# Patient Record
Sex: FEMALE | Race: WHITE | Employment: FULL TIME
[De-identification: names, ages, dates, MRNs, and addresses within clinical notes are randomized per-mention and may not be internally consistent; named-entity substitution may affect disease eponyms.]

---

## 2017-02-01 ENCOUNTER — CHARTING TRANS (OUTPATIENT)
Dept: OTHER | Age: 57
End: 2017-02-01

## 2017-02-01 ENCOUNTER — LAB SERVICES (OUTPATIENT)
Dept: OTHER | Age: 57
End: 2017-02-01

## 2017-02-01 LAB — RAPID STREP GROUP A: POSITIVE

## 2017-02-01 ASSESSMENT — PAIN SCALES - GENERAL: PAINLEVEL_OUTOF10: 5

## 2017-03-20 ENCOUNTER — TELEPHONE (OUTPATIENT)
Dept: FAMILY MEDICINE CLINIC | Facility: CLINIC | Age: 57
End: 2017-03-20

## 2017-03-20 DIAGNOSIS — Z12.31 ENCOUNTER FOR SCREENING MAMMOGRAM FOR BREAST CANCER: Primary | ICD-10-CM

## 2017-03-20 NOTE — TELEPHONE ENCOUNTER
Kunal Marc, please see order for mammogram. Please authorize if correct. Please review note below from [facility]. Thank you Celestina.

## 2017-03-20 NOTE — TELEPHONE ENCOUNTER
Pt would like order for a mammogram. Please advise. Thank you. Pt also wanted Juan Joséyn Postal to know she is seeing Dr. Alexandra Moser (cardiology) tomorrow 3/21/17.

## 2017-03-21 ENCOUNTER — PRIOR ORIGINAL RECORDS (OUTPATIENT)
Dept: OTHER | Age: 57
End: 2017-03-21

## 2017-03-21 ENCOUNTER — HOSPITAL ENCOUNTER (OUTPATIENT)
Dept: MAMMOGRAPHY | Age: 57
Discharge: HOME | End: 2017-03-21
Attending: FAMILY MEDICINE
Payer: COMMERCIAL

## 2017-03-21 DIAGNOSIS — Z12.31 ENCOUNTER FOR SCREENING MAMMOGRAM FOR BREAST CANCER: ICD-10-CM

## 2017-03-21 PROCEDURE — 77067 SCR MAMMO BI INCL CAD: CPT

## 2017-03-22 ENCOUNTER — HOSPITAL ENCOUNTER (OUTPATIENT)
Dept: CV DIAGNOSTICS | Facility: HOSPITAL | Age: 57
Discharge: HOME | End: 2017-03-22
Attending: INTERNAL MEDICINE

## 2017-03-22 ENCOUNTER — MYAURORA ACCOUNT LINK (OUTPATIENT)
Dept: OTHER | Age: 57
End: 2017-03-22

## 2017-03-22 DIAGNOSIS — I10 ESSENTIAL HYPERTENSION, BENIGN: ICD-10-CM

## 2017-03-22 DIAGNOSIS — R06.00 DYSPNEA, UNSPECIFIED TYPE: ICD-10-CM

## 2017-03-24 ENCOUNTER — PRIOR ORIGINAL RECORDS (OUTPATIENT)
Dept: OTHER | Age: 57
End: 2017-03-24

## 2017-03-27 PROBLEM — Z86.010 HISTORY OF ADENOMATOUS POLYP OF COLON: Status: ACTIVE | Noted: 2017-03-27

## 2017-03-27 PROBLEM — Z86.0101 HISTORY OF ADENOMATOUS POLYP OF COLON: Status: ACTIVE | Noted: 2017-03-27

## 2017-03-30 ENCOUNTER — ANESTHESIA EVENT (OUTPATIENT)
Dept: ENDOSCOPY | Facility: HOSPITAL | Age: 57
End: 2017-03-30

## 2017-03-30 ENCOUNTER — SURGERY (OUTPATIENT)
Age: 57
End: 2017-03-30

## 2017-03-30 ENCOUNTER — HOSPITAL ENCOUNTER (OUTPATIENT)
Facility: HOSPITAL | Age: 57
Setting detail: HOSPITAL OUTPATIENT SURGERY
Discharge: HOME | End: 2017-03-30
Attending: INTERNAL MEDICINE | Admitting: INTERNAL MEDICINE
Payer: COMMERCIAL

## 2017-03-30 ENCOUNTER — ANESTHESIA (OUTPATIENT)
Dept: ENDOSCOPY | Facility: HOSPITAL | Age: 57
End: 2017-03-30

## 2017-03-30 VITALS
WEIGHT: 164 LBS | SYSTOLIC BLOOD PRESSURE: 106 MMHG | DIASTOLIC BLOOD PRESSURE: 57 MMHG | BODY MASS INDEX: 30.18 KG/M2 | HEIGHT: 62 IN | HEART RATE: 65 BPM | OXYGEN SATURATION: 97 % | RESPIRATION RATE: 21 BRPM

## 2017-03-30 DIAGNOSIS — K57.90 DIVERTICULOSIS: Primary | ICD-10-CM

## 2017-03-30 PROCEDURE — 36415 COLL VENOUS BLD VENIPUNCTURE: CPT | Performed by: INTERNAL MEDICINE

## 2017-03-30 PROCEDURE — 0DJD8ZZ INSPECTION OF LOWER INTESTINAL TRACT, VIA NATURAL OR ARTIFICIAL OPENING ENDOSCOPIC: ICD-10-PCS | Performed by: INTERNAL MEDICINE

## 2017-03-30 RX ORDER — MIDAZOLAM HYDROCHLORIDE 1 MG/ML
INJECTION INTRAMUSCULAR; INTRAVENOUS AS NEEDED
Status: DISCONTINUED | OUTPATIENT
Start: 2017-03-30 | End: 2017-03-30 | Stop reason: SURG

## 2017-03-30 RX ORDER — SODIUM CHLORIDE, SODIUM LACTATE, POTASSIUM CHLORIDE, CALCIUM CHLORIDE 600; 310; 30; 20 MG/100ML; MG/100ML; MG/100ML; MG/100ML
INJECTION, SOLUTION INTRAVENOUS CONTINUOUS
Status: DISCONTINUED | OUTPATIENT
Start: 2017-03-30 | End: 2017-03-30

## 2017-03-30 RX ORDER — SODIUM CHLORIDE, SODIUM LACTATE, POTASSIUM CHLORIDE, CALCIUM CHLORIDE 600; 310; 30; 20 MG/100ML; MG/100ML; MG/100ML; MG/100ML
INJECTION, SOLUTION INTRAVENOUS CONTINUOUS PRN
Status: DISCONTINUED | OUTPATIENT
Start: 2017-03-30 | End: 2017-03-30 | Stop reason: SURG

## 2017-03-30 RX ORDER — NALOXONE HYDROCHLORIDE 0.4 MG/ML
80 INJECTION, SOLUTION INTRAMUSCULAR; INTRAVENOUS; SUBCUTANEOUS AS NEEDED
Status: DISCONTINUED | OUTPATIENT
Start: 2017-03-30 | End: 2017-03-30

## 2017-03-30 RX ORDER — LIDOCAINE HYDROCHLORIDE 10 MG/ML
INJECTION, SOLUTION EPIDURAL; INFILTRATION; INTRACAUDAL; PERINEURAL AS NEEDED
Status: DISCONTINUED | OUTPATIENT
Start: 2017-03-30 | End: 2017-03-30 | Stop reason: SURG

## 2017-03-30 RX ADMIN — SODIUM CHLORIDE, SODIUM LACTATE, POTASSIUM CHLORIDE, CALCIUM CHLORIDE: 600; 310; 30; 20 INJECTION, SOLUTION INTRAVENOUS at 14:50:00

## 2017-03-30 RX ADMIN — MIDAZOLAM HYDROCHLORIDE 2 MG: 1 INJECTION INTRAMUSCULAR; INTRAVENOUS at 14:38:00

## 2017-03-30 RX ADMIN — SODIUM CHLORIDE, SODIUM LACTATE, POTASSIUM CHLORIDE, CALCIUM CHLORIDE: 600; 310; 30; 20 INJECTION, SOLUTION INTRAVENOUS at 14:34:00

## 2017-03-30 RX ADMIN — LIDOCAINE HYDROCHLORIDE 50 MG: 10 INJECTION, SOLUTION EPIDURAL; INFILTRATION; INTRACAUDAL; PERINEURAL at 14:37:00

## 2017-03-30 NOTE — ANESTHESIA POSTPROCEDURE EVALUATION
Patient: Tiffany Zimmerman    Procedure Summary     Date Anesthesia Start Anesthesia Stop Room / Location    03/30/17 1434 1456 [address] [facility] ENDOSCOPY 05 / [address] [facility] ENDOSCOPY       Procedure Diagnosis Surgeon Responsible Provider    COLONOSCOPY (N/A ) Hx of colonic polyps

## 2017-03-30 NOTE — ANESTHESIA PREPROCEDURE EVALUATION
Anesthesia PreOp Note    HPI:     Mendoza Guardado is a 61year old female who presents for preoperative consultation requested by: Divine Crowell MD    Date of Surgery: 3/30/2017    Procedure(s):  COLONOSCOPY  Indication: Hx of colonic polyps    Pre-Op 3350-KCl-NaBcb-NaCl-NaSulf 236 g Oral Recon Soln Take as directed Disp: 4000 mL Rfl: 0    FLUoxetine HCl (PROZAC) 40 MG Oral Cap Take 1 capsule (40 mg total) by mouth daily.  Disp: 30 capsule Rfl: 0 3/30/2017 at 0800   ezetimibe (ZETIA) 10 MG Oral Tab Take 29.99 kg/(m^2). height is 1.575 m (5' 2\") and weight is 74.39 kg (164 lb). Her blood pressure is 117/56 and her pulse is 78.  Her respiration is 35 and oxygen saturation is 96%.    03/28/17  1311 03/30/17  1316   BP:  117/56   Pulse:  78   Resp:  35   He

## 2017-03-30 NOTE — OPERATIVE REPORT
COLONOSCOPY REPORT    Patient Name:  Tyrone Roa Record #: [id number]  YOB: 1960  Date of Procedure: 3/30/2017    Referring physician: Mikie Burnette, DO     Colonoscopy to cecum    Surgeon:  Varghese Ornelas MD    Pre-op diagno

## 2017-03-30 NOTE — H&P
RE-PROCEDURE UPDATE    HPI: Carol Alatorre is a 61year old female. 1/2/1960. Patient presents for a colonoscopy. ALLERGIES:   Clindamycin             Diarrhea    Comment:Rectal bleeding      No current outpatient prescriptions on file.   Past Medical

## 2017-03-31 ENCOUNTER — OCC HEALTH (OUTPATIENT)
Dept: OCCUPATIONAL MEDICINE | Age: 57
End: 2017-03-31
Attending: PHYSICIAN ASSISTANT

## 2017-03-31 ENCOUNTER — PRIOR ORIGINAL RECORDS (OUTPATIENT)
Dept: OTHER | Age: 57
End: 2017-03-31

## 2017-06-13 ENCOUNTER — PRIOR ORIGINAL RECORDS (OUTPATIENT)
Dept: OTHER | Age: 57
End: 2017-06-13

## 2017-06-27 DIAGNOSIS — E78.00 HYPERCHOLESTEREMIA: ICD-10-CM

## 2017-06-30 RX ORDER — EZETIMIBE 10 MG/1
10 TABLET ORAL
Qty: 90 TABLET | Refills: 0 | Status: SHIPPED
Start: 2017-06-30 | End: 2018-02-13 | Stop reason: ALTCHOICE

## 2017-06-30 RX ORDER — HYDROCHLOROTHIAZIDE 12.5 MG/1
12.5 CAPSULE, GELATIN COATED ORAL DAILY
Qty: 90 CAPSULE | Refills: 0 | Status: SHIPPED
Start: 2017-06-30 | End: 2017-09-27

## 2017-06-30 RX ORDER — ROSUVASTATIN CALCIUM 40 MG/1
40 TABLET, COATED ORAL
Qty: 90 TABLET | Refills: 0 | Status: SHIPPED
Start: 2017-06-30 | End: 2017-09-27

## 2017-06-30 RX ORDER — PROPRANOLOL HYDROCHLORIDE 10 MG/1
TABLET ORAL
Qty: 90 TABLET | Refills: 0 | Status: SHIPPED
Start: 2017-06-30 | End: 2017-09-27

## 2017-06-30 RX ORDER — RAMIPRIL 10 MG/1
10 CAPSULE ORAL DAILY
Qty: 90 CAPSULE | Refills: 0 | Status: SHIPPED
Start: 2017-06-30 | End: 2017-09-27

## 2017-06-30 RX ORDER — FLUOXETINE HYDROCHLORIDE 40 MG/1
40 CAPSULE ORAL DAILY
Qty: 90 CAPSULE | Refills: 0 | Status: SHIPPED
Start: 2017-06-30 | End: 2017-07-13

## 2017-06-30 NOTE — TELEPHONE ENCOUNTER
Pt calling requesting update on refill request. She has been out of meds for two days now. Please advise. Thank you.

## 2017-07-05 ENCOUNTER — TELEPHONE (OUTPATIENT)
Dept: FAMILY MEDICINE CLINIC | Facility: CLINIC | Age: 57
End: 2017-07-05

## 2017-07-13 ENCOUNTER — OFFICE VISIT (OUTPATIENT)
Dept: FAMILY MEDICINE CLINIC | Facility: CLINIC | Age: 57
End: 2017-07-13

## 2017-07-13 VITALS
DIASTOLIC BLOOD PRESSURE: 88 MMHG | BODY MASS INDEX: 32.94 KG/M2 | TEMPERATURE: 97 F | HEART RATE: 80 BPM | SYSTOLIC BLOOD PRESSURE: 138 MMHG | RESPIRATION RATE: 12 BRPM | WEIGHT: 179 LBS | HEIGHT: 62 IN

## 2017-07-13 DIAGNOSIS — F41.9 ANXIETY: ICD-10-CM

## 2017-07-13 DIAGNOSIS — I10 ESSENTIAL HYPERTENSION: Primary | ICD-10-CM

## 2017-07-13 DIAGNOSIS — E78.00 HYPERCHOLESTEREMIA: ICD-10-CM

## 2017-07-13 PROCEDURE — 99213 OFFICE O/P EST LOW 20 MIN: CPT | Performed by: FAMILY MEDICINE

## 2017-07-13 RX ORDER — FLUOXETINE HYDROCHLORIDE 20 MG/1
20 CAPSULE ORAL DAILY
Qty: 30 CAPSULE | Refills: 0 | COMMUNITY
Start: 2017-07-13 | End: 2018-02-12

## 2017-07-13 NOTE — PROGRESS NOTES
CHIEF COMPLAINT:   Lacy Sessions a 61year old female is here for followup on HTN and anxiety  HPI:   Lacy Sessions has been doing well since the last visit here. Lacy Sessions  is compliant with hypertensive medication. No chest pain. No dyspnea.  No update. If doing well on lower dose, will decrease further to 10mg daily x 1month - if continues to do well then would stop and give alprazolam or similar for just rare prn anxiety.

## 2017-08-21 ENCOUNTER — TELEPHONE (OUTPATIENT)
Dept: SURGERY | Facility: CLINIC | Age: 57
End: 2017-08-21

## 2017-09-23 ENCOUNTER — PRIOR ORIGINAL RECORDS (OUTPATIENT)
Dept: OTHER | Age: 57
End: 2017-09-23

## 2017-09-24 LAB
ALBUMIN/GLOBULIN RATIO: 1.3 (CALC) (ref 1–2.5)
ALBUMIN: 4.3 G/DL (ref 3.6–5.1)
ALKALINE PHOSPHATASE: 73 U/L (ref 33–130)
ALT: 15 U/L (ref 6–29)
AST: 21 U/L (ref 10–35)
BILIRUBIN, TOTAL: 0.3 MG/DL (ref 0.2–1.2)
BUN: 18 MG/DL (ref 7–25)
CALCIUM: 9.3 MG/DL (ref 8.6–10.4)
CARBON DIOXIDE: 24 MMOL/L (ref 20–31)
CHLORIDE: 105 MMOL/L (ref 98–110)
CHOL/HDLC RATIO: 9.1 (CALC)
CHOLESTEROL, TOTAL: 338 MG/DL
CREATININE: 0.76 MG/DL (ref 0.5–1.05)
EGFR IF AFRICN AM: 100 ML/MIN/1.73M2
EGFR IF NONAFRICN AM: 86 ML/MIN/1.73M2
GLOBULIN: 3.2 G/DL (CALC) (ref 1.9–3.7)
GLUCOSE: 97 MG/DL (ref 65–99)
HDL CHOLESTEROL: 37 MG/DL
LDL-CHOLESTEROL: 257 MG/DL (CALC)
NON-HDL CHOLESTEROL: 301 MG/DL (CALC)
POTASSIUM: 4.1 MMOL/L (ref 3.5–5.3)
PROTEIN, TOTAL: 7.5 G/DL (ref 6.1–8.1)
SODIUM: 140 MMOL/L (ref 135–146)
TRIGLYCERIDES: 236 MG/DL

## 2017-09-27 DIAGNOSIS — E78.00 HYPERCHOLESTEREMIA: ICD-10-CM

## 2017-09-28 RX ORDER — RAMIPRIL 10 MG/1
CAPSULE ORAL
Qty: 90 CAPSULE | Refills: 0 | Status: SHIPPED | OUTPATIENT
Start: 2017-09-28 | End: 2018-01-08

## 2017-09-28 RX ORDER — ROSUVASTATIN CALCIUM 40 MG/1
TABLET, COATED ORAL
Qty: 90 TABLET | Refills: 0 | Status: SHIPPED | OUTPATIENT
Start: 2017-09-28 | End: 2018-01-08

## 2017-09-28 RX ORDER — HYDROCHLOROTHIAZIDE 12.5 MG/1
CAPSULE, GELATIN COATED ORAL
Qty: 90 CAPSULE | Refills: 0 | Status: SHIPPED | OUTPATIENT
Start: 2017-09-28 | End: 2018-01-08

## 2017-09-28 RX ORDER — FLUOXETINE 10 MG/1
CAPSULE ORAL
Qty: 15 CAPSULE | Refills: 0 | Status: SHIPPED | OUTPATIENT
Start: 2017-09-28 | End: 2018-02-12

## 2017-09-28 RX ORDER — PROPRANOLOL HYDROCHLORIDE 10 MG/1
TABLET ORAL
Qty: 90 TABLET | Refills: 0 | Status: ON HOLD | OUTPATIENT
Start: 2017-09-28 | End: 2018-03-26

## 2017-09-28 NOTE — TELEPHONE ENCOUNTER
Spoke with pt and she stated that she just called her pharmacy to refill all her meds and forgot that this is changed. Per pt, she has been taking 20 mg every other day and doing well.       Spoke with Mariaelena Baker and she advised to start 10 mg by m

## 2017-09-29 LAB
ALBUMIN: 4.3 G/DL
ALKALINE PHOSPHATATE(ALK PHOS): 73 IU/L
BILIRUBIN TOTAL: 0.3 MG/DL
BUN: 18 MG/DL
CALCIUM: 9.3 MG/DL
CHLORIDE: 105 MEQ/L
CHOLESTEROL, TOTAL: 338 MG/DL
CREATININE, SERUM: 0.76 MG/DL
GLOBULIN: 3.2 G/DL
GLUCOSE: 97 MG/DL
HDL CHOLESTEROL: 37 MG/DL
LDL CHOLESTEROL: 257 MG/DL
POTASSIUM, SERUM: 4.1 MEQ/L
PROTEIN, TOTAL: 7.5 G/DL
SGOT (AST): 21 IU/L
SGPT (ALT): 15 IU/L
SODIUM: 140 MEQ/L
TRIGLYCERIDES: 236 MG/DL

## 2017-10-17 ENCOUNTER — PRIOR ORIGINAL RECORDS (OUTPATIENT)
Dept: OTHER | Age: 57
End: 2017-10-17

## 2017-11-02 ENCOUNTER — MED REC SCAN ONLY (OUTPATIENT)
Dept: FAMILY MEDICINE CLINIC | Facility: CLINIC | Age: 57
End: 2017-11-02

## 2017-11-13 ENCOUNTER — TELEPHONE (OUTPATIENT)
Dept: FAMILY MEDICINE CLINIC | Facility: CLINIC | Age: 57
End: 2017-11-13

## 2017-11-13 NOTE — TELEPHONE ENCOUNTER
Pt is out of FLUoxetine HCl 10 MG Oral Cap and would like to know if Airam Mota advises stopping the medication. Pt has been decreasing her dose of this medication.

## 2017-11-14 NOTE — TELEPHONE ENCOUNTER
Call to pt-confirms she has not had any symptoms return while weaning off fluoxetine. Advised of blanca MUKHERJEE comments/recommendeations. Advised to continue to monitor and if any symptoms return, notify blanca.   Patient voices understanding/agrees with

## 2017-11-14 NOTE — TELEPHONE ENCOUNTER
If she has had no return of symptoms as she has decreased the dose of the medication, she could now discontinue.

## 2017-11-22 ENCOUNTER — PRIOR ORIGINAL RECORDS (OUTPATIENT)
Dept: OTHER | Age: 57
End: 2017-11-22

## 2017-12-12 ENCOUNTER — PRIOR ORIGINAL RECORDS (OUTPATIENT)
Dept: OTHER | Age: 57
End: 2017-12-12

## 2018-01-02 ENCOUNTER — PRIOR ORIGINAL RECORDS (OUTPATIENT)
Dept: OTHER | Age: 58
End: 2018-01-02

## 2018-01-05 ENCOUNTER — HOSPITAL ENCOUNTER (OUTPATIENT)
Dept: CV DIAGNOSTICS | Facility: HOSPITAL | Age: 58
Discharge: HOME | End: 2018-01-05
Attending: INTERNAL MEDICINE
Payer: COMMERCIAL

## 2018-01-05 DIAGNOSIS — R06.00 DYSPNEA, UNSPECIFIED TYPE: ICD-10-CM

## 2018-01-05 PROCEDURE — 93350 STRESS TTE ONLY: CPT | Performed by: INTERNAL MEDICINE

## 2018-01-05 PROCEDURE — 93018 CV STRESS TEST I&R ONLY: CPT | Performed by: INTERNAL MEDICINE

## 2018-01-05 PROCEDURE — 93017 CV STRESS TEST TRACING ONLY: CPT | Performed by: INTERNAL MEDICINE

## 2018-01-08 ENCOUNTER — TELEPHONE (OUTPATIENT)
Dept: FAMILY MEDICINE CLINIC | Facility: CLINIC | Age: 58
End: 2018-01-08

## 2018-01-08 ENCOUNTER — PRIOR ORIGINAL RECORDS (OUTPATIENT)
Dept: OTHER | Age: 58
End: 2018-01-08

## 2018-01-08 DIAGNOSIS — Z12.39 SCREENING FOR BREAST CANCER: ICD-10-CM

## 2018-01-08 DIAGNOSIS — N60.19 FIBROCYSTIC BREAST DISEASE (FCBD), UNSPECIFIED LATERALITY: Primary | ICD-10-CM

## 2018-01-08 DIAGNOSIS — E78.00 HYPERCHOLESTEREMIA: ICD-10-CM

## 2018-01-08 NOTE — TELEPHONE ENCOUNTER
Pt scheduled a physical w/Alana 2/2/18. Pt requesting a mammogram order be placed through [address], as pt has a hx of calcifications and surgery has been recommended in the past as well as keeping an eye on these per pt.     Please contact pt when mammogr

## 2018-01-08 NOTE — TELEPHONE ENCOUNTER
Please call pt for follow up HTN with Zoraida Pena for next month. Then back to Nurses for refills. Thanks.

## 2018-01-09 ENCOUNTER — TELEPHONE (OUTPATIENT)
Dept: FAMILY MEDICINE CLINIC | Facility: CLINIC | Age: 58
End: 2018-01-09

## 2018-01-09 DIAGNOSIS — R93.89 ABNORMAL X-RAY OF NECK: Primary | ICD-10-CM

## 2018-01-09 NOTE — TELEPHONE ENCOUNTER
Call to pt-advised of blanca MUKHERJEE comments/recommendations noted below. Pt sts was advised to have follow up mamm every 6 months \"due to hx of breast calcifications. \" reviewed 3/21/17 mammogram findings/recommendations with pt.    Discussed recommendat

## 2018-01-09 NOTE — TELEPHONE ENCOUNTER
It appears that she has been doing a mammogram every year - I don't see any indication of recommendation for mammogram every 6 months and it appears that she does once yearly - at least since we have ordered (unless additional views were recommended by rad

## 2018-01-09 NOTE — TELEPHONE ENCOUNTER
information and recommendations given to patient, understanding verbalized.   States she was aware of the information, states she will have the dentist fax the information to our office, also unable to make scheduled appointment due to other engagements yogesh

## 2018-01-10 RX ORDER — HYDROCHLOROTHIAZIDE 12.5 MG/1
CAPSULE, GELATIN COATED ORAL
Qty: 90 CAPSULE | Refills: 0 | Status: ON HOLD | OUTPATIENT
Start: 2018-01-10 | End: 2018-03-26

## 2018-01-10 RX ORDER — ROSUVASTATIN CALCIUM 40 MG/1
TABLET, COATED ORAL
Qty: 90 TABLET | Refills: 0 | Status: ON HOLD | OUTPATIENT
Start: 2018-01-10 | End: 2018-03-26

## 2018-01-10 RX ORDER — RAMIPRIL 10 MG/1
CAPSULE ORAL
Qty: 90 CAPSULE | Refills: 0 | Status: ON HOLD | OUTPATIENT
Start: 2018-01-10 | End: 2018-03-26

## 2018-01-11 NOTE — TELEPHONE ENCOUNTER
Difficult to determine for certain with the orientation of the xray but there appears to be a calcification in one of the arteries in the right side of the neck. Check B/L carotid doppler.

## 2018-01-11 NOTE — TELEPHONE ENCOUNTER
June, please advise dx on this, I am not sure which artery you are referring to from the xray as I am not sure what this was xray of that pt brought you. Then I can pend u/s and call pt. Thanks.

## 2018-01-12 NOTE — TELEPHONE ENCOUNTER
I couldn't tell which artery since it's a plain xray from her dentist.  I just used abnormal xray as dx.   I have ordered test.

## 2018-01-12 NOTE — TELEPHONE ENCOUNTER
Call to pt-advised of info noted below from blanca MUKHERJEE. Reviewed order info, dx and advised can schedule thru [facility] central scheduling. Patient voices understanding/agrees with plan/no further questions.  sts has central sched #, also has an order from

## 2018-01-27 ENCOUNTER — HOSPITAL ENCOUNTER (OUTPATIENT)
Dept: ULTRASOUND IMAGING | Facility: HOSPITAL | Age: 58
Discharge: HOME | End: 2018-01-27
Attending: FAMILY MEDICINE
Payer: COMMERCIAL

## 2018-01-27 ENCOUNTER — PRIOR ORIGINAL RECORDS (OUTPATIENT)
Dept: OTHER | Age: 58
End: 2018-01-27

## 2018-01-27 ENCOUNTER — HOSPITAL ENCOUNTER (OUTPATIENT)
Dept: CT IMAGING | Facility: HOSPITAL | Age: 58
Discharge: HOME | End: 2018-01-27
Attending: INTERNAL MEDICINE

## 2018-01-27 DIAGNOSIS — Z13.6 SCREENING FOR HEART DISEASE: ICD-10-CM

## 2018-01-27 DIAGNOSIS — R93.89 ABNORMAL X-RAY OF NECK: ICD-10-CM

## 2018-01-27 PROCEDURE — 93880 EXTRACRANIAL BILAT STUDY: CPT | Performed by: FAMILY MEDICINE

## 2018-02-01 ENCOUNTER — TELEPHONE (OUTPATIENT)
Dept: FAMILY MEDICINE CLINIC | Facility: CLINIC | Age: 58
End: 2018-02-01

## 2018-02-01 DIAGNOSIS — R93.1 ABNORMAL HEART SCORE CT: Primary | ICD-10-CM

## 2018-02-05 ENCOUNTER — PRIOR ORIGINAL RECORDS (OUTPATIENT)
Dept: OTHER | Age: 58
End: 2018-02-05

## 2018-02-05 LAB — UFCT: 1044.75 CA SCORE

## 2018-02-06 ENCOUNTER — PRIOR ORIGINAL RECORDS (OUTPATIENT)
Dept: OTHER | Age: 58
End: 2018-02-06

## 2018-02-07 ENCOUNTER — PRIOR ORIGINAL RECORDS (OUTPATIENT)
Dept: OTHER | Age: 58
End: 2018-02-07

## 2018-02-12 RX ORDER — BIOTIN 5 MG
1 TABLET ORAL DAILY
COMMUNITY
End: 2018-06-26

## 2018-02-12 RX ORDER — ASPIRIN 81 MG/1
81 TABLET, CHEWABLE ORAL DAILY
COMMUNITY
End: 2018-03-12

## 2018-02-12 NOTE — HISTORICAL OFFICE NOTE
Cecytameka Lowery  : 1960 12:27:32  ACCOUNT:  [id number]  HOME PHONE:  [phone]  WORK PHONE:       Call placed to patient. Informed that Dr. Wallace Call reviewed carotid u/s and CT with calcium score.    Regarding carotid u/s: L 40% stenosis, R 25

## 2018-02-12 NOTE — HISTORICAL OFFICE NOTE
Charly Beckypaige  : 1960  ACCOUNT:  [id number]  [phone]  PCP: Dr. Carina Benson     TODAY'S DATE: 10/17/2017  DICTATED BY:  Rama Rodriguez MD]    CHIEF COMPLAINT: [Followup of Hypercholesterolemia, familial.]    HPI: [On 10/17/2017, Tabitha Srivastava and HR at [address]: No Known Allergies    MEDICATIONS: Selected prescriptions see below    VITAL SIGNS: [B/P - 156/90, Pulse - 66, Weight - 178, Height - 62, BMI - 32.6 ]    CONS: well developed, well nourished and overweight.  WEIGHT: [facility]

## 2018-02-13 ENCOUNTER — PRIOR ORIGINAL RECORDS (OUTPATIENT)
Dept: OTHER | Age: 58
End: 2018-02-13

## 2018-02-13 ENCOUNTER — LABORATORY ENCOUNTER (OUTPATIENT)
Dept: LAB | Age: 58
End: 2018-02-13
Attending: INTERNAL MEDICINE
Payer: COMMERCIAL

## 2018-02-13 ENCOUNTER — OFFICE VISIT (OUTPATIENT)
Dept: FAMILY MEDICINE CLINIC | Facility: CLINIC | Age: 58
End: 2018-02-13

## 2018-02-13 VITALS
HEART RATE: 72 BPM | BODY MASS INDEX: 32.02 KG/M2 | WEIGHT: 174 LBS | SYSTOLIC BLOOD PRESSURE: 132 MMHG | DIASTOLIC BLOOD PRESSURE: 76 MMHG | RESPIRATION RATE: 12 BRPM | HEIGHT: 62 IN | TEMPERATURE: 98 F

## 2018-02-13 DIAGNOSIS — E78.00 HYPERCHOLESTEREMIA: ICD-10-CM

## 2018-02-13 DIAGNOSIS — R93.89 ABNORMAL X-RAY OF NECK: ICD-10-CM

## 2018-02-13 DIAGNOSIS — Z00.00 ROUTINE GENERAL MEDICAL EXAMINATION AT A HEALTH CARE FACILITY: Primary | ICD-10-CM

## 2018-02-13 DIAGNOSIS — Z00.00 BLOOD TESTS FOR ROUTINE GENERAL PHYSICAL EXAMINATION: ICD-10-CM

## 2018-02-13 DIAGNOSIS — I25.10 CAD (CORONARY ARTERY DISEASE): ICD-10-CM

## 2018-02-13 LAB
BASOPHILS # BLD AUTO: 0.11 X10(3) UL (ref 0–0.1)
BASOPHILS NFR BLD AUTO: 1.7 %
BUN BLD-MCNC: 18 MG/DL (ref 8–20)
CALCIUM BLD-MCNC: 9.5 MG/DL (ref 8.3–10.3)
CHLORIDE: 101 MMOL/L (ref 101–111)
CO2: 28 MMOL/L (ref 22–32)
CREAT BLD-MCNC: 0.9 MG/DL (ref 0.55–1.02)
EOSINOPHIL # BLD AUTO: 0.24 X10(3) UL (ref 0–0.3)
EOSINOPHIL NFR BLD AUTO: 3.8 %
ERYTHROCYTE [DISTWIDTH] IN BLOOD BY AUTOMATED COUNT: 13.4 % (ref 11.5–16)
FREE T4: 0.9 NG/DL (ref 0.9–1.8)
GLUCOSE BLD-MCNC: 101 MG/DL (ref 70–99)
HCT VFR BLD AUTO: 41.9 % (ref 34–50)
HGB BLD-MCNC: 13.6 G/DL (ref 12–16)
IMMATURE GRANULOCYTE COUNT: 0.01 X10(3) UL (ref 0–1)
IMMATURE GRANULOCYTE RATIO %: 0.2 %
LYMPHOCYTES # BLD AUTO: 2.06 X10(3) UL (ref 0.9–4)
LYMPHOCYTES NFR BLD AUTO: 32.4 %
MCH RBC QN AUTO: 30.8 PG (ref 27–33.2)
MCHC RBC AUTO-ENTMCNC: 32.5 G/DL (ref 31–37)
MCV RBC AUTO: 95 FL (ref 81–100)
MONOCYTES # BLD AUTO: 0.59 X10(3) UL (ref 0.1–1)
MONOCYTES NFR BLD AUTO: 9.3 %
NEUTROPHIL ABS PRELIM: 3.34 X10 (3) UL (ref 1.3–6.7)
NEUTROPHILS # BLD AUTO: 3.34 X10(3) UL (ref 1.3–6.7)
NEUTROPHILS NFR BLD AUTO: 52.6 %
PLATELET # BLD AUTO: 329 10(3)UL (ref 150–450)
POTASSIUM SERPL-SCNC: 3.9 MMOL/L (ref 3.6–5.1)
RBC # BLD AUTO: 4.41 X10(6)UL (ref 3.8–5.1)
RED CELL DISTRIBUTION WIDTH-SD: 47.1 FL (ref 35.1–46.3)
SODIUM SERPL-SCNC: 138 MMOL/L (ref 136–144)
TSI SER-ACNC: 7.05 MIU/ML (ref 0.35–5.5)
WBC # BLD AUTO: 6.4 X10(3) UL (ref 4–13)

## 2018-02-13 PROCEDURE — 36415 COLL VENOUS BLD VENIPUNCTURE: CPT

## 2018-02-13 PROCEDURE — 84443 ASSAY THYROID STIM HORMONE: CPT | Performed by: FAMILY MEDICINE

## 2018-02-13 PROCEDURE — 84439 ASSAY OF FREE THYROXINE: CPT | Performed by: FAMILY MEDICINE

## 2018-02-13 PROCEDURE — 85025 COMPLETE CBC W/AUTO DIFF WBC: CPT

## 2018-02-13 PROCEDURE — 80048 BASIC METABOLIC PNL TOTAL CA: CPT

## 2018-02-13 PROCEDURE — 99396 PREV VISIT EST AGE 40-64: CPT | Performed by: FAMILY MEDICINE

## 2018-02-13 NOTE — PROGRESS NOTES
CHIEF COMPLAINT   Complete physical  HPI:   Corby Ortiz is a 60year old female who presents for a complete physical exam.   Dr. Nicolas Rawls for pap/breast/pelvic. Angiogram pending next week.   Working with Dr. Jaya Oswald to try to get zetia or Arti Irwin MOUTH DAILY Disp: 90 capsule Rfl: 0   RAMIPRIL 10 MG Oral Cap TAKE 1 CAPSULE(10 MG) BY MOUTH DAILY Disp: 90 capsule Rfl: 0   PROPRANOLOL HCL 10 MG Oral Tab TAKE 1 TABLET BY MOUTH DAILY AS NEEDED Disp: 90 tablet Rfl: 0        Clindamycin             Diarrhe Used                      Alcohol use:  Yes              Comment: 3 drinks a week        REVIEW OF SYSTEMS:   GENERAL: feels well otherwise  SKIN: denies any unusual skin lesions  EYES:denies blurred vision or double vision  HEENT: denies nasal congestion, xray calcifications noted in neck to determine if further testing needed. Follow up with cardiology for angiogram as planned.

## 2018-02-14 ENCOUNTER — APPOINTMENT (OUTPATIENT)
Dept: LAB | Age: 58
End: 2018-02-14
Attending: INTERNAL MEDICINE
Payer: COMMERCIAL

## 2018-02-14 DIAGNOSIS — I25.10 CAD (CORONARY ARTERY DISEASE): ICD-10-CM

## 2018-02-14 LAB
ATRIAL RATE: 81 BPM
BUN: 18 MG/DL
CALCIUM: 9.5 MG/DL
CHLORIDE: 101 MEQ/L
CREATININE, SERUM: 0.9 MG/DL
GLUCOSE: 101 MG/DL
HEMATOCRIT: 41.9 %
HEMOGLOBIN: 13.6 G/DL
P AXIS: -18 DEGREES
P-R INTERVAL: 138 MS
PLATELETS: 329 K/UL
POTASSIUM, SERUM: 3.9 MEQ/L
Q-T INTERVAL: 402 MS
QRS DURATION: 74 MS
QTC CALCULATION (BEZET): 466 MS
R AXIS: -4 DEGREES
RED BLOOD COUNT: 4.41 X 10-6/U
SODIUM: 138 MEQ/L
T AXIS: 10 DEGREES
VENTRICULAR RATE: 81 BPM
WHITE BLOOD COUNT: 6.4 X 10-3/U

## 2018-02-14 PROCEDURE — 93010 ELECTROCARDIOGRAM REPORT: CPT | Performed by: INTERNAL MEDICINE

## 2018-02-14 PROCEDURE — 93005 ELECTROCARDIOGRAM TRACING: CPT

## 2018-02-15 ENCOUNTER — HOSPITAL ENCOUNTER (OUTPATIENT)
Dept: INTERVENTIONAL RADIOLOGY/VASCULAR | Facility: HOSPITAL | Age: 58
Discharge: HOME | End: 2018-02-15
Attending: INTERNAL MEDICINE | Admitting: INTERNAL MEDICINE
Payer: COMMERCIAL

## 2018-02-15 VITALS
OXYGEN SATURATION: 100 % | HEIGHT: 62 IN | RESPIRATION RATE: 21 BRPM | TEMPERATURE: 98 F | DIASTOLIC BLOOD PRESSURE: 74 MMHG | HEART RATE: 98 BPM | WEIGHT: 173 LBS | BODY MASS INDEX: 31.83 KG/M2 | SYSTOLIC BLOOD PRESSURE: 145 MMHG

## 2018-02-15 DIAGNOSIS — R94.31 ABNORMAL EKG: ICD-10-CM

## 2018-02-15 DIAGNOSIS — I25.10 CAD (CORONARY ARTERY DISEASE): ICD-10-CM

## 2018-02-15 DIAGNOSIS — R07.9 CHEST PAIN: ICD-10-CM

## 2018-02-15 PROCEDURE — 99153 MOD SED SAME PHYS/QHP EA: CPT

## 2018-02-15 PROCEDURE — 93458 L HRT ARTERY/VENTRICLE ANGIO: CPT

## 2018-02-15 PROCEDURE — 99152 MOD SED SAME PHYS/QHP 5/>YRS: CPT

## 2018-02-15 PROCEDURE — B2151ZZ FLUOROSCOPY OF LEFT HEART USING LOW OSMOLAR CONTRAST: ICD-10-PCS | Performed by: INTERNAL MEDICINE

## 2018-02-15 PROCEDURE — 4A023N7 MEASUREMENT OF CARDIAC SAMPLING AND PRESSURE, LEFT HEART, PERCUTANEOUS APPROACH: ICD-10-PCS | Performed by: INTERNAL MEDICINE

## 2018-02-15 PROCEDURE — B2111ZZ FLUOROSCOPY OF MULTIPLE CORONARY ARTERIES USING LOW OSMOLAR CONTRAST: ICD-10-PCS | Performed by: INTERNAL MEDICINE

## 2018-02-15 RX ORDER — LIDOCAINE HYDROCHLORIDE 10 MG/ML
INJECTION, SOLUTION INFILTRATION; PERINEURAL
Status: COMPLETED
Start: 2018-02-15 | End: 2018-02-15

## 2018-02-15 RX ORDER — ASPIRIN 81 MG/1
324 TABLET, CHEWABLE ORAL ONCE
Status: DISCONTINUED | OUTPATIENT
Start: 2018-02-15 | End: 2018-02-15

## 2018-02-15 RX ORDER — MIDAZOLAM HYDROCHLORIDE 1 MG/ML
INJECTION INTRAMUSCULAR; INTRAVENOUS
Status: COMPLETED
Start: 2018-02-15 | End: 2018-02-15

## 2018-02-15 RX ORDER — HEPARIN SODIUM 5000 [USP'U]/ML
INJECTION, SOLUTION INTRAVENOUS; SUBCUTANEOUS
Status: COMPLETED
Start: 2018-02-15 | End: 2018-02-15

## 2018-02-15 RX ORDER — SODIUM CHLORIDE 9 MG/ML
INJECTION, SOLUTION INTRAVENOUS CONTINUOUS
Status: DISCONTINUED | OUTPATIENT
Start: 2018-02-15 | End: 2018-02-15

## 2018-02-15 RX ADMIN — SODIUM CHLORIDE: 9 INJECTION, SOLUTION INTRAVENOUS at 09:15:00

## 2018-02-15 NOTE — H&P
History & Physical Examination    Patient Name: Triny Lopez  MRN: [id number]  CSN: [id number]  YOB: 1960    Diagnosis: abnl UFCT    Present Illness: Pt with no complaints of CP/SOB.   She has familial hypercholesterolemia and a markedly posit polyps. Tics.  Repeat in 3                years  3/30/2017: COLONOSCOPY N/A      Comment: Procedure: COLONOSCOPY;  Surgeon: Shazia Wall MD;  Location: [address] [place] [address] ENDOSCOPY  11/19/2013: COLONOSCOPY WITH BIOPSY  03/2016: SHAMIKA BIOPSY STEREOTACTIC

## 2018-02-15 NOTE — PROGRESS NOTES
Rc'd pt from cath lab in stable condition. VSS. Manual dressing to right groin is soft, clean and dry. No bleeding or hematoma. Pt denies c/o pain or discomfort. Pt to remain on bedrest for 4hrs. 14:00: Dr Julius Lugo at bedside. No other changes.      1

## 2018-02-16 ENCOUNTER — PRIOR ORIGINAL RECORDS (OUTPATIENT)
Dept: OTHER | Age: 58
End: 2018-02-16

## 2018-02-16 ENCOUNTER — MYAURORA ACCOUNT LINK (OUTPATIENT)
Dept: OTHER | Age: 58
End: 2018-02-16

## 2018-02-16 NOTE — PROCEDURES
[facility]    PATIENT'S NAME: Ethan Cm   ATTENDING PHYSICIAN: Lore Foote. Erik Heck M.D. OPERATING PHYSICIAN: Judson Greco M.D.    PATIENT ACCOUNT#:   [de-identified]    LOCATION:  [facility] 11 EDW 10  MEDICAL RECORD #:   [id number]       DATE OF ALYSSA Left main bifurcates into the left anterior descending and circumflex coronary arteries. The left anterior descending artery is heavily calcified proximally. There is narrowing in the 20% to 30% range proximally. There is a diagonal takeoff noted.   This aortic valve. SUMMARY:    1. Diffuse calcified coronary artery tree as noted above. 2.   Diffuse coronary disease with 2 areas of high-grade stenosis. 3.   Intact left ventricular systolic function.       RECOMMENDATIONS:  Patient will need coronary

## 2018-02-20 ENCOUNTER — PRIOR ORIGINAL RECORDS (OUTPATIENT)
Dept: OTHER | Age: 58
End: 2018-02-20

## 2018-02-22 ENCOUNTER — PRIOR ORIGINAL RECORDS (OUTPATIENT)
Dept: OTHER | Age: 58
End: 2018-02-22

## 2018-02-23 ENCOUNTER — PRIOR ORIGINAL RECORDS (OUTPATIENT)
Dept: OTHER | Age: 58
End: 2018-02-23

## 2018-02-27 ENCOUNTER — PRIOR ORIGINAL RECORDS (OUTPATIENT)
Dept: OTHER | Age: 58
End: 2018-02-27

## 2018-03-06 ENCOUNTER — PRIOR ORIGINAL RECORDS (OUTPATIENT)
Dept: OTHER | Age: 58
End: 2018-03-06

## 2018-03-12 ENCOUNTER — HOSPITAL ENCOUNTER (OUTPATIENT)
Dept: GENERAL RADIOLOGY | Facility: HOSPITAL | Age: 58
Discharge: HOME | End: 2018-03-12
Attending: THORACIC SURGERY (CARDIOTHORACIC VASCULAR SURGERY)
Payer: COMMERCIAL

## 2018-03-12 ENCOUNTER — HOSPITAL ENCOUNTER (OUTPATIENT)
Dept: INTERVENTIONAL RADIOLOGY/VASCULAR | Facility: HOSPITAL | Age: 58
Discharge: HOME | End: 2018-03-12
Attending: THORACIC SURGERY (CARDIOTHORACIC VASCULAR SURGERY)
Payer: COMMERCIAL

## 2018-03-12 ENCOUNTER — HOSPITAL ENCOUNTER (OUTPATIENT)
Dept: LAB | Facility: HOSPITAL | Age: 58
Discharge: HOME | End: 2018-03-12
Attending: THORACIC SURGERY (CARDIOTHORACIC VASCULAR SURGERY)
Payer: COMMERCIAL

## 2018-03-12 DIAGNOSIS — Z01.818 PREOP TESTING: ICD-10-CM

## 2018-03-12 DIAGNOSIS — E78.49 FAMILIAL HYPERLIPIDEMIA: ICD-10-CM

## 2018-03-12 DIAGNOSIS — I10 HTN (HYPERTENSION): ICD-10-CM

## 2018-03-12 LAB
ALBUMIN SERPL-MCNC: 4 G/DL (ref 3.5–4.8)
ALP LIVER SERPL-CCNC: 81 U/L (ref 46–118)
ALT SERPL-CCNC: 32 U/L (ref 14–54)
ANTIBODY SCREEN: NEGATIVE
APTT PPP: 42.8 SECONDS (ref 25–34)
AST SERPL-CCNC: 29 U/L (ref 15–41)
BASOPHILS # BLD AUTO: 0.09 X10(3) UL (ref 0–0.1)
BASOPHILS NFR BLD AUTO: 1.3 %
BILIRUB SERPL-MCNC: 0.5 MG/DL (ref 0.1–2)
BILIRUB UR QL STRIP.AUTO: NEGATIVE
BUN BLD-MCNC: 12 MG/DL (ref 8–20)
CALCIUM BLD-MCNC: 9.2 MG/DL (ref 8.3–10.3)
CHLORIDE: 104 MMOL/L (ref 101–111)
CO2: 27 MMOL/L (ref 22–32)
COLOR UR AUTO: YELLOW
CREAT BLD-MCNC: 0.79 MG/DL (ref 0.55–1.02)
EOSINOPHIL # BLD AUTO: 0.24 X10(3) UL (ref 0–0.3)
EOSINOPHIL NFR BLD AUTO: 3.5 %
ERYTHROCYTE [DISTWIDTH] IN BLOOD BY AUTOMATED COUNT: 13.2 % (ref 11.5–16)
EST. AVERAGE GLUCOSE BLD GHB EST-MCNC: 126 MG/DL (ref 68–126)
GLUCOSE BLD-MCNC: 99 MG/DL (ref 70–99)
GLUCOSE UR STRIP.AUTO-MCNC: NEGATIVE MG/DL
HBA1C MFR BLD HPLC: 6 % (ref ?–5.7)
HCT VFR BLD AUTO: 41.4 % (ref 34–50)
HGB BLD-MCNC: 13.5 G/DL (ref 12–16)
IMMATURE GRANULOCYTE COUNT: 0.02 X10(3) UL (ref 0–1)
IMMATURE GRANULOCYTE RATIO %: 0.3 %
INR BLD: 0.99 (ref 0.89–1.11)
KETONES UR STRIP.AUTO-MCNC: NEGATIVE MG/DL
LEUKOCYTE ESTERASE UR QL STRIP.AUTO: NEGATIVE
LYMPHOCYTES # BLD AUTO: 1.79 X10(3) UL (ref 0.9–4)
LYMPHOCYTES NFR BLD AUTO: 26.1 %
M PROTEIN MFR SERPL ELPH: 8.1 G/DL (ref 6.1–8.3)
MCH RBC QN AUTO: 31.1 PG (ref 27–33.2)
MCHC RBC AUTO-ENTMCNC: 32.6 G/DL (ref 31–37)
MCV RBC AUTO: 95.4 FL (ref 81–100)
MONOCYTES # BLD AUTO: 0.65 X10(3) UL (ref 0.1–1)
MONOCYTES NFR BLD AUTO: 9.5 %
NEUTROPHIL ABS PRELIM: 4.08 X10 (3) UL (ref 1.3–6.7)
NEUTROPHILS # BLD AUTO: 4.08 X10(3) UL (ref 1.3–6.7)
NEUTROPHILS NFR BLD AUTO: 59.3 %
NITRITE UR QL STRIP.AUTO: NEGATIVE
PH UR STRIP.AUTO: 6 [PH] (ref 4.5–8)
PLATELET # BLD AUTO: 358 10(3)UL (ref 150–450)
POTASSIUM SERPL-SCNC: 3.7 MMOL/L (ref 3.6–5.1)
PROT UR STRIP.AUTO-MCNC: NEGATIVE MG/DL
PSA SERPL DL<=0.01 NG/ML-MCNC: 13.1 SECONDS (ref 12–14.3)
RBC # BLD AUTO: 4.34 X10(6)UL (ref 3.8–5.1)
RBC UR QL AUTO: NEGATIVE
RED CELL DISTRIBUTION WIDTH-SD: 46.6 FL (ref 35.1–46.3)
RH BLOOD TYPE: POSITIVE
SODIUM SERPL-SCNC: 138 MMOL/L (ref 136–144)
SP GR UR STRIP.AUTO: 1.02 (ref 1–1.03)
UROBILINOGEN UR STRIP.AUTO-MCNC: <2 MG/DL
WBC # BLD AUTO: 6.9 X10(3) UL (ref 4–13)

## 2018-03-12 PROCEDURE — 85730 THROMBOPLASTIN TIME PARTIAL: CPT | Performed by: THORACIC SURGERY (CARDIOTHORACIC VASCULAR SURGERY)

## 2018-03-12 PROCEDURE — 85025 COMPLETE CBC W/AUTO DIFF WBC: CPT | Performed by: THORACIC SURGERY (CARDIOTHORACIC VASCULAR SURGERY)

## 2018-03-12 PROCEDURE — 86920 COMPATIBILITY TEST SPIN: CPT

## 2018-03-12 PROCEDURE — 36415 COLL VENOUS BLD VENIPUNCTURE: CPT | Performed by: THORACIC SURGERY (CARDIOTHORACIC VASCULAR SURGERY)

## 2018-03-12 PROCEDURE — 83036 HEMOGLOBIN GLYCOSYLATED A1C: CPT | Performed by: THORACIC SURGERY (CARDIOTHORACIC VASCULAR SURGERY)

## 2018-03-12 PROCEDURE — 81003 URINALYSIS AUTO W/O SCOPE: CPT | Performed by: THORACIC SURGERY (CARDIOTHORACIC VASCULAR SURGERY)

## 2018-03-12 PROCEDURE — 85610 PROTHROMBIN TIME: CPT | Performed by: THORACIC SURGERY (CARDIOTHORACIC VASCULAR SURGERY)

## 2018-03-12 PROCEDURE — 86850 RBC ANTIBODY SCREEN: CPT | Performed by: THORACIC SURGERY (CARDIOTHORACIC VASCULAR SURGERY)

## 2018-03-12 PROCEDURE — 86901 BLOOD TYPING SEROLOGIC RH(D): CPT | Performed by: THORACIC SURGERY (CARDIOTHORACIC VASCULAR SURGERY)

## 2018-03-12 PROCEDURE — 86900 BLOOD TYPING SEROLOGIC ABO: CPT | Performed by: THORACIC SURGERY (CARDIOTHORACIC VASCULAR SURGERY)

## 2018-03-12 PROCEDURE — 80053 COMPREHEN METABOLIC PANEL: CPT | Performed by: THORACIC SURGERY (CARDIOTHORACIC VASCULAR SURGERY)

## 2018-03-12 PROCEDURE — 71045 X-RAY EXAM CHEST 1 VIEW: CPT | Performed by: THORACIC SURGERY (CARDIOTHORACIC VASCULAR SURGERY)

## 2018-03-12 NOTE — PROGRESS NOTES
Here today for PAT CVOR visit. All instructions given, verbalized understanding, arrive at 0600, NPO after midnight, take only propanolol am of with a sip of water, shower with hibicleanse soap evening before and am of surgery.   All PATs complete, needs a

## 2018-03-19 ENCOUNTER — HOSPITAL ENCOUNTER (OUTPATIENT)
Dept: MAMMOGRAPHY | Age: 58
Discharge: HOME | End: 2018-03-19
Attending: FAMILY MEDICINE
Payer: COMMERCIAL

## 2018-03-19 DIAGNOSIS — N60.19 FIBROCYSTIC BREAST DISEASE (FCBD), UNSPECIFIED LATERALITY: ICD-10-CM

## 2018-03-19 DIAGNOSIS — Z12.39 SCREENING FOR BREAST CANCER: ICD-10-CM

## 2018-03-19 PROCEDURE — 77067 SCR MAMMO BI INCL CAD: CPT | Performed by: FAMILY MEDICINE

## 2018-03-22 ENCOUNTER — SURGERY (OUTPATIENT)
Age: 58
End: 2018-03-22

## 2018-03-22 ENCOUNTER — HOSPITAL ENCOUNTER (INPATIENT)
Facility: HOSPITAL | Age: 58
LOS: 4 days | Discharge: HOME HEALTH | DRG: 236 | End: 2018-03-26
Attending: THORACIC SURGERY (CARDIOTHORACIC VASCULAR SURGERY) | Admitting: THORACIC SURGERY (CARDIOTHORACIC VASCULAR SURGERY)
Payer: COMMERCIAL

## 2018-03-22 ENCOUNTER — APPOINTMENT (OUTPATIENT)
Dept: GENERAL RADIOLOGY | Facility: HOSPITAL | Age: 58
DRG: 236 | End: 2018-03-22
Attending: THORACIC SURGERY (CARDIOTHORACIC VASCULAR SURGERY)
Payer: COMMERCIAL

## 2018-03-22 PROBLEM — Z95.1 S/P CABG (CORONARY ARTERY BYPASS GRAFT): Status: ACTIVE | Noted: 2018-03-22

## 2018-03-22 LAB
APTT PPP: 45 SECONDS (ref 25–34)
BUN BLD-MCNC: 10 MG/DL (ref 8–20)
CALCIUM BLD-MCNC: 8.6 MG/DL (ref 8.3–10.3)
CHLORIDE: 114 MMOL/L (ref 101–111)
CHOLEST SMN-MCNC: 251 MG/DL (ref ?–200)
CO2: 21 MMOL/L (ref 22–32)
CREAT BLD-MCNC: 0.91 MG/DL (ref 0.55–1.02)
ERYTHROCYTE [DISTWIDTH] IN BLOOD BY AUTOMATED COUNT: 13.2 % (ref 11.5–16)
GLUCOSE BLD-MCNC: 106 MG/DL (ref 70–99)
GLUCOSE BLD-MCNC: 117 MG/DL (ref 65–99)
GLUCOSE BLD-MCNC: 121 MG/DL (ref 70–99)
GLUCOSE BLD-MCNC: 136 MG/DL (ref 65–99)
GLUCOSE BLD-MCNC: 136 MG/DL (ref 65–99)
GLUCOSE BLD-MCNC: 141 MG/DL (ref 65–99)
GLUCOSE BLD-MCNC: 144 MG/DL (ref 65–99)
GLUCOSE BLD-MCNC: 145 MG/DL (ref 65–99)
GLUCOSE BLD-MCNC: 147 MG/DL (ref 65–99)
GLUCOSE BLD-MCNC: 149 MG/DL (ref 65–99)
GLUCOSE BLD-MCNC: 166 MG/DL (ref 70–99)
GLUCOSE BLD-MCNC: 169 MG/DL (ref 70–99)
GLUCOSE BLD-MCNC: 203 MG/DL (ref 70–99)
GLUCOSE BLD-MCNC: 216 MG/DL (ref 70–99)
HAV IGM SER QL: 2.1 MG/DL (ref 1.7–3)
HCT VFR BLD AUTO: 38.6 % (ref 34–50)
HDLC SERPL-MCNC: 31 MG/DL (ref 45–?)
HDLC SERPL: 8.1 {RATIO} (ref ?–4.44)
HGB BLD-MCNC: 12.9 G/DL (ref 12–16)
ISTAT ACTIVATED CLOTTING TIME: 109 SECONDS (ref 74–137)
ISTAT ACTIVATED CLOTTING TIME: 109 SECONDS (ref 74–137)
ISTAT ACTIVATED CLOTTING TIME: 136 SECONDS (ref 74–137)
ISTAT ACTIVATED CLOTTING TIME: 356 SECONDS (ref 74–137)
ISTAT ACTIVATED CLOTTING TIME: 472 SECONDS (ref 74–137)
ISTAT ACTIVATED CLOTTING TIME: 505 SECONDS (ref 74–137)
ISTAT BLOOD GAS BASE DEFICIT: -2 MMOL/L
ISTAT BLOOD GAS BASE EXCESS: 0 MMOL/L
ISTAT BLOOD GAS BASE EXCESS: 1 MMOL/L
ISTAT BLOOD GAS BASE EXCESS: 1 MMOL/L
ISTAT BLOOD GAS BASE EXCESS: 3 MMOL/L
ISTAT BLOOD GAS BASE EXCESS: 6 MMOL/L
ISTAT BLOOD GAS HCO3: 21.5 MEQ/L (ref 22–26)
ISTAT BLOOD GAS HCO3: 24.1 MEQ/L (ref 22–26)
ISTAT BLOOD GAS HCO3: 25.6 MEQ/L (ref 22–26)
ISTAT BLOOD GAS HCO3: 26.3 MEQ/L (ref 22–26)
ISTAT BLOOD GAS HCO3: 26.6 MEQ/L (ref 22–26)
ISTAT BLOOD GAS HCO3: 29.5 MEQ/L (ref 22–26)
ISTAT BLOOD GAS O2 SATURATION: 100 % (ref 92–100)
ISTAT BLOOD GAS O2 SATURATION: 99 % (ref 92–100)
ISTAT BLOOD GAS O2 SATURATION: 99 % (ref 92–100)
ISTAT BLOOD GAS PCO2: 27 MMHG (ref 35–45)
ISTAT BLOOD GAS PCO2: 32 MMHG (ref 35–45)
ISTAT BLOOD GAS PCO2: 33 MMHG (ref 35–45)
ISTAT BLOOD GAS PCO2: 37 MMHG (ref 35–45)
ISTAT BLOOD GAS PCO2: 38 MMHG (ref 35–45)
ISTAT BLOOD GAS PCO2: 38 MMHG (ref 35–45)
ISTAT BLOOD GAS PH: 7.44 (ref 7.35–7.45)
ISTAT BLOOD GAS PH: 7.44 (ref 7.35–7.45)
ISTAT BLOOD GAS PH: 7.48 (ref 7.35–7.45)
ISTAT BLOOD GAS PH: 7.5 (ref 7.35–7.45)
ISTAT BLOOD GAS PH: 7.51 (ref 7.35–7.45)
ISTAT BLOOD GAS PH: 7.51 (ref 7.35–7.45)
ISTAT BLOOD GAS PO2: 110 MMHG (ref 80–105)
ISTAT BLOOD GAS PO2: 120 MMHG (ref 80–105)
ISTAT BLOOD GAS PO2: 221 MMHG (ref 80–105)
ISTAT BLOOD GAS PO2: 371 MMHG (ref 80–105)
ISTAT BLOOD GAS PO2: 381 MMHG (ref 80–105)
ISTAT BLOOD GAS PO2: 387 MMHG (ref 80–105)
ISTAT BLOOD GAS TCO2: 22 MMOL/L (ref 22–32)
ISTAT BLOOD GAS TCO2: 25 MMOL/L (ref 22–32)
ISTAT BLOOD GAS TCO2: 27 MMOL/L (ref 22–32)
ISTAT BLOOD GAS TCO2: 27 MMOL/L (ref 22–32)
ISTAT BLOOD GAS TCO2: 28 MMOL/L (ref 22–32)
ISTAT BLOOD GAS TCO2: 31 MMOL/L (ref 22–32)
ISTAT HEMATOCRIT: 19 % (ref 34–50)
ISTAT HEMATOCRIT: 19 % (ref 34–50)
ISTAT HEMATOCRIT: 20 % (ref 34–50)
ISTAT HEMATOCRIT: 23 % (ref 34–50)
ISTAT HEMATOCRIT: 33 % (ref 34–50)
ISTAT HEMATOCRIT: 37 % (ref 34–50)
ISTAT IONIZED CALCIUM: 1.04 MMOL/L (ref 1.12–1.32)
ISTAT IONIZED CALCIUM: 1.06 MMOL/L (ref 1.12–1.32)
ISTAT IONIZED CALCIUM: 1.18 MMOL/L (ref 1.12–1.32)
ISTAT IONIZED CALCIUM: 1.27 MMOL/L (ref 1.12–1.32)
ISTAT IONIZED CALCIUM: 1.38 MMOL/L (ref 1.12–1.32)
ISTAT IONIZED CALCIUM: 2 MMOL/L (ref 1.12–1.32)
ISTAT PATIENT TEMPERATURE: 32 DEGREE
ISTAT PATIENT TEMPERATURE: 34 DEGREE
ISTAT PATIENT TEMPERATURE: 37 DEGREE
ISTAT POTASSIUM: 3.3 MMOL/L (ref 3.6–5.1)
ISTAT POTASSIUM: 3.4 MMOL/L (ref 3.6–5.1)
ISTAT POTASSIUM: 3.8 MMOL/L (ref 3.6–5.1)
ISTAT POTASSIUM: 4.8 MMOL/L (ref 3.6–5.1)
ISTAT POTASSIUM: 5.7 MMOL/L (ref 3.6–5.1)
ISTAT POTASSIUM: 5.7 MMOL/L (ref 3.6–5.1)
ISTAT SODIUM: 132 MMOL/L (ref 136–144)
ISTAT SODIUM: 133 MMOL/L (ref 136–144)
ISTAT SODIUM: 137 MMOL/L (ref 136–144)
ISTAT SODIUM: 141 MMOL/L (ref 136–144)
ISTAT SODIUM: 142 MMOL/L (ref 136–144)
ISTAT SODIUM: 145 MMOL/L (ref 136–144)
LDLC SERPL CALC-MCNC: 178 MG/DL (ref ?–130)
MCH RBC QN AUTO: 30.9 PG (ref 27–33.2)
MCHC RBC AUTO-ENTMCNC: 33.4 G/DL (ref 31–37)
MCV RBC AUTO: 92.6 FL (ref 81–100)
NONHDLC SERPL-MCNC: 220 MG/DL (ref ?–130)
PLATELET # BLD AUTO: 183 10(3)UL (ref 150–450)
POTASSIUM SERPL-SCNC: 3.4 MMOL/L (ref 3.6–5.1)
RBC # BLD AUTO: 4.17 X10(6)UL (ref 3.8–5.1)
RED CELL DISTRIBUTION WIDTH-SD: 44.4 FL (ref 35.1–46.3)
SODIUM SERPL-SCNC: 145 MMOL/L (ref 136–144)
TRIGL SERPL-MCNC: 210 MG/DL (ref ?–150)
VLDLC SERPL CALC-MCNC: 42 MG/DL (ref 5–40)
WBC # BLD AUTO: 14.9 X10(3) UL (ref 4–13)

## 2018-03-22 PROCEDURE — S0028 INJECTION, FAMOTIDINE, 20 MG: HCPCS

## 2018-03-22 PROCEDURE — 82330 ASSAY OF CALCIUM: CPT

## 2018-03-22 PROCEDURE — P9045 ALBUMIN (HUMAN), 5%, 250 ML: HCPCS | Performed by: THORACIC SURGERY (CARDIOTHORACIC VASCULAR SURGERY)

## 2018-03-22 PROCEDURE — 94002 VENT MGMT INPAT INIT DAY: CPT

## 2018-03-22 PROCEDURE — 82803 BLOOD GASES ANY COMBINATION: CPT

## 2018-03-22 PROCEDURE — 93010 ELECTROCARDIOGRAM REPORT: CPT | Performed by: INTERNAL MEDICINE

## 2018-03-22 PROCEDURE — 5A1221Z PERFORMANCE OF CARDIAC OUTPUT, CONTINUOUS: ICD-10-PCS | Performed by: THORACIC SURGERY (CARDIOTHORACIC VASCULAR SURGERY)

## 2018-03-22 PROCEDURE — 71045 X-RAY EXAM CHEST 1 VIEW: CPT | Performed by: THORACIC SURGERY (CARDIOTHORACIC VASCULAR SURGERY)

## 2018-03-22 PROCEDURE — 85347 COAGULATION TIME ACTIVATED: CPT

## 2018-03-22 PROCEDURE — 83735 ASSAY OF MAGNESIUM: CPT | Performed by: THORACIC SURGERY (CARDIOTHORACIC VASCULAR SURGERY)

## 2018-03-22 PROCEDURE — 80061 LIPID PANEL: CPT | Performed by: THORACIC SURGERY (CARDIOTHORACIC VASCULAR SURGERY)

## 2018-03-22 PROCEDURE — 83050 HGB METHEMOGLOBIN QUAN: CPT | Performed by: ANESTHESIOLOGY

## 2018-03-22 PROCEDURE — B24BZZ4 ULTRASONOGRAPHY OF HEART WITH AORTA, TRANSESOPHAGEAL: ICD-10-PCS | Performed by: THORACIC SURGERY (CARDIOTHORACIC VASCULAR SURGERY)

## 2018-03-22 PROCEDURE — 85027 COMPLETE CBC AUTOMATED: CPT | Performed by: THORACIC SURGERY (CARDIOTHORACIC VASCULAR SURGERY)

## 2018-03-22 PROCEDURE — 85018 HEMOGLOBIN: CPT | Performed by: ANESTHESIOLOGY

## 2018-03-22 PROCEDURE — 84295 ASSAY OF SERUM SODIUM: CPT

## 2018-03-22 PROCEDURE — 84132 ASSAY OF SERUM POTASSIUM: CPT

## 2018-03-22 PROCEDURE — 94150 VITAL CAPACITY TEST: CPT

## 2018-03-22 PROCEDURE — 82803 BLOOD GASES ANY COMBINATION: CPT | Performed by: ANESTHESIOLOGY

## 2018-03-22 PROCEDURE — 80048 BASIC METABOLIC PNL TOTAL CA: CPT | Performed by: THORACIC SURGERY (CARDIOTHORACIC VASCULAR SURGERY)

## 2018-03-22 PROCEDURE — 021009W BYPASS CORONARY ARTERY, ONE ARTERY FROM AORTA WITH AUTOLOGOUS VENOUS TISSUE, OPEN APPROACH: ICD-10-PCS | Performed by: THORACIC SURGERY (CARDIOTHORACIC VASCULAR SURGERY)

## 2018-03-22 PROCEDURE — 85014 HEMATOCRIT: CPT

## 2018-03-22 PROCEDURE — 93005 ELECTROCARDIOGRAM TRACING: CPT

## 2018-03-22 PROCEDURE — 30233N0 TRANSFUSION OF AUTOLOGOUS RED BLOOD CELLS INTO PERIPHERAL VEIN, PERCUTANEOUS APPROACH: ICD-10-PCS | Performed by: THORACIC SURGERY (CARDIOTHORACIC VASCULAR SURGERY)

## 2018-03-22 PROCEDURE — 82375 ASSAY CARBOXYHB QUANT: CPT | Performed by: ANESTHESIOLOGY

## 2018-03-22 PROCEDURE — 82962 GLUCOSE BLOOD TEST: CPT

## 2018-03-22 PROCEDURE — 06BQ4ZZ EXCISION OF LEFT SAPHENOUS VEIN, PERCUTANEOUS ENDOSCOPIC APPROACH: ICD-10-PCS | Performed by: THORACIC SURGERY (CARDIOTHORACIC VASCULAR SURGERY)

## 2018-03-22 PROCEDURE — 85730 THROMBOPLASTIN TIME PARTIAL: CPT | Performed by: THORACIC SURGERY (CARDIOTHORACIC VASCULAR SURGERY)

## 2018-03-22 PROCEDURE — 02100Z9 BYPASS CORONARY ARTERY, ONE ARTERY FROM LEFT INTERNAL MAMMARY, OPEN APPROACH: ICD-10-PCS | Performed by: THORACIC SURGERY (CARDIOTHORACIC VASCULAR SURGERY)

## 2018-03-22 RX ORDER — ASPIRIN 300 MG
300 SUPPOSITORY, RECTAL RECTAL DAILY
Status: DISCONTINUED | OUTPATIENT
Start: 2018-03-23 | End: 2018-03-24

## 2018-03-22 RX ORDER — IPRATROPIUM BROMIDE AND ALBUTEROL SULFATE 2.5; .5 MG/3ML; MG/3ML
3 SOLUTION RESPIRATORY (INHALATION) EVERY 4 HOURS PRN
Status: DISCONTINUED | OUTPATIENT
Start: 2018-03-22 | End: 2018-03-26

## 2018-03-22 RX ORDER — HYDROCODONE BITARTRATE AND ACETAMINOPHEN 10; 325 MG/1; MG/1
1 TABLET ORAL EVERY 4 HOURS PRN
Status: DISCONTINUED | OUTPATIENT
Start: 2018-03-22 | End: 2018-03-26

## 2018-03-22 RX ORDER — FAMOTIDINE 10 MG/ML
20 INJECTION, SOLUTION INTRAVENOUS 2 TIMES DAILY
Status: DISCONTINUED | OUTPATIENT
Start: 2018-03-22 | End: 2018-03-24

## 2018-03-22 RX ORDER — ASPIRIN 300 MG
300 SUPPOSITORY, RECTAL RECTAL ONCE
Status: COMPLETED | OUTPATIENT
Start: 2018-03-22 | End: 2018-03-22

## 2018-03-22 RX ORDER — POTASSIUM CHLORIDE 29.8 MG/ML
40 INJECTION INTRAVENOUS AS NEEDED
Status: DISCONTINUED | OUTPATIENT
Start: 2018-03-22 | End: 2018-03-26

## 2018-03-22 RX ORDER — ONDANSETRON 2 MG/ML
4 INJECTION INTRAMUSCULAR; INTRAVENOUS EVERY 6 HOURS PRN
Status: DISCONTINUED | OUTPATIENT
Start: 2018-03-22 | End: 2018-03-24

## 2018-03-22 RX ORDER — NALOXONE HYDROCHLORIDE 0.4 MG/ML
0.08 INJECTION, SOLUTION INTRAMUSCULAR; INTRAVENOUS; SUBCUTANEOUS
Status: DISCONTINUED | OUTPATIENT
Start: 2018-03-22 | End: 2018-03-26

## 2018-03-22 RX ORDER — ONDANSETRON 2 MG/ML
4 INJECTION INTRAMUSCULAR; INTRAVENOUS EVERY 6 HOURS PRN
Status: DISCONTINUED | OUTPATIENT
Start: 2018-03-22 | End: 2018-03-26

## 2018-03-22 RX ORDER — DOCUSATE SODIUM 100 MG/1
100 CAPSULE, LIQUID FILLED ORAL 2 TIMES DAILY
Status: DISCONTINUED | OUTPATIENT
Start: 2018-03-22 | End: 2018-03-26

## 2018-03-22 RX ORDER — DEXTROSE AND SODIUM CHLORIDE 5; .45 G/100ML; G/100ML
INJECTION, SOLUTION INTRAVENOUS CONTINUOUS
Status: ACTIVE | OUTPATIENT
Start: 2018-03-22 | End: 2018-03-23

## 2018-03-22 RX ORDER — MAGNESIUM SULFATE 1 G/100ML
1 INJECTION INTRAVENOUS AS NEEDED
Status: DISCONTINUED | OUTPATIENT
Start: 2018-03-22 | End: 2018-03-26

## 2018-03-22 RX ORDER — DIPHENHYDRAMINE HYDROCHLORIDE 50 MG/ML
12.5 INJECTION INTRAMUSCULAR; INTRAVENOUS EVERY 4 HOURS PRN
Status: DISCONTINUED | OUTPATIENT
Start: 2018-03-22 | End: 2018-03-26

## 2018-03-22 RX ORDER — CEFAZOLIN SODIUM 1 G/3ML
INJECTION, POWDER, FOR SOLUTION INTRAMUSCULAR; INTRAVENOUS
Status: DISCONTINUED | OUTPATIENT
Start: 2018-03-22 | End: 2018-03-26

## 2018-03-22 RX ORDER — ROSUVASTATIN CALCIUM 20 MG/1
40 TABLET, COATED ORAL DAILY
Status: DISCONTINUED | OUTPATIENT
Start: 2018-03-22 | End: 2018-03-26

## 2018-03-22 RX ORDER — CEFAZOLIN SODIUM/WATER 2 G/20 ML
2 SYRINGE (ML) INTRAVENOUS EVERY 8 HOURS
Status: COMPLETED | OUTPATIENT
Start: 2018-03-22 | End: 2018-03-24

## 2018-03-22 RX ORDER — POTASSIUM CHLORIDE 14.9 MG/ML
20 INJECTION INTRAVENOUS AS NEEDED
Status: DISCONTINUED | OUTPATIENT
Start: 2018-03-22 | End: 2018-03-26

## 2018-03-22 RX ORDER — DEXMEDETOMIDINE HYDROCHLORIDE 4 UG/ML
INJECTION, SOLUTION INTRAVENOUS CONTINUOUS
Status: DISCONTINUED | OUTPATIENT
Start: 2018-03-22 | End: 2018-03-23

## 2018-03-22 RX ORDER — ASPIRIN 325 MG
325 TABLET ORAL ONCE
Status: COMPLETED | OUTPATIENT
Start: 2018-03-22 | End: 2018-03-22

## 2018-03-22 RX ORDER — FAMOTIDINE 20 MG/1
20 TABLET ORAL 2 TIMES DAILY
Status: DISCONTINUED | OUTPATIENT
Start: 2018-03-22 | End: 2018-03-26

## 2018-03-22 RX ORDER — MAGNESIUM SULFATE HEPTAHYDRATE 40 MG/ML
2 INJECTION, SOLUTION INTRAVENOUS AS NEEDED
Status: DISCONTINUED | OUTPATIENT
Start: 2018-03-22 | End: 2018-03-26

## 2018-03-22 RX ORDER — POLYETHYLENE GLYCOL 3350 17 G/17G
1 POWDER, FOR SOLUTION ORAL DAILY PRN
Status: DISCONTINUED | OUTPATIENT
Start: 2018-03-22 | End: 2018-03-26

## 2018-03-22 RX ORDER — ALBUMIN, HUMAN INJ 5% 5 %
250 SOLUTION INTRAVENOUS ONCE AS NEEDED
Status: COMPLETED | OUTPATIENT
Start: 2018-03-22 | End: 2018-03-22

## 2018-03-22 RX ORDER — BISACODYL 10 MG
10 SUPPOSITORY, RECTAL RECTAL
Status: DISCONTINUED | OUTPATIENT
Start: 2018-03-22 | End: 2018-03-26

## 2018-03-22 RX ORDER — TEMAZEPAM 15 MG/1
15 CAPSULE ORAL NIGHTLY PRN
Status: DISCONTINUED | OUTPATIENT
Start: 2018-03-22 | End: 2018-03-26

## 2018-03-22 RX ORDER — SODIUM CHLORIDE 9 MG/ML
INJECTION, SOLUTION INTRAVENOUS CONTINUOUS
Status: DISCONTINUED | OUTPATIENT
Start: 2018-03-22 | End: 2018-03-24

## 2018-03-22 RX ORDER — MORPHINE SULFATE 4 MG/ML
8 INJECTION, SOLUTION INTRAMUSCULAR; INTRAVENOUS
Status: DISCONTINUED | OUTPATIENT
Start: 2018-03-22 | End: 2018-03-26

## 2018-03-22 RX ORDER — NALBUPHINE HCL 10 MG/ML
2.5 AMPUL (ML) INJECTION EVERY 4 HOURS PRN
Status: DISCONTINUED | OUTPATIENT
Start: 2018-03-22 | End: 2018-03-26

## 2018-03-22 RX ORDER — DEXTROSE MONOHYDRATE 25 G/50ML
50 INJECTION, SOLUTION INTRAVENOUS
Status: DISCONTINUED | OUTPATIENT
Start: 2018-03-22 | End: 2018-03-23 | Stop reason: SDUPTHER

## 2018-03-22 RX ORDER — ASPIRIN 325 MG
325 TABLET, DELAYED RELEASE (ENTERIC COATED) ORAL DAILY
Status: DISCONTINUED | OUTPATIENT
Start: 2018-03-23 | End: 2018-03-26

## 2018-03-22 RX ORDER — HYDROCODONE BITARTRATE AND ACETAMINOPHEN 10; 325 MG/1; MG/1
2 TABLET ORAL EVERY 4 HOURS PRN
Status: DISCONTINUED | OUTPATIENT
Start: 2018-03-22 | End: 2018-03-26

## 2018-03-22 RX ORDER — DEXTROSE AND SODIUM CHLORIDE 5; .45 G/100ML; G/100ML
INJECTION, SOLUTION INTRAVENOUS CONTINUOUS
Status: ACTIVE | OUTPATIENT
Start: 2018-03-23 | End: 2018-03-23

## 2018-03-22 RX ORDER — NITROGLYCERIN 20 MG/100ML
INJECTION INTRAVENOUS CONTINUOUS PRN
Status: DISCONTINUED | OUTPATIENT
Start: 2018-03-22 | End: 2018-03-26

## 2018-03-22 RX ORDER — CHLORHEXIDINE GLUCONATE 0.12 MG/ML
15 RINSE ORAL
Status: DISCONTINUED | OUTPATIENT
Start: 2018-03-22 | End: 2018-03-23

## 2018-03-22 RX ORDER — MIDAZOLAM HYDROCHLORIDE 1 MG/ML
1 INJECTION INTRAMUSCULAR; INTRAVENOUS EVERY 30 MIN PRN
Status: DISCONTINUED | OUTPATIENT
Start: 2018-03-22 | End: 2018-03-26

## 2018-03-22 RX ORDER — MORPHINE SULFATE 4 MG/ML
2 INJECTION, SOLUTION INTRAMUSCULAR; INTRAVENOUS
Status: DISCONTINUED | OUTPATIENT
Start: 2018-03-22 | End: 2018-03-26

## 2018-03-22 RX ORDER — MORPHINE SULFATE 4 MG/ML
4 INJECTION, SOLUTION INTRAMUSCULAR; INTRAVENOUS
Status: DISCONTINUED | OUTPATIENT
Start: 2018-03-22 | End: 2018-03-26

## 2018-03-22 NOTE — CONSULTS
[facility] Pharmacy Note:  Pain Consult    Omar Perdomo is a 60year old female started on Dilaudid PCA by Dr. Cynthia Dugan. Pharmacy was consulted to review medication profile and to discontinue previously ordered narcotics and sedatives.     Medication profile was r

## 2018-03-22 NOTE — PROGRESS NOTES
Anesthesia Ventilator Management    Patient is s/p CABG x 2  POD#0. Patient is currently sedated and intubated. Vent settings: PRVC 450/12/5/50%  ABG, CXR pending    Will wean FiO2 as tolerated.   Plan for extubation after CPAP trial.      Jeanne Morrissey  Pager

## 2018-03-22 NOTE — HISTORICAL OFFICE NOTE
Abby Linsday  : 1960  ACCOUNT:  [id number]  [phone]  PCP: Dr. Paco Ocampo     TODAY'S DATE: 2018  DICTATED BY:  [Dr. Arlene Jo: [hospital discharge.]    HPI:    [On 2018, Tavo Presley, a 60year old f EXERCISE: no regular exercise. DIET: no special diet. MARITAL STATUS: . LIFESTYLE: active lifestyle. EDUCATION: post graduate degree.  OCCUPATION:  and [place] at [address]: No Known Allergies    MEDICATIONS: Selec Hypercholesterolemia, familial  2. Hypertension, benign  3. Metabolic syndrome  4. Obesity      PLAN:  [Continue Crestor 40 mg daily. We will add Repatha 140 mg subcu every 2 weeks and get follow-up lipids in 1 month.   The patient has had denial of covera

## 2018-03-23 ENCOUNTER — APPOINTMENT (OUTPATIENT)
Dept: GENERAL RADIOLOGY | Facility: HOSPITAL | Age: 58
DRG: 236 | End: 2018-03-23
Attending: THORACIC SURGERY (CARDIOTHORACIC VASCULAR SURGERY)
Payer: COMMERCIAL

## 2018-03-23 LAB
ARTERIAL BLD GAS O2 SATURATION: 97 % (ref 92–100)
ARTERIAL BLOOD GAS BASE EXCESS: -1.9
ARTERIAL BLOOD GAS HCO3: 22.7 MEQ/L (ref 22–26)
ARTERIAL BLOOD GAS PCO2: 38 MM HG (ref 35–45)
ARTERIAL BLOOD GAS PH: 7.39 (ref 7.35–7.45)
ARTERIAL BLOOD GAS PO2: 131 MM HG (ref 80–105)
ATRIAL RATE: 72 BPM
ATRIAL RATE: 75 BPM
ATRIAL RATE: 98 BPM
BASOPHILS # BLD AUTO: 0.01 X10(3) UL (ref 0–0.1)
BASOPHILS NFR BLD AUTO: 0.1 %
BUN BLD-MCNC: 12 MG/DL (ref 8–20)
CALCIUM BLD-MCNC: 7.7 MG/DL (ref 8.3–10.3)
CALCULATED O2 SATURATION: 99 % (ref 92–100)
CARBOXYHEMOGLOBIN: 1.1 % SAT (ref 0–3)
CHLORIDE: 116 MMOL/L (ref 101–111)
CO2: 24 MMOL/L (ref 22–32)
CREAT BLD-MCNC: 0.83 MG/DL (ref 0.55–1.02)
EOSINOPHIL # BLD AUTO: 0 X10(3) UL (ref 0–0.3)
EOSINOPHIL NFR BLD AUTO: 0 %
ERYTHROCYTE [DISTWIDTH] IN BLOOD BY AUTOMATED COUNT: 13.5 % (ref 11.5–16)
ERYTHROCYTE [DISTWIDTH] IN BLOOD BY AUTOMATED COUNT: 13.7 % (ref 11.5–16)
FIO2: 40 %
GLUCOSE BLD-MCNC: 127 MG/DL (ref 65–99)
GLUCOSE BLD-MCNC: 130 MG/DL (ref 65–99)
GLUCOSE BLD-MCNC: 130 MG/DL (ref 65–99)
GLUCOSE BLD-MCNC: 133 MG/DL (ref 65–99)
GLUCOSE BLD-MCNC: 133 MG/DL (ref 65–99)
GLUCOSE BLD-MCNC: 137 MG/DL (ref 70–99)
GLUCOSE BLD-MCNC: 138 MG/DL (ref 65–99)
GLUCOSE BLD-MCNC: 140 MG/DL (ref 65–99)
GLUCOSE BLD-MCNC: 140 MG/DL (ref 65–99)
GLUCOSE BLD-MCNC: 141 MG/DL (ref 65–99)
GLUCOSE BLD-MCNC: 143 MG/DL (ref 65–99)
GLUCOSE BLD-MCNC: 145 MG/DL (ref 65–99)
GLUCOSE BLD-MCNC: 149 MG/DL (ref 65–99)
GLUCOSE BLD-MCNC: 173 MG/DL (ref 65–99)
HAV IGM SER QL: 1.9 MG/DL (ref 1.7–3)
HCT VFR BLD AUTO: 27.7 % (ref 34–50)
HCT VFR BLD AUTO: 33.8 % (ref 34–50)
HGB BLD-MCNC: 11 G/DL (ref 12–16)
HGB BLD-MCNC: 8.8 G/DL (ref 12–16)
IMMATURE GRANULOCYTE COUNT: 0.05 X10(3) UL (ref 0–1)
IMMATURE GRANULOCYTE RATIO %: 0.4 %
LYMPHOCYTES # BLD AUTO: 0.74 X10(3) UL (ref 0.9–4)
LYMPHOCYTES NFR BLD AUTO: 5.4 %
MCH RBC QN AUTO: 31 PG (ref 27–33.2)
MCH RBC QN AUTO: 31.2 PG (ref 27–33.2)
MCHC RBC AUTO-ENTMCNC: 31.8 G/DL (ref 31–37)
MCHC RBC AUTO-ENTMCNC: 32.5 G/DL (ref 31–37)
MCV RBC AUTO: 95.8 FL (ref 81–100)
MCV RBC AUTO: 97.5 FL (ref 81–100)
METHEMOGLOBIN: 0.6 % SAT (ref 0.4–1.5)
MONOCYTES # BLD AUTO: 0.67 X10(3) UL (ref 0.1–1)
MONOCYTES NFR BLD AUTO: 4.9 %
NEUTROPHIL ABS PRELIM: 12.24 X10 (3) UL (ref 1.3–6.7)
NEUTROPHILS # BLD AUTO: 12.24 X10(3) UL (ref 1.3–6.7)
NEUTROPHILS NFR BLD AUTO: 89.2 %
P AXIS: -13 DEGREES
P AXIS: -18 DEGREES
P AXIS: 22 DEGREES
P-R INTERVAL: 150 MS
P-R INTERVAL: 154 MS
P-R INTERVAL: 154 MS
P/F RATIO: 331.7 MMHG
PATIENT TEMPERATURE: 98 F
PEEP: 5 CM H2O
PLATELET # BLD AUTO: 137 10(3)UL (ref 150–450)
PLATELET # BLD AUTO: 168 10(3)UL (ref 150–450)
POTASSIUM SERPL-SCNC: 4.1 MMOL/L (ref 3.6–5.1)
PRESSURE SUPPORT: 5 CM H2O
Q-T INTERVAL: 378 MS
Q-T INTERVAL: 428 MS
Q-T INTERVAL: 432 MS
QRS DURATION: 72 MS
QRS DURATION: 80 MS
QRS DURATION: 82 MS
QTC CALCULATION (BEZET): 468 MS
QTC CALCULATION (BEZET): 482 MS
QTC CALCULATION (BEZET): 482 MS
R AXIS: 11 DEGREES
R AXIS: 16 DEGREES
R AXIS: 24 DEGREES
RBC # BLD AUTO: 2.84 X10(6)UL (ref 3.8–5.1)
RBC # BLD AUTO: 3.53 X10(6)UL (ref 3.8–5.1)
RED CELL DISTRIBUTION WIDTH-SD: 47.1 FL (ref 35.1–46.3)
RED CELL DISTRIBUTION WIDTH-SD: 49.3 FL (ref 35.1–46.3)
SODIUM SERPL-SCNC: 148 MMOL/L (ref 136–144)
T AXIS: 16 DEGREES
T AXIS: 17 DEGREES
T AXIS: 53 DEGREES
TOTAL HEMOGLOBIN: 13.6 G/DL (ref 11.7–16)
VENTRICULAR RATE: 72 BPM
VENTRICULAR RATE: 75 BPM
VENTRICULAR RATE: 98 BPM
WBC # BLD AUTO: 13.7 X10(3) UL (ref 4–13)
WBC # BLD AUTO: 17 X10(3) UL (ref 4–13)

## 2018-03-23 PROCEDURE — 82962 GLUCOSE BLOOD TEST: CPT

## 2018-03-23 PROCEDURE — 93010 ELECTROCARDIOGRAM REPORT: CPT | Performed by: INTERNAL MEDICINE

## 2018-03-23 PROCEDURE — 85025 COMPLETE CBC W/AUTO DIFF WBC: CPT | Performed by: THORACIC SURGERY (CARDIOTHORACIC VASCULAR SURGERY)

## 2018-03-23 PROCEDURE — 93005 ELECTROCARDIOGRAM TRACING: CPT

## 2018-03-23 PROCEDURE — 97530 THERAPEUTIC ACTIVITIES: CPT

## 2018-03-23 PROCEDURE — 71045 X-RAY EXAM CHEST 1 VIEW: CPT | Performed by: THORACIC SURGERY (CARDIOTHORACIC VASCULAR SURGERY)

## 2018-03-23 PROCEDURE — P9045 ALBUMIN (HUMAN), 5%, 250 ML: HCPCS | Performed by: THORACIC SURGERY (CARDIOTHORACIC VASCULAR SURGERY)

## 2018-03-23 PROCEDURE — P9045 ALBUMIN (HUMAN), 5%, 250 ML: HCPCS

## 2018-03-23 PROCEDURE — 85027 COMPLETE CBC AUTOMATED: CPT | Performed by: THORACIC SURGERY (CARDIOTHORACIC VASCULAR SURGERY)

## 2018-03-23 PROCEDURE — 80048 BASIC METABOLIC PNL TOTAL CA: CPT | Performed by: THORACIC SURGERY (CARDIOTHORACIC VASCULAR SURGERY)

## 2018-03-23 PROCEDURE — 83735 ASSAY OF MAGNESIUM: CPT | Performed by: THORACIC SURGERY (CARDIOTHORACIC VASCULAR SURGERY)

## 2018-03-23 PROCEDURE — 97161 PT EVAL LOW COMPLEX 20 MIN: CPT

## 2018-03-23 PROCEDURE — 97166 OT EVAL MOD COMPLEX 45 MIN: CPT

## 2018-03-23 RX ORDER — DEXTROSE MONOHYDRATE 25 G/50ML
50 INJECTION, SOLUTION INTRAVENOUS
Status: DISCONTINUED | OUTPATIENT
Start: 2018-03-23 | End: 2018-03-26

## 2018-03-23 RX ORDER — ALBUMIN, HUMAN INJ 5% 5 %
SOLUTION INTRAVENOUS
Status: COMPLETED
Start: 2018-03-23 | End: 2018-03-23

## 2018-03-23 RX ORDER — ASPIRIN 325 MG
325 TABLET ORAL ONCE
Status: DISCONTINUED | OUTPATIENT
Start: 2018-03-23 | End: 2018-03-23 | Stop reason: CLARIF

## 2018-03-23 RX ORDER — ALBUMIN, HUMAN INJ 5% 5 %
500 SOLUTION INTRAVENOUS ONCE
Status: COMPLETED | OUTPATIENT
Start: 2018-03-23 | End: 2018-03-23

## 2018-03-23 RX ORDER — ALBUMIN, HUMAN INJ 5% 5 %
250 SOLUTION INTRAVENOUS ONCE
Status: COMPLETED | OUTPATIENT
Start: 2018-03-23 | End: 2018-03-23

## 2018-03-23 RX ORDER — ASPIRIN 300 MG
300 SUPPOSITORY, RECTAL RECTAL ONCE
Status: DISCONTINUED | OUTPATIENT
Start: 2018-03-23 | End: 2018-03-23 | Stop reason: CLARIF

## 2018-03-23 NOTE — OCCUPATIONAL THERAPY NOTE
OT order received. Attempted to see pt for eval, but spoke with RN who stated pt would be more appropriate in the afternoon. Will try again later today as appropriate and as time permits.

## 2018-03-23 NOTE — PROGRESS NOTES
Assumed care of patient at 0730. Patient alert and oriented X 4. Levophed and Dobutamine infusing at 2.5 mcg and 3 mcg respectively.   While attempting to wean levophed and dobutamine as ordered, patient's SBP dropped into the 60's and heart rate started

## 2018-03-23 NOTE — PROGRESS NOTES
Received patient from the CVOR sedated and vented. Propofol and Precedex infusing. Dilaudid PCA started as ordered. When sedation paused, patient opened eyes and followed commands. Dobutamine infusing at 3 mcg. Levophed on and off to titrate for SBP.

## 2018-03-23 NOTE — OPERATIVE REPORT
[facility]    PATIENT'S NAME: Enedina Mcgowan   ATTENDING PHYSICIAN: Latoya Stinson MD   OPERATING PHYSICIAN: Latoya Stinson MD   PATIENT ACCOUNT#:   [id number]    LOCATION:  [address] [place] [address]  MEDICAL RECORD #:   [id number]       YOB: 1960  AD the heart. The coronaries were inspected and found to be severely diseased as one might expect with this level of lipid disorder. I initially explored the right posterior descending as the target, but this artery was just too severely calcified.   I was a

## 2018-03-23 NOTE — PROGRESS NOTES
[facility]  CV Surgery Progress Note    Dane Peoples Patient Status:  Inpatient    1960 MRN [id number]   [facility] 6NE-A Attending Lilli Lance MD   Hosp Day # 1 PCP Cecille Hinojosa DO     Subjective:  Pt is doing well overall.  Ila Keys

## 2018-03-23 NOTE — PAYOR COMM NOTE
--------------  ADMISSION REVIEW     Payor: [facility]  Subscriber #:  [id number]  Authorization Number: [id number]    Admit date: 3/22/18  Admit time: [address]       Admitting Physician: Billy Handy MD  Attending Physician:  Billy Handy MD Carotid pulses are equal bilaterally. There are no bruits. The lungs are clear without rales, rhonchi, or wheezing. The spine is straight and in the midline. Cardiac examination shows a regular rhythm. S1 and S2 are normal.  There is no murmur.   There  03/23/2018   CA 7.7 03/23/2018        Assessment/Plan: S/P CABG POD#1  1)HD Stable - wean Dobs/Levo   2)Renal/Neuro Fun intact   3)CT Management - ok to remove   4)Pain Management - Cont PCA x24 hrs, add Norco   5)Encourage IS/Ambulate when able %-0.45 % NaCl infusion     Date Action Dose Route User    3/22/2018 2000 Rate/Dose Verify (none) Intravenous Jaimie Tsai RN    3/22/2018 1629 New Bag (none) Intravenous Bria Evangelista RN      dextrose 5 %-0.45 % NaCl infusion     Date Action Dose Route 3/22/2018 2008 Given 100 mg Oral Wilfrido De León, RN      EPINEPHrine HCl (ADRENALIN) 4 mg in sodium chloride 0.9 % 250 mL infusion     Date Action Dose Route User    3/22/2018 1825 Restarted 2 mcg/min Intravenous Justine Royal RN      famoTIDine (PEPCID) Given 25 mg Oral Luke Farris RN      morphINE sulfate (PF) 4 MG/ML injection 2 mg     Date Action Dose Route User    3/23/2018 0002 Given 2 mg Intravenous Luke Farris RN      mupirocin (BACTROBAN) 2% nasal ointment OINT 1 Application     Date Actio potassium chloride IVPB premix 40 mEq     Date Action Dose Route User    3/22/2018 1825 New Bag 40 mEq Intravenous Judi Rebolledo RN      propofol (DIPRIVAN) infusion     Date Action Dose Route User    3/22/2018 2000 Rate/Dose Verify 3 mcg/kg/min × 76.7

## 2018-03-23 NOTE — DIETARY NOTE
Nutrition Short Note    Dietitian consult received per cardiac rehab/CHF protocol. Pt to be educated by cardiac rehab staff and encouraged to attend outpatient classes taught by RD. RD available PRN.     Janeth Chacko MS, RD, LDN

## 2018-03-23 NOTE — CM/SW NOTE
03/23/18 1100   CM/SW Referral Data   Referral Source Physician   Reason for Referral Discharge planning;Protocol order set   Specify order set CABG   Informant Patient; Children   Patient Info   Patient's Mental Status Alert;Oriented   Patient's Home En

## 2018-03-23 NOTE — HOME CARE LIAISON
[address] [facility],[address] HH.  WILL SEE PTNT WHEN APPROPRIATE    THANKS  Sharlene Kovacs

## 2018-03-23 NOTE — CONSULTS
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 Neck: No JVD, carotids 2+ no bruits. Cardiac: nml  S1-S2 no murmur no rub  Lungs: Diminished bilaterally but clear  Abdomen: Soft, non-tender.    Extremities: Without clubbing, cyanosis or

## 2018-03-23 NOTE — OCCUPATIONAL THERAPY NOTE
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 : A Little  -   Putting on and taking off regular upper body clothing?: A Little  -   Taking care of personal grooming such as brushing teeth?: A Little  -   Eating meals?: None    AM-PA OT to address the above deficits, maximizing patient’s ability to return safely to her prior level of function.     Patient Complexity  Occupational Profile/Medical History MODERATE - Expanded review of history including review of medical or therapy record

## 2018-03-23 NOTE — PLAN OF CARE
Assumed care of this patient at [address] [place] [address]. Precedex and propofol gtt's decreased to assess patients' neurological status: Patient was able to move all extremities, open eyes, and follows commands.  CPAP trial, weaning parameters and ABG collected and reported to

## 2018-03-23 NOTE — PHYSICAL THERAPY NOTE
PHYSICAL THERAPY EVALUATION - INPATIENT     Room Number: 1305/6163-W  Evaluation Date: 3/23/2018  Type of Evaluation: Initial  Physician Order: PT Eval and Treat    Presenting Problem: s/p CABG 3/22/18  Reason for Therapy: Mobility Dysfunction and Nilson Friends 1  Railing: No  Stairs to Bedroom: 0  Railing: No    Lives With: Son  Drives: Yes  Patient Owned Equipment: None  Patient Regularly Uses: Reading glasses    Prior Level of [place]: Pt is previously independent in all functional mobility and ADLs.  Pt d Moving to and from a bed to a chair (including a wheelchair)?: A Little   -   Need to walk in hospital room?: A Little   -   Climbing 3-5 steps with a railing?: A Little       AM-PAC Score:  Raw Score: 19   PT Approx Degree of Impairment Score: 41.77%   St demonstrating a 41.77% degree of impairment in mobility. Research supports that patients with this level of impairment may benefit from home w/home health PT.     Based on this evaluation, patient's clinical presentation is stable and overall the evaluation

## 2018-03-24 LAB
BUN BLD-MCNC: 13 MG/DL (ref 8–20)
CALCIUM BLD-MCNC: 7.8 MG/DL (ref 8.3–10.3)
CHLORIDE: 113 MMOL/L (ref 101–111)
CO2: 23 MMOL/L (ref 22–32)
CREAT BLD-MCNC: 0.61 MG/DL (ref 0.55–1.02)
GLUCOSE BLD-MCNC: 111 MG/DL (ref 65–99)
GLUCOSE BLD-MCNC: 116 MG/DL (ref 65–99)
GLUCOSE BLD-MCNC: 126 MG/DL (ref 65–99)
GLUCOSE BLD-MCNC: 139 MG/DL (ref 70–99)
GLUCOSE BLD-MCNC: 141 MG/DL (ref 65–99)
GLUCOSE BLD-MCNC: 151 MG/DL (ref 65–99)
GLUCOSE BLD-MCNC: 245 MG/DL (ref 65–99)
POTASSIUM SERPL-SCNC: 4.1 MMOL/L (ref 3.6–5.1)
SODIUM SERPL-SCNC: 144 MMOL/L (ref 136–144)

## 2018-03-24 PROCEDURE — S0028 INJECTION, FAMOTIDINE, 20 MG: HCPCS | Performed by: THORACIC SURGERY (CARDIOTHORACIC VASCULAR SURGERY)

## 2018-03-24 PROCEDURE — 97110 THERAPEUTIC EXERCISES: CPT

## 2018-03-24 PROCEDURE — 80048 BASIC METABOLIC PNL TOTAL CA: CPT | Performed by: THORACIC SURGERY (CARDIOTHORACIC VASCULAR SURGERY)

## 2018-03-24 PROCEDURE — 97530 THERAPEUTIC ACTIVITIES: CPT

## 2018-03-24 PROCEDURE — 97535 SELF CARE MNGMENT TRAINING: CPT

## 2018-03-24 PROCEDURE — 82962 GLUCOSE BLOOD TEST: CPT

## 2018-03-24 RX ORDER — METOPROLOL TARTRATE 50 MG/1
50 TABLET, FILM COATED ORAL
Status: DISCONTINUED | OUTPATIENT
Start: 2018-03-24 | End: 2018-03-26

## 2018-03-24 RX ORDER — FUROSEMIDE 20 MG/1
20 TABLET ORAL DAILY
Status: DISCONTINUED | OUTPATIENT
Start: 2018-03-25 | End: 2018-03-26

## 2018-03-24 RX ORDER — FUROSEMIDE 10 MG/ML
40 INJECTION INTRAMUSCULAR; INTRAVENOUS ONCE
Status: COMPLETED | OUTPATIENT
Start: 2018-03-24 | End: 2018-03-24

## 2018-03-24 NOTE — OCCUPATIONAL THERAPY NOTE
OCCUPATIONAL THERAPY TREATMENT NOTE - INPATIENT     Room Number: 0314/1408-L  Session: 1   Number of Visits to Meet Established Goals: 5    Presenting Problem: CABG    History related to current admission: Pt had cardiac cath and is s/p CABG.        Problem Comment: as a child      OBJECTIVE  Precautions: Sternal;Cardiac    WEIGHT BEARING RESTRICTION  Weight Bearing Restriction: None                PAIN ASSESSMENT  Ratin  Location: Sternal  Management Techniques:  Activity promotion     ACTIVITY TOLERANCE CHEST EXERCISES    Trunk Rotation    Elbow Circles    Chest Fly's     Scapular Retraction          Patient End of Session: Up in chair;Needs met;Call light within reach; All patient questions and concerns addressed    ASSESSMENT   Pt demonstrated impr

## 2018-03-24 NOTE — CONSULTS
ENDOCRINOLOGY CONSULTATION    Attending physician:  Gal Tatum MD  Consulting physican:  Rosas Dotson MD    Admission Date:  3/22/2018  Consultation Date:  3/23/2018    Reason for consultation: Mgmt of borderline DM    Chief Complaint:  Admitted 3/22/2018: CABG x 2 (LIMA to LAD and SVG to RCA)   • Seasonal allergies    • Social anxiety disorder 5/3/2013   • Visual impairment     glasses         Past Surgical History:  No date: CABG      Comment: On 3/22/2018: CABG x 2 (LIMA to LAD and SVG to tablet Oral Q15 Min PRN   Or      dextrose 50% injection 50 mL 50 mL Intravenous Q15 Min PRN   Or      glucose (DEX4) oral liquid 30 g 30 g Oral Q15 Min PRN   Or      Glucose-Vitamin C (DEX-4) 4-0.006 g chewable tab 8 tablet 8 tablet Oral Q15 Min PRN   [CO suppository 300 mg 300 mg Rectal Once   Or      [COMPLETED] aspirin tab 325 mg 325 mg Per NG Tube Once   aspirin EC EC tab 325 mg 325 mg Oral Daily   Or      aspirin 300 MG rectal suppository 300 mg 300 mg Rectal Daily   metoprolol Tartrate (LOPRESSOR) tab injection 1 mg 1 mg Intravenous Q30 Min PRN           Allergies:    Clindamycin             Diarrhea    Comment:Rectal bleeding        Social History    Marital status:             Spouse name:                       Years of education: CHOLESTEROL, TOTAL      <200 mg/dL 251 (H)   Triglycerides      <150 mg/dL 210 (H)   HDL Cholesterol      >45 mg/dL 31 (L)   LDL Cholesterol Calc      <130 mg/dL 178 (H)         Component      Latest Ref Rng & Units 2/13/2018   TSH      0.350 - 5.500 mIU

## 2018-03-24 NOTE — PHYSICAL THERAPY NOTE
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    Ashanti Kahn baseline. Recommend HHPT upon [facility] d/c.   *Per MD Lira protocol - [facility] upon discharge from [facility].     Will f/u 3/26/18 for stair training - Pt in agreement with POC.     DISCHARGE RECOMMENDATIONS  PT Discharg

## 2018-03-24 NOTE — PROGRESS NOTES
[facility]   [facility] Progress Note    Jodiblanka Orellana Patient Status:  Inpatient    1960 MRN [id number]   [facility] 6NE-A Attending Analy Mehta MD   Hosp Day # 2 PCP Jannet Pinto DO     Subjective:  Denies complaints    Objective: HYDROcodone-acetaminophen (NORCO)  MG per tab 1 tablet 1 tablet Oral Q4H PRN   Or      HYDROcodone-acetaminophen (NORCO)  MG per tab 2 tablet 2 tablet Oral Q4H PRN   morphINE sulfate (PF) 4 MG/ML injection 2 mg 2 mg Intravenous Q1H PRN   Or mupirocin (BACTROBAN) 2% nasal ointment OINT 1 Application 1 Application Nasal BID   ondansetron HCl (ZOFRAN) injection 4 mg 4 mg Intravenous Q6H PRN   Naloxone HCl (NARCAN) 0.4 MG/ML injection 0.08 mg 0.08 mg Intravenous Q5 Min PRN   DiphenhydrAMINE HCl

## 2018-03-24 NOTE — PLAN OF CARE
Assumed care of this patient at [address] [place] [address]. S/p CABG x 2 POD 1. Patient is AOx4, neurologically intact. Clear and diminished to auscultation. Achieving 500 on the IS. Nasal canula worn overnight, saturating 97%.  Normal sinus rhythm on the monitor-ST elevation pr

## 2018-03-24 NOTE — PROGRESS NOTES
[facility]IOLOGY  Progress Note    Kirti Jenkins Patient Status:  Inpatient    1960 MRN [id number]   [facility] 6NE-A Attending Penelope Garrett MD   Hosp Day # 2 PCP Cecille Hinojosa DO     Subjective:  Patient seen and examined.   Chart r 03/23/2018   RDW 13.7 03/23/2018   .0 03/23/2018       Lab Results  Component Value Date   INR 0.99 03/12/2018       Medications:    Current Facility-Administered Medications:  Metoprolol Tartrate (LOPRESSOR) tab 50 mg 50 mg Oral Daily Beta Blocker Continuous PRN   Nitroprusside Sodium (NIPRIDE) 50 mg in dextrose 5 % 250 mL infusion 0.1-4 mcg/kg/min Intravenous Continuous PRN   aspirin EC EC tab 325 mg 325 mg Oral Daily   Or      aspirin 300 MG rectal suppository 300 mg 300 mg Rectal Daily   docusate Diverticulitis     Ulcerative colitis ([facility] [address]     Anxiety     History of adenomatous polyp of colon     S/P CABG (coronary artery bypass graft)     Borderline diabetes     S/P CABG x 2      Seen and examined. Chart reviewed.   Status post coronary artery byp

## 2018-03-24 NOTE — PROGRESS NOTES
ENDOCRINOLOGY PROGRESS NOTE    Typed by Kamila York MD on 3/24/2018      S:  Pt sitting up in the chair. Feels well and anticipates going to stepdown floor today.       O:  BP 97/48   Pulse 68   Temp 97.9 °F (36.6 °C) (Temporal)   Resp 24   Ht -American      >=60 115   CALCIUM      8.3 - 10.3 mg/dL 7.8 (L)   Sodium      136 - 144 mmol/L 144   Potassium      3.6 - 5.1 mmol/L 4.1   Chloride      101 - 111 mmol/L 113 (H)   Carbon Dioxide, Total      22.0 - 32.0 mmol/L 23.0       Component

## 2018-03-25 LAB
BLOOD TYPE BARCODE: 5100
GLUCOSE BLD-MCNC: 100 MG/DL (ref 65–99)
GLUCOSE BLD-MCNC: 93 MG/DL (ref 65–99)

## 2018-03-25 PROCEDURE — 86920 COMPATIBILITY TEST SPIN: CPT

## 2018-03-25 PROCEDURE — 82962 GLUCOSE BLOOD TEST: CPT

## 2018-03-25 NOTE — PROGRESS NOTES
[facility] Cardiology Progress Note    Subjective:  Still with occasional cough. Already took 2 walks in [place]. Still feels a bit puffy.      Objective:  /57 (BP Location: Left arm)   Pulse 98   Temp 98 °F (36.7 °C) (Oral)   Resp 21   Ht 157.5 cm (5' 2\")

## 2018-03-25 NOTE — HOME CARE LIAISON
MET WITH PTNT TO DISCUSS HOME HEALTH SERVICES AND COVERAGE CRITERIA. PTNT AGREEABLE TO Jhonny Khanna. PTNT GIVEN RESIDENTIAL BROCHURE. RESIDENTIAL WITH PROVIDE SN/PT ON DISCHARGE.     Thank you for this referral,   Shane Rivera

## 2018-03-25 NOTE — PROGRESS NOTES
[facility]   [facility] Progress Note    Kyle Res Patient Status:  Inpatient    1960 MRN [id number]   [facility] 8NE-A Attending Sunshine Mcgee MD   Hosp Day # 3 PCP Cecille Hinojosa DO     Subjective:  Doing well    Objective:  BP 10 morphINE sulfate (PF) 4 MG/ML injection 2 mg 2 mg Intravenous Q1H PRN   Or      morphINE sulfate (PF) 4 MG/ML injection 4 mg 4 mg Intravenous Q1H PRN   Or      morphINE sulfate (PF) 4 MG/ML injection 8 mg 8 mg Intravenous Q1H PRN   temazepam (RESTORIL) c Problem List:     Social anxiety disorder     Hypertension     Hypercholesteremia     Fibrocystic breast disease     Acute colitis     Diverticulosis     Diverticulitis     Ulcerative colitis ([facility] [address]     Anxiety     History of adenomatous polyp of colon

## 2018-03-25 NOTE — PROGRESS NOTES
03/25/18 1329   Clinical Encounter Type   Visited With Health care provider;Patient   Routine Visit Introduction   Continue Visiting No   Patient Spiritual Encounters   Spiritual Assessment Completed 2    visited with patient to discuss Advance

## 2018-03-25 NOTE — PROGRESS NOTES
ENDOCRINOLOGY PROGRESS NOTE    Typed by Gabriel Cancino MD on 3/25/2018      S:  Pt sitting up at the side of the bed - feels well and is in good spirits. Family at bedside.       O: BP 96/57 (BP Location: Left arm)   Pulse 74   Temp 98.4 °F (36.9 >=60 100   GFR, -American      >=60 115   CALCIUM      8.3 - 10.3 mg/dL 7.8 (L)   Sodium      136 - 144 mmol/L 144   Potassium      3.6 - 5.1 mmol/L 4.1   Chloride      101 - 111 mmol/L 113 (H)   Carbon Dioxide, Total      22.0 - 32.0 mmol/L 23.0

## 2018-03-25 NOTE — PLAN OF CARE
A/O X3 DENIES CHEST PAIN, SOB, LIGHTHEADEDNESS, DIZZINESS. Up and walking around independently, sitting in chair. c/o minimal pain and only with movement. Denies needing pain medications at this time. .   Betadine this am. Mid sternla and L leg (

## 2018-03-26 VITALS
HEIGHT: 62 IN | RESPIRATION RATE: 18 BRPM | SYSTOLIC BLOOD PRESSURE: 100 MMHG | OXYGEN SATURATION: 92 % | WEIGHT: 179.69 LBS | BODY MASS INDEX: 33.07 KG/M2 | DIASTOLIC BLOOD PRESSURE: 52 MMHG | TEMPERATURE: 98 F | HEART RATE: 69 BPM

## 2018-03-26 PROCEDURE — 86920 COMPATIBILITY TEST SPIN: CPT

## 2018-03-26 PROCEDURE — 97110 THERAPEUTIC EXERCISES: CPT

## 2018-03-26 PROCEDURE — 97116 GAIT TRAINING THERAPY: CPT

## 2018-03-26 RX ORDER — FUROSEMIDE 20 MG/1
20 TABLET ORAL DAILY
Qty: 14 TABLET | Refills: 0 | Status: SHIPPED | OUTPATIENT
Start: 2018-03-27 | End: 2018-06-26 | Stop reason: ALTCHOICE

## 2018-03-26 RX ORDER — HYDROCODONE BITARTRATE AND ACETAMINOPHEN 5; 325 MG/1; MG/1
1-2 TABLET ORAL
Qty: 60 TABLET | Refills: 0 | Status: SHIPPED | OUTPATIENT
Start: 2018-03-26 | End: 2018-06-26 | Stop reason: ALTCHOICE

## 2018-03-26 RX ORDER — METOPROLOL SUCCINATE 25 MG/1
25 TABLET, EXTENDED RELEASE ORAL DAILY
Qty: 30 TABLET | Refills: 5 | Status: SHIPPED | OUTPATIENT
Start: 2018-03-26 | End: 2018-11-02 | Stop reason: DRUGHIGH

## 2018-03-26 NOTE — PROGRESS NOTES
Discharge instructions thoroughly reviewed with pt including all medications and dosages. Post open heart home care instructions reviewed. Verbalizes understanding. Taken down via wc.

## 2018-03-26 NOTE — CM/SW NOTE
Informed by liaevin bañuelos with [facility], insurance not contracted for hhc with [facility]--needs to be in network with [facility]--await acceptance agency for hhc made by [facility]

## 2018-03-26 NOTE — HOME CARE LIAISON
[facility] is not contracted with this patient's insurance. Attempting to re-refer this patient.    Radha Matters informed

## 2018-03-26 NOTE — PHYSICAL THERAPY NOTE
PHYSICAL THERAPY TREATMENT NOTE - INPATIENT    Room Number: 6837/6163-I     Session: 2  Number of Visits to Meet Established Goals: 1    Presenting Problem: s/p CABG 3/22/18     HOME SITUATION  Type of Home: House   Home Layout: One level  Stairs to [employer] Surgeon: Nickolas Recinos MD;  Location: [facility] ENDOSCOPY  11/19/2013: COLONOSCOPY WITH BIOPSY  03/2016: SHAMIKA BIOPSY STEREOTACTIC NODULE 1 SITE RIGHT      Comment: Atypical hyperplasia no surgery performed  1980: ORAL SURGERY PROCEDURE      Co tested  Comment : above scores based on dept protocol    Skilled Therapy Provided:     Pt participated in Yajaira Freitas with good tolerance. Verbal review Therex as listed below. Mobility as listed above.        Exercises Repetitions   UPPER EXTREMI Patient is able to demonstrate transfers Sit to/from Stand at assistance level: independent - met 3/26/2018       Goal #3 Patient is able to ambulate 300 feet with assist device: none at assistance level: independent - met 3/26/2018       Goal #4 Pt will r

## 2018-03-26 NOTE — PLAN OF CARE
Assumed care this am. Pt is up in chair, christofer grissom on, minimal generalized edma. Lungs CTA. No SOB. Incisions WNL, betadine. Pacer wires discontinued per CV surgery orders. Began DC planning and education with pt. Discussed emotional components.  Pt thinks s functional ability and stability  - Promote increasing activity/tolerance for mobility and gait  - Educate and engage patient/family in tolerated activity level and precautions     Outcome: Adequate for Discharge      Problem: Impaired Activities of Daily integrity  - Assess and document dressing/incision, wound bed, drain sites and surrounding tissue  - Implement wound care per orders  - Initiate isolation precautions as appropriate  - Initiate Pressure Ulcer prevention bundle as indicated   Outcome: Adequ

## 2018-03-26 NOTE — PROGRESS NOTES
[facility]  Progress Note    [address] [facility] Patient Status:  Inpatient    1960 MRN [id number]   [facility] 8NE-A Attending Marc Estrada MD   Hosp Day # 4 PCP Cecille Hinojosa DO     Subjective:  Pt feeling well today - getting better

## 2018-03-26 NOTE — HOME CARE LIAISON
Patient has been accepted by [facility] --- phone [phone] and fax [phone]; contact Janel Rinne

## 2018-03-26 NOTE — PROGRESS NOTES
[facility]  Cardiology Progress Note    Scott Jorden Patient Status:  Inpatient    1960 MRN [id number]   [facility] 8NE-A Attending Sonny Santana MD   Hosp Day # 4 PCP Mikie Burnette DO     Subjective:  Ready to home.  Denies chest denied her PCSK9 antibody in the past, We will appeal. To see me in clinic in 1-2 weeks. Home today.   Jim Fairbanks MD

## 2018-03-27 ENCOUNTER — PRIOR ORIGINAL RECORDS (OUTPATIENT)
Dept: OTHER | Age: 58
End: 2018-03-27

## 2018-03-27 ENCOUNTER — PATIENT OUTREACH (OUTPATIENT)
Dept: CASE MANAGEMENT | Age: 58
End: 2018-03-27

## 2018-03-27 ENCOUNTER — PATIENT MESSAGE (OUTPATIENT)
Dept: CASE MANAGEMENT | Age: 58
End: 2018-03-27

## 2018-03-27 DIAGNOSIS — Z02.9 ENCOUNTERS FOR ADMINISTRATIVE PURPOSE: ICD-10-CM

## 2018-03-27 NOTE — PROGRESS NOTES
LM for pt to call NCM back for condition update. [employer] Lodge[employer] message sent to the pt for condition update.

## 2018-03-27 NOTE — PAYOR COMM NOTE
CONTINUED STAY REVIEW    Payor: [address]  Subscriber #:  [id number]  Authorization Number: [id number]    Admit date: 3/22/18  Admit time: [address]    Admitting Physician: Penelope Garrett MD  Attending Physician:  No att. providers found  Nohelia Roberts [employer] [place]  3) Encourage IS  4) Pain management  5) PT/OT  6) HD Stable  7) Fluid over     Thomson MD Ernesto Physician Signed Cardiology Progress Notes   Date of Service: 3/24/2018    Subjective:  Patient seen and examined. Chart reviewed.   Status p Payor:  [address] [place] [address]  Subscriber #:  [id number]  Authorization Number: [id number]    Admit date: 3/22/18  Admit time:  [address]  Discharge Date: 3/26/2018  3:26 PM     Admitting Physician: Bouchra Woodall MD  Attending Physician:  Elayne att. providers advance for your consideration.

## 2018-03-28 ENCOUNTER — TELEPHONE (OUTPATIENT)
Dept: FAMILY MEDICINE CLINIC | Facility: CLINIC | Age: 58
End: 2018-03-28

## 2018-03-28 RX ORDER — ACETAMINOPHEN 500 MG
500 TABLET ORAL EVERY 6 HOURS PRN
COMMUNITY
End: 2018-05-02

## 2018-03-28 RX ORDER — ROSUVASTATIN CALCIUM 40 MG/1
40 TABLET, COATED ORAL NIGHTLY
COMMUNITY
End: 2018-04-19 | Stop reason: SDUPTHER

## 2018-03-28 RX ORDER — FAMOTIDINE 20 MG/1
20 TABLET ORAL 2 TIMES DAILY
COMMUNITY
End: 2018-07-05

## 2018-03-28 NOTE — PROGRESS NOTES
Initial Post Discharge Follow Up   Discharge Date: 3/26/18  Contact Date: 3/28/2018    Consent Verification:  Assessment Completed With: Patient  HIPAA Verified?   Yes    Discharge Dx:   Coronary artery disease, dyslipidemia, s/p CABG x2     General:   • limitations due to surgery  • Were you given a specific diet to follow at discharge? no      Medications: Reviewed medication list with the patient. Medications are up to date.      Current Outpatient Prescriptions:  HYDROcodone-acetaminophen (NORCO) 5-32 started these services? no- set to start 4/26/18    DX: specifics:  Please enter following SmartPhrase as applicable:  AMI:  .TCMAMITEMP  CHF:  .TCMCHFTEMP  COPD: .TCMCOPDTEMP  CVA: .TCMCVATEMP  CV Surgery:  . TCMCVSURGERY  Pneumonia: . TCMPNEUMONIATEMP  Ort & Time Appointment Department [facility])    Apr 18, 2018 11:30 AM CDT Post Op with [facility], [facility])    Apr 26, 2018  2:30 PM CDT CARDIAC REHAB PHASE II INITIAL with [facility] CARDIAC REHAB ROOM 1 Ed insurance was approved today therefore an RN will be set to see her today or tomorrow. An RN is being scheduled now. Dania stated the pt will be contacted with an update.        [x]  Discharge Summary, Discharge medications reviewed/discussed/and reconci

## 2018-03-28 NOTE — TELEPHONE ENCOUNTER
Contacted the pt for TCM. Pt is scheduled for HFU with PCP on 4/2/18. Pt did have a medication question during TCM call. Pt stated she was taking Pepcid 20 BID while in the hospital but was not advised to continue after d/c.  Pt stated she has resumed the m

## 2018-03-28 NOTE — TELEPHONE ENCOUNTER
No NSAIDS. Can take OTC Tylenol if needed for pain. Norco if severe pain. Selena Sorensen for [employer]. If any further rectal bleeding to ER.

## 2018-04-02 ENCOUNTER — OFFICE VISIT (OUTPATIENT)
Dept: FAMILY MEDICINE CLINIC | Facility: CLINIC | Age: 58
End: 2018-04-02

## 2018-04-02 ENCOUNTER — PRIOR ORIGINAL RECORDS (OUTPATIENT)
Dept: OTHER | Age: 58
End: 2018-04-02

## 2018-04-02 ENCOUNTER — APPOINTMENT (OUTPATIENT)
Dept: LAB | Facility: HOSPITAL | Age: 58
End: 2018-04-02
Attending: FAMILY MEDICINE
Payer: COMMERCIAL

## 2018-04-02 VITALS
DIASTOLIC BLOOD PRESSURE: 60 MMHG | TEMPERATURE: 97 F | HEIGHT: 62 IN | WEIGHT: 172 LBS | OXYGEN SATURATION: 98 % | HEART RATE: 82 BPM | RESPIRATION RATE: 16 BRPM | BODY MASS INDEX: 31.65 KG/M2 | SYSTOLIC BLOOD PRESSURE: 100 MMHG

## 2018-04-02 DIAGNOSIS — D64.89 ANEMIA DUE TO OTHER CAUSE, NOT CLASSIFIED: ICD-10-CM

## 2018-04-02 DIAGNOSIS — R73.03 BORDERLINE DIABETES: ICD-10-CM

## 2018-04-02 DIAGNOSIS — I10 ESSENTIAL HYPERTENSION: ICD-10-CM

## 2018-04-02 DIAGNOSIS — F40.10 SOCIAL ANXIETY DISORDER: ICD-10-CM

## 2018-04-02 DIAGNOSIS — K51.00 ULCERATIVE PANCOLITIS WITHOUT COMPLICATION (HCC): ICD-10-CM

## 2018-04-02 DIAGNOSIS — K62.5 RECTAL BLEEDING: ICD-10-CM

## 2018-04-02 DIAGNOSIS — Z95.1 S/P CABG X 2: ICD-10-CM

## 2018-04-02 DIAGNOSIS — Z09 HOSPITAL DISCHARGE FOLLOW-UP: Primary | ICD-10-CM

## 2018-04-02 DIAGNOSIS — R05.3 CHRONIC COUGH: ICD-10-CM

## 2018-04-02 DIAGNOSIS — E78.00 HYPERCHOLESTEREMIA: ICD-10-CM

## 2018-04-02 PROCEDURE — 85025 COMPLETE CBC W/AUTO DIFF WBC: CPT | Performed by: FAMILY MEDICINE

## 2018-04-02 PROCEDURE — 82306 VITAMIN D 25 HYDROXY: CPT | Performed by: FAMILY MEDICINE

## 2018-04-02 PROCEDURE — 83540 ASSAY OF IRON: CPT | Performed by: FAMILY MEDICINE

## 2018-04-02 PROCEDURE — 99496 TRANSJ CARE MGMT HIGH F2F 7D: CPT | Performed by: FAMILY MEDICINE

## 2018-04-02 PROCEDURE — 82728 ASSAY OF FERRITIN: CPT | Performed by: FAMILY MEDICINE

## 2018-04-02 PROCEDURE — 83550 IRON BINDING TEST: CPT | Performed by: FAMILY MEDICINE

## 2018-04-02 PROCEDURE — 80053 COMPREHEN METABOLIC PANEL: CPT | Performed by: FAMILY MEDICINE

## 2018-04-02 RX ORDER — ALPRAZOLAM 0.25 MG/1
0.25 TABLET ORAL 2 TIMES DAILY PRN
Qty: 20 TABLET | Refills: 0 | Status: SHIPPED | OUTPATIENT
Start: 2018-04-02 | End: 2018-06-26

## 2018-04-02 RX ORDER — HYDROCORTISONE 100 MG/60ML
100 SUSPENSION RECTAL NIGHTLY
Qty: 7 ENEMA | Refills: 1 | Status: SHIPPED | OUTPATIENT
Start: 2018-04-02 | End: 2018-06-26 | Stop reason: ALTCHOICE

## 2018-04-02 NOTE — DISCHARGE SUMMARY
[facility]  Discharge Summary    [facility] Patient Status:  Inpatient    1960 MRN [id number]   [facility] 8NE-A Attending No att. providers found   Hosp Day # 4 PCP Cecille Hinojosa DO     Admit date: 3/22/2018    Discharge date

## 2018-04-02 NOTE — PROGRESS NOTES
HPI:    Katy Green is a 60year old female here today for hospital follow up.    She was discharged from Inpatient hospital, [facility] to Home   Admission Date: 3/22/18   Discharge Date: 3/26/18  Hospital Discharge Diagnoses (since 3/3/2018)   N sure if it changed her cough. She was on it through a bad time through her job and got off it just fine. Patient has a history of colitis.   She has seen Dr. Vernon Blevins in the past and he has given her hydrocortisone enemas which has helped significantly with (VITAMIN D) 1000 units Oral Tab Take 1 capsule by mouth daily. Krill Oil 1000 MG Oral Cap Take 1 capsule by mouth daily. No current facility-administered medications on file prior to visit.        HISTORY: reconciled and reviewed with patient  She  ha denies heartburn, denies diarrhea; hx of and current rectal bleeding; UC  MUSCULOSKELETAL: denies pain, normal range of motion of extremities  NEURO: denies headaches, denies dizziness, denies weakness  PSYCHE: denies depression; anxiety  HEMATOLOGIC:  hx ellie  -     CBC WITH DIFFERENTIAL WITH PLATELET  -     IRON AND TIBC  -     FERRITIN  -     COMP METABOLIC PANEL (14)  -     CBC W/ DIFFERENTIAL    Borderline diabetes  -- recent A1c is 6.0    Ulcerative pancolitis without complication (HCC)  -     CBC WITH severe  · Amount and/or Complexity of Data to Be Reviewed: severe  · Risk of Significant Complications, Morbidity, and/or Mortality: severe    Overall Risk:   severe    Patient seen within 7 days from date of discharge.      William Beatty DO, 4/2/2018

## 2018-04-02 NOTE — H&P
Mrs. Surjit Chadwick is a super nice 70-year-old female patient who has had known severe familial dyslipidemia since at least age 23. She had up until a year ago been managed very successfully with Crestor and Zetia.   For reasons that baffle understanding, her ins Physical examination shows an alert female in no distress. Pupils are equal and round. Carotid pulses are equal bilaterally. There are no bruits. The lungs are clear without rales, rhonchi, or wheezing. The spine is straight and in the midline.   [place]

## 2018-04-03 DIAGNOSIS — E87.6 HYPOKALEMIA: ICD-10-CM

## 2018-04-03 DIAGNOSIS — E55.9 VITAMIN D DEFICIENCY: ICD-10-CM

## 2018-04-03 DIAGNOSIS — D50.9 IRON DEFICIENCY ANEMIA, UNSPECIFIED IRON DEFICIENCY ANEMIA TYPE: Primary | ICD-10-CM

## 2018-04-03 RX ORDER — ERGOCALCIFEROL 1.25 MG/1
50000 CAPSULE ORAL WEEKLY
Qty: 8 CAPSULE | Refills: 0 | Status: SHIPPED | OUTPATIENT
Start: 2018-04-03 | End: 2018-05-03

## 2018-04-06 ENCOUNTER — OFFICE VISIT (OUTPATIENT)
Dept: FAMILY MEDICINE CLINIC | Facility: CLINIC | Age: 58
End: 2018-04-06

## 2018-04-06 ENCOUNTER — TELEPHONE (OUTPATIENT)
Dept: FAMILY MEDICINE CLINIC | Facility: CLINIC | Age: 58
End: 2018-04-06

## 2018-04-06 VITALS
WEIGHT: 171 LBS | BODY MASS INDEX: 31.47 KG/M2 | HEIGHT: 62 IN | DIASTOLIC BLOOD PRESSURE: 70 MMHG | HEART RATE: 84 BPM | TEMPERATURE: 98 F | RESPIRATION RATE: 18 BRPM | SYSTOLIC BLOOD PRESSURE: 124 MMHG

## 2018-04-06 DIAGNOSIS — M79.89 SWELLING OF ARM: Primary | ICD-10-CM

## 2018-04-06 DIAGNOSIS — M79.602 PAIN OF LEFT UPPER EXTREMITY: ICD-10-CM

## 2018-04-06 PROCEDURE — 99213 OFFICE O/P EST LOW 20 MIN: CPT | Performed by: FAMILY MEDICINE

## 2018-04-06 NOTE — TELEPHONE ENCOUNTER
tues am had pain in hand at IV site. Home health nurse saw on weds, noted it was hard in one spot, told to use ice/heat, been doing that bu last night was hurting more, asking to be seen to see if any infection.   S/w Dr HOUSE, she agrees pt can come over now

## 2018-04-06 NOTE — TELEPHONE ENCOUNTER
Patient recently had open heart surgery. Calling today with pain in her left hand where her IVs were. Sight is red, had and pain seems to be increasing. Symptoms started 4/3/18.

## 2018-04-06 NOTE — PROGRESS NOTES
Cheri Her is a 60year old female. HPI:   Patient recently had open heart surgery and had an IV placed in the dorsal aspect of her distal left forearm. She feels that the area is more tender and swollen today.   She states that yesterday she noted mo only    Comment:Rectal bleeding   Past Medical History:   Diagnosis Date   • Acute colitis 11/19/2013    severe w/ulceration   • Borderline diabetes     Dx in 3/2018: HgA1C 6%   • Change in stool    • Colitis     gets irritated with antibiotics   • Colonic Potassium 3.4 (L) 3.6 - 5.1 mmol/L   Chloride 108 101 - 111 mmol/L   CO2 25.0 22.0 - 32.0 mmol/L   -CBC W/ DIFFERENTIAL   Result Value Ref Range   WBC 10.2 4.0 - 13.0 x10(3) uL   RBC 2.97 (L) 3.80 - 5.10 x10(6)uL   HGB 9.2 (L) 12.0 - 16.0 g/dL   HCT 29. 1 There is mildly more swelling in her dorsal aspect of her hand as into her fingers. ASSESSMENT AND PLAN:   Swelling of arm  (primary encounter diagnosis)  Pain of left upper extremity    No orders of the defined types were placed in this encounter.

## 2018-04-09 ENCOUNTER — HOSPITAL ENCOUNTER (OUTPATIENT)
Dept: GENERAL RADIOLOGY | Age: 58
Discharge: HOME | End: 2018-04-09
Attending: THORACIC SURGERY (CARDIOTHORACIC VASCULAR SURGERY)
Payer: COMMERCIAL

## 2018-04-09 DIAGNOSIS — Z98.890 HISTORY OF OPEN HEART SURGERY: ICD-10-CM

## 2018-04-09 PROCEDURE — 71048 X-RAY EXAM CHEST 4+ VIEWS: CPT | Performed by: THORACIC SURGERY (CARDIOTHORACIC VASCULAR SURGERY)

## 2018-04-09 PROCEDURE — 71046 X-RAY EXAM CHEST 2 VIEWS: CPT | Performed by: THORACIC SURGERY (CARDIOTHORACIC VASCULAR SURGERY)

## 2018-04-10 ENCOUNTER — MYAURORA ACCOUNT LINK (OUTPATIENT)
Dept: OTHER | Age: 58
End: 2018-04-10

## 2018-04-10 ENCOUNTER — PRIOR ORIGINAL RECORDS (OUTPATIENT)
Dept: OTHER | Age: 58
End: 2018-04-10

## 2018-04-16 LAB
ALBUMIN: 3.2 G/DL
ALKALINE PHOSPHATATE(ALK PHOS): 94 IU/L
BILIRUBIN TOTAL: 0.9 MG/DL
BUN: 10 MG/DL
CALCIUM: 8.5 MG/DL
CHLORIDE: 108 MEQ/L
CREATININE, SERUM: 0.79 MG/DL
GLUCOSE: 106 MG/DL
HEMATOCRIT: 29.1 %
HEMOGLOBIN: 9.2 G/DL
IRON, TOTAL: 35 MCG/DL
PLATELETS: 475 K/UL
POTASSIUM, SERUM: 3.4 MEQ/L
PROTEIN, TOTAL: 7.1 G/DL
RED BLOOD COUNT: 2.97 X 10-6/U
SGOT (AST): 22 IU/L
SGPT (ALT): 20 IU/L
SODIUM: 142 MEQ/L
VITAMIN D 25-OH: 26.8 NG/ML
WHITE BLOOD COUNT: 10.2 X 10-3/U

## 2018-04-18 DIAGNOSIS — E78.00 HYPERCHOLESTEREMIA: ICD-10-CM

## 2018-04-19 RX ORDER — ROSUVASTATIN CALCIUM 40 MG/1
TABLET, COATED ORAL
Qty: 90 TABLET | Refills: 0 | Status: SHIPPED | OUTPATIENT
Start: 2018-04-19 | End: 2018-08-22

## 2018-04-19 RX ORDER — HYDROCHLOROTHIAZIDE 12.5 MG/1
CAPSULE, GELATIN COATED ORAL
Qty: 90 CAPSULE | Refills: 0 | Status: SHIPPED | OUTPATIENT
Start: 2018-04-19 | End: 2018-06-26 | Stop reason: ALTCHOICE

## 2018-04-19 RX ORDER — RAMIPRIL 10 MG/1
CAPSULE ORAL
Qty: 90 CAPSULE | Refills: 0 | Status: SHIPPED | OUTPATIENT
Start: 2018-04-19 | End: 2018-06-26

## 2018-04-19 NOTE — TELEPHONE ENCOUNTER
Dr. Diane Galvin saw after hospital discharge. Please verify with her that patient is to remain off all of these meds (looks like they were stopped at discharge?).

## 2018-04-24 ENCOUNTER — HOSPITAL ENCOUNTER (OUTPATIENT)
Dept: CV DIAGNOSTICS | Facility: HOSPITAL | Age: 58
Discharge: HOME | End: 2018-04-24
Attending: INTERNAL MEDICINE
Payer: COMMERCIAL

## 2018-04-24 DIAGNOSIS — I25.10 CORONARY ARTERY DISEASE: ICD-10-CM

## 2018-04-24 PROCEDURE — 93018 CV STRESS TEST I&R ONLY: CPT | Performed by: INTERNAL MEDICINE

## 2018-04-24 PROCEDURE — 93017 CV STRESS TEST TRACING ONLY: CPT | Performed by: INTERNAL MEDICINE

## 2018-04-26 ENCOUNTER — APPOINTMENT (OUTPATIENT)
Dept: CARDIAC REHAB | Facility: HOSPITAL | Age: 58
End: 2018-04-26
Attending: INTERNAL MEDICINE
Payer: COMMERCIAL

## 2018-04-26 ENCOUNTER — PRIOR ORIGINAL RECORDS (OUTPATIENT)
Dept: OTHER | Age: 58
End: 2018-04-26

## 2018-04-27 ENCOUNTER — PRIOR ORIGINAL RECORDS (OUTPATIENT)
Dept: OTHER | Age: 58
End: 2018-04-27

## 2018-05-02 ENCOUNTER — CARDPULM VISIT (OUTPATIENT)
Dept: CARDIAC REHAB | Facility: HOSPITAL | Age: 58
End: 2018-05-02
Attending: FAMILY MEDICINE
Payer: COMMERCIAL

## 2018-05-02 VITALS
OXYGEN SATURATION: 97 % | WEIGHT: 165.81 LBS | BODY MASS INDEX: 30.51 KG/M2 | HEIGHT: 62 IN | SYSTOLIC BLOOD PRESSURE: 120 MMHG | DIASTOLIC BLOOD PRESSURE: 80 MMHG | HEART RATE: 84 BPM

## 2018-05-07 ENCOUNTER — CARDPULM VISIT (OUTPATIENT)
Dept: CARDIAC REHAB | Facility: HOSPITAL | Age: 58
End: 2018-05-07
Attending: INTERNAL MEDICINE
Payer: COMMERCIAL

## 2018-05-07 PROCEDURE — 93798 PHYS/QHP OP CAR RHAB W/ECG: CPT

## 2018-05-09 ENCOUNTER — CARDPULM VISIT (OUTPATIENT)
Dept: CARDIAC REHAB | Facility: HOSPITAL | Age: 58
End: 2018-05-09
Attending: INTERNAL MEDICINE
Payer: COMMERCIAL

## 2018-05-09 PROCEDURE — 93798 PHYS/QHP OP CAR RHAB W/ECG: CPT

## 2018-05-11 ENCOUNTER — CARDPULM VISIT (OUTPATIENT)
Dept: CARDIAC REHAB | Facility: HOSPITAL | Age: 58
End: 2018-05-11
Attending: INTERNAL MEDICINE
Payer: COMMERCIAL

## 2018-05-11 PROCEDURE — 93798 PHYS/QHP OP CAR RHAB W/ECG: CPT

## 2018-05-14 ENCOUNTER — CARDPULM VISIT (OUTPATIENT)
Dept: CARDIAC REHAB | Facility: HOSPITAL | Age: 58
End: 2018-05-14
Attending: INTERNAL MEDICINE
Payer: COMMERCIAL

## 2018-05-14 PROCEDURE — 93798 PHYS/QHP OP CAR RHAB W/ECG: CPT

## 2018-05-16 ENCOUNTER — APPOINTMENT (OUTPATIENT)
Dept: CARDIAC REHAB | Facility: HOSPITAL | Age: 58
End: 2018-05-16
Attending: INTERNAL MEDICINE
Payer: COMMERCIAL

## 2018-05-17 ENCOUNTER — CARDPULM VISIT (OUTPATIENT)
Dept: CARDIAC REHAB | Facility: HOSPITAL | Age: 58
End: 2018-05-17
Attending: INTERNAL MEDICINE
Payer: COMMERCIAL

## 2018-05-17 PROCEDURE — 93798 PHYS/QHP OP CAR RHAB W/ECG: CPT

## 2018-05-18 ENCOUNTER — CARDPULM VISIT (OUTPATIENT)
Dept: CARDIAC REHAB | Facility: HOSPITAL | Age: 58
End: 2018-05-18
Attending: INTERNAL MEDICINE
Payer: COMMERCIAL

## 2018-05-18 PROCEDURE — 93798 PHYS/QHP OP CAR RHAB W/ECG: CPT

## 2018-05-21 ENCOUNTER — CARDPULM VISIT (OUTPATIENT)
Dept: CARDIAC REHAB | Facility: HOSPITAL | Age: 58
End: 2018-05-21
Attending: INTERNAL MEDICINE
Payer: COMMERCIAL

## 2018-05-21 PROCEDURE — 93798 PHYS/QHP OP CAR RHAB W/ECG: CPT

## 2018-05-23 ENCOUNTER — CARDPULM VISIT (OUTPATIENT)
Dept: CARDIAC REHAB | Facility: HOSPITAL | Age: 58
End: 2018-05-23
Attending: INTERNAL MEDICINE
Payer: COMMERCIAL

## 2018-05-23 PROCEDURE — 93798 PHYS/QHP OP CAR RHAB W/ECG: CPT

## 2018-05-25 ENCOUNTER — APPOINTMENT (OUTPATIENT)
Dept: CARDIAC REHAB | Facility: HOSPITAL | Age: 58
End: 2018-05-25
Attending: INTERNAL MEDICINE
Payer: COMMERCIAL

## 2018-05-25 ENCOUNTER — TELEPHONE (OUTPATIENT)
Dept: FAMILY MEDICINE CLINIC | Facility: CLINIC | Age: 58
End: 2018-05-25

## 2018-05-25 NOTE — TELEPHONE ENCOUNTER
Pt walked into our office this afternoon with a letter from [facility] requesting for Thurmond Pesa Dressler's help to get her test (US Carotid Doppler 1/27/18) authorized with her insurance; pls advise - pt @[phone] .   Per pt, she has

## 2018-05-28 ENCOUNTER — APPOINTMENT (OUTPATIENT)
Dept: CARDIAC REHAB | Facility: HOSPITAL | Age: 58
End: 2018-05-28
Attending: INTERNAL MEDICINE
Payer: COMMERCIAL

## 2018-05-29 RX ORDER — ERGOCALCIFEROL 1.25 MG/1
CAPSULE ORAL
Qty: 8 CAPSULE | Refills: 0 | OUTPATIENT
Start: 2018-05-29

## 2018-05-29 NOTE — TELEPHONE ENCOUNTER
Looks like this letter was sent in March which I didn't realize. Did they send a response to her regarding this? If so, does she have a copy of the letter?

## 2018-05-29 NOTE — TELEPHONE ENCOUNTER
The letter she received from [facility] states that she called on 1/25/18 and discussed CPT codes that appear to be for ultrasounds of the extremities - not the carotids.   [place] (the [employer] who handles the prior auths) is requesting medical records so the

## 2018-05-30 ENCOUNTER — CARDPULM VISIT (OUTPATIENT)
Dept: CARDIAC REHAB | Facility: HOSPITAL | Age: 58
End: 2018-05-30
Attending: INTERNAL MEDICINE
Payer: COMMERCIAL

## 2018-05-30 PROCEDURE — 93798 PHYS/QHP OP CAR RHAB W/ECG: CPT

## 2018-05-30 NOTE — TELEPHONE ENCOUNTER
Jimmy Gallo called pt and left a vmm for pt to contact our office and provide a copy of [employer] letter or their response regarding pt's test for the US Carotid Doppler. Thank you.

## 2018-06-01 ENCOUNTER — CARDPULM VISIT (OUTPATIENT)
Dept: CARDIAC REHAB | Facility: HOSPITAL | Age: 58
End: 2018-06-01
Attending: INTERNAL MEDICINE
Payer: COMMERCIAL

## 2018-06-01 PROCEDURE — 93798 PHYS/QHP OP CAR RHAB W/ECG: CPT

## 2018-06-04 ENCOUNTER — CARDPULM VISIT (OUTPATIENT)
Dept: CARDIAC REHAB | Facility: HOSPITAL | Age: 58
End: 2018-06-04
Attending: INTERNAL MEDICINE
Payer: COMMERCIAL

## 2018-06-04 PROCEDURE — 93798 PHYS/QHP OP CAR RHAB W/ECG: CPT

## 2018-06-06 ENCOUNTER — CARDPULM VISIT (OUTPATIENT)
Dept: CARDIAC REHAB | Facility: HOSPITAL | Age: 58
End: 2018-06-06
Attending: INTERNAL MEDICINE
Payer: COMMERCIAL

## 2018-06-06 PROCEDURE — 93798 PHYS/QHP OP CAR RHAB W/ECG: CPT

## 2018-06-07 NOTE — TELEPHONE ENCOUNTER
Pt walked into our office today and gave Pawel Saunders a copy of her 3/22/18 letter from [facility] referring to her call record of 1/25/18.   Pt's letter, a copy of pt's bill for $2,101.00 and other printouts from [employer] will be given to Mark Nieto for review in h

## 2018-06-08 ENCOUNTER — PRIOR ORIGINAL RECORDS (OUTPATIENT)
Dept: OTHER | Age: 58
End: 2018-06-08

## 2018-06-08 ENCOUNTER — CARDPULM VISIT (OUTPATIENT)
Dept: CARDIAC REHAB | Facility: HOSPITAL | Age: 58
End: 2018-06-08
Attending: INTERNAL MEDICINE
Payer: COMMERCIAL

## 2018-06-08 ENCOUNTER — LAB ENCOUNTER (OUTPATIENT)
Dept: LAB | Age: 58
End: 2018-06-08
Attending: INTERNAL MEDICINE
Payer: COMMERCIAL

## 2018-06-08 DIAGNOSIS — E78.01 FAMILIAL HYPERCHOLESTEROLEMIA: Primary | ICD-10-CM

## 2018-06-08 DIAGNOSIS — E66.9 OBESITY, UNSPECIFIED: ICD-10-CM

## 2018-06-08 DIAGNOSIS — I10 ESSENTIAL HYPERTENSION, MALIGNANT: ICD-10-CM

## 2018-06-08 PROCEDURE — 80061 LIPID PANEL: CPT

## 2018-06-08 PROCEDURE — 93798 PHYS/QHP OP CAR RHAB W/ECG: CPT

## 2018-06-08 PROCEDURE — 36415 COLL VENOUS BLD VENIPUNCTURE: CPT

## 2018-06-08 PROCEDURE — 80053 COMPREHEN METABOLIC PANEL: CPT

## 2018-06-11 ENCOUNTER — CARDPULM VISIT (OUTPATIENT)
Dept: CARDIAC REHAB | Facility: HOSPITAL | Age: 58
End: 2018-06-11
Attending: INTERNAL MEDICINE
Payer: COMMERCIAL

## 2018-06-11 PROCEDURE — 93798 PHYS/QHP OP CAR RHAB W/ECG: CPT

## 2018-06-12 ENCOUNTER — PRIOR ORIGINAL RECORDS (OUTPATIENT)
Dept: OTHER | Age: 58
End: 2018-06-12

## 2018-06-12 LAB
ALBUMIN: 3.7 G/DL
ALKALINE PHOSPHATATE(ALK PHOS): 83 IU/L
ALT (SGPT): 25 U/L
AST (SGOT): 31 U/L
BILIRUBIN TOTAL: 0.2 MG/DL
BUN: 10 MG/DL
CALCIUM: 9.1 MG/DL
CHLORIDE: 110 MEQ/L
CHOLESTEROL, TOTAL: 181 MG/DL
CREATININE, SERUM: 0.84 MG/DL
GLUCOSE: 100 MG/DL
GLUCOSE: 100 MG/DL
HDL CHOLESTEROL: 29 MG/DL
LDL CHOLESTEROL: 116 MG/DL
NON-HDL CHOLESTEROL: 152 MG/DL
POTASSIUM, SERUM: 4.2 MEQ/L
PROTEIN, TOTAL: 7.8 G/DL
SGOT (AST): 31 IU/L
SGPT (ALT): 25 IU/L
SODIUM: 143 MEQ/L
TRIGLYCERIDES: 179 MG/DL

## 2018-06-13 ENCOUNTER — CARDPULM VISIT (OUTPATIENT)
Dept: CARDIAC REHAB | Facility: HOSPITAL | Age: 58
End: 2018-06-13
Attending: INTERNAL MEDICINE
Payer: COMMERCIAL

## 2018-06-13 PROCEDURE — 93798 PHYS/QHP OP CAR RHAB W/ECG: CPT

## 2018-06-15 ENCOUNTER — CARDPULM VISIT (OUTPATIENT)
Dept: CARDIAC REHAB | Facility: HOSPITAL | Age: 58
End: 2018-06-15
Attending: INTERNAL MEDICINE
Payer: COMMERCIAL

## 2018-06-15 PROCEDURE — 93798 PHYS/QHP OP CAR RHAB W/ECG: CPT

## 2018-06-18 ENCOUNTER — CARDPULM VISIT (OUTPATIENT)
Dept: CARDIAC REHAB | Facility: HOSPITAL | Age: 58
End: 2018-06-18
Attending: INTERNAL MEDICINE
Payer: COMMERCIAL

## 2018-06-18 PROCEDURE — 93798 PHYS/QHP OP CAR RHAB W/ECG: CPT

## 2018-06-20 ENCOUNTER — CARDPULM VISIT (OUTPATIENT)
Dept: CARDIAC REHAB | Facility: HOSPITAL | Age: 58
End: 2018-06-20
Attending: INTERNAL MEDICINE
Payer: COMMERCIAL

## 2018-06-20 PROCEDURE — 93798 PHYS/QHP OP CAR RHAB W/ECG: CPT

## 2018-06-22 ENCOUNTER — CARDPULM VISIT (OUTPATIENT)
Dept: CARDIAC REHAB | Facility: HOSPITAL | Age: 58
End: 2018-06-22
Attending: INTERNAL MEDICINE
Payer: COMMERCIAL

## 2018-06-22 PROCEDURE — 93798 PHYS/QHP OP CAR RHAB W/ECG: CPT

## 2018-06-25 ENCOUNTER — CARDPULM VISIT (OUTPATIENT)
Dept: CARDIAC REHAB | Facility: HOSPITAL | Age: 58
End: 2018-06-25
Attending: INTERNAL MEDICINE
Payer: COMMERCIAL

## 2018-06-25 PROCEDURE — 93798 PHYS/QHP OP CAR RHAB W/ECG: CPT

## 2018-06-26 ENCOUNTER — APPOINTMENT (OUTPATIENT)
Dept: LAB | Facility: HOSPITAL | Age: 58
End: 2018-06-26
Attending: NURSE PRACTITIONER
Payer: COMMERCIAL

## 2018-06-26 ENCOUNTER — HOSPITAL ENCOUNTER (OUTPATIENT)
Dept: CT IMAGING | Facility: HOSPITAL | Age: 58
Discharge: HOME | End: 2018-06-26
Attending: NURSE PRACTITIONER
Payer: COMMERCIAL

## 2018-06-26 ENCOUNTER — OFFICE VISIT (OUTPATIENT)
Dept: FAMILY MEDICINE CLINIC | Facility: CLINIC | Age: 58
End: 2018-06-26

## 2018-06-26 VITALS
TEMPERATURE: 99 F | WEIGHT: 170 LBS | BODY MASS INDEX: 30.5 KG/M2 | RESPIRATION RATE: 16 BRPM | OXYGEN SATURATION: 98 % | SYSTOLIC BLOOD PRESSURE: 128 MMHG | DIASTOLIC BLOOD PRESSURE: 84 MMHG | HEART RATE: 80 BPM | HEIGHT: 62.5 IN

## 2018-06-26 DIAGNOSIS — R10.32 ABDOMINAL PAIN, LEFT LOWER QUADRANT: ICD-10-CM

## 2018-06-26 DIAGNOSIS — Z87.19 HISTORY OF DIVERTICULITIS: ICD-10-CM

## 2018-06-26 DIAGNOSIS — R82.90 ABNORMAL URINE: ICD-10-CM

## 2018-06-26 DIAGNOSIS — R10.32 ABDOMINAL PAIN, LEFT LOWER QUADRANT: Primary | ICD-10-CM

## 2018-06-26 PROCEDURE — 74177 CT ABD & PELVIS W/CONTRAST: CPT | Performed by: NURSE PRACTITIONER

## 2018-06-26 PROCEDURE — 99214 OFFICE O/P EST MOD 30 MIN: CPT | Performed by: NURSE PRACTITIONER

## 2018-06-26 PROCEDURE — 81001 URINALYSIS AUTO W/SCOPE: CPT | Performed by: NURSE PRACTITIONER

## 2018-06-26 PROCEDURE — 80053 COMPREHEN METABOLIC PANEL: CPT | Performed by: NURSE PRACTITIONER

## 2018-06-26 PROCEDURE — 85025 COMPLETE CBC W/AUTO DIFF WBC: CPT | Performed by: NURSE PRACTITIONER

## 2018-06-26 NOTE — PROGRESS NOTES
Liberty Hatchet is a 60year old female. HPI:   Patient presents today reporting pain to left lower quadrant for the past 1 week.  She does report a history of diverticulitis-she reports that she has \"flare ups\" 1-2 times per year and these resolve on the 11/19/2013   • Fibrocystic breast disease    • Fibroid uterus    • Gastrointestinal disorder     colitis   • High cholesterol    • High coronary artery calcium score    • Hyperlipidemia LDL goal <70    • Hypertension 5/3/2013   • Hypertension, essential, b left lower quadrant  (primary encounter diagnosis)  History of diverticulitis      Orders Placed This Encounter      Comp Metabolic Panel (14) [E]      CBC W Differential W Platelet [E]      UA/M With Culture Reflex [E]    Meds & Refills for this Visit:  N

## 2018-06-27 ENCOUNTER — TELEPHONE (OUTPATIENT)
Dept: FAMILY MEDICINE CLINIC | Facility: CLINIC | Age: 58
End: 2018-06-27

## 2018-06-27 ENCOUNTER — APPOINTMENT (OUTPATIENT)
Dept: CARDIAC REHAB | Facility: HOSPITAL | Age: 58
End: 2018-06-27
Attending: INTERNAL MEDICINE
Payer: COMMERCIAL

## 2018-06-27 NOTE — TELEPHONE ENCOUNTER
1340-annabelle/PSR sts pt called asking Pola Anthony to inform me that she is having an ultrasound done in dr [facility]arez[facility] this afternoon. Updated gordon espitia this info.

## 2018-06-27 NOTE — TELEPHONE ENCOUNTER
Pt called and asked to send her CT results from (6/26) to be sent to Dr. Dayanara Vasquez (oncology/gynecologist) in [facility]. Pt verbally gave me consent to do this. Results were faxed as a curtesy for the patient.  She was advised that if Dr. Rocyk Giordano

## 2018-06-27 NOTE — TELEPHONE ENCOUNTER
Call to [facility]ae [facility] (OB-GYN)-provided brief hx re pt's visit in our ofc yesterday as well as CT results. Advised pt called their ofc this morning/left messg req to speak w dr Darien Erickson.  Advised I am calling to provide additional info re re her car crying so she does not upset her son, who is in the house. Acknowledged pt's feelings and voiced understanding of her being overwhelmed.  Discussed it is perfectly normal for her to feel this way, but encouraged her to try to take one step at a time

## 2018-06-27 NOTE — TELEPHONE ENCOUNTER
Call from [facility] ths afternoon advising they have pt scheduled pt to see dr Jearlean Lefort tomorrow 10 am.   Asks where to send ofc visit notes tomorrow. Provided our ofc fax # and advised to send to [facility] attention.   [facility] voices brenda

## 2018-06-27 NOTE — TELEPHONE ENCOUNTER
Pt wanted Jeannine Barrett RN to know that she has an appt tomorrow with Dr. Coreen Dhillon, Oncologist.

## 2018-06-27 NOTE — TELEPHONE ENCOUNTER
At [facility] APN request, call to pt to see if she was able to speak w dr [facility] yet and if she obtained appt w dr Shar Downing. Pt sts had to leave a message for dr Boaz Mckeon.  sts dr [facility] of[facility] said they needed records faxed to them first-they will

## 2018-06-28 ENCOUNTER — TELEPHONE (OUTPATIENT)
Dept: FAMILY MEDICINE CLINIC | Facility: CLINIC | Age: 58
End: 2018-06-28

## 2018-06-28 NOTE — TELEPHONE ENCOUNTER
Patient called asking for [facility] RN. Wanted to give status that she saw her Gynecologist yesterday and they gave her a referral.      She cancelled her appointment with Dr. Gifty Ernandez and she wanted to discuss that with [facility].   Please advise at [phone]

## 2018-06-28 NOTE — TELEPHONE ENCOUNTER
Pt signed medical records request in office. Looking for documentation on the order placed for her Carotid Doppler done in January. Provided patient with letter written by Winnie Herbert and copy of telephone encounter for test from 1/9/18.  This is the

## 2018-06-28 NOTE — TELEPHONE ENCOUNTER
Pt has an appt on Monday at 9 am with Dr. Carolyn Hubbard in [facility]. Referred by Dr. Lee Borja and recommended highly. Pt wanted to make sure that Lashawn Tee was aware. Apologizes for how she acted yesterday. Was very shocked and upset.

## 2018-06-29 ENCOUNTER — APPOINTMENT (OUTPATIENT)
Dept: CARDIAC REHAB | Facility: HOSPITAL | Age: 58
End: 2018-06-29
Attending: INTERNAL MEDICINE
Payer: COMMERCIAL

## 2018-07-02 ENCOUNTER — CARDPULM VISIT (OUTPATIENT)
Dept: CARDIAC REHAB | Facility: HOSPITAL | Age: 58
End: 2018-07-02
Attending: INTERNAL MEDICINE
Payer: COMMERCIAL

## 2018-07-02 ENCOUNTER — PRIOR ORIGINAL RECORDS (OUTPATIENT)
Dept: OTHER | Age: 58
End: 2018-07-02

## 2018-07-02 ENCOUNTER — TELEPHONE (OUTPATIENT)
Dept: FAMILY MEDICINE CLINIC | Facility: CLINIC | Age: 58
End: 2018-07-02

## 2018-07-02 DIAGNOSIS — R10.32 LLQ ABDOMINAL PAIN: ICD-10-CM

## 2018-07-02 DIAGNOSIS — R93.5 ABNORMAL CT OF THE ABDOMEN: ICD-10-CM

## 2018-07-02 DIAGNOSIS — Z87.19 HISTORY OF DIVERTICULITIS: Primary | ICD-10-CM

## 2018-07-02 PROCEDURE — 93798 PHYS/QHP OP CAR RHAB W/ECG: CPT

## 2018-07-02 NOTE — TELEPHONE ENCOUNTER
Call back from [facility]-sts dr Jose Gurrola will see pt this Thursday-7/5-to arrive by 100pm for 130 pm appt.  Requests pt start clear liquids when she gets up that morning in case  recommends colonoscopy, which she would then do on Frida

## 2018-07-02 NOTE — TELEPHONE ENCOUNTER
Call back from [facility]-sts dr Rohan Howard doing procedures this wk and covering hosp next wk. asking if pt needs to see dr Rohan Howard or if she can see one of her partners. Advised pt and gordon requesting dr Rohan Howard.  Advised I p

## 2018-07-02 NOTE — TELEPHONE ENCOUNTER
Pt wants to talk to nurse or gordon   She said [facility] asked her to call back to discuss her test  Please call pt

## 2018-07-02 NOTE — TELEPHONE ENCOUNTER
Call back to pt-sts \"saw dr Rolando Jerry this morning.  He and dr Norma Braun both said they were surprised and dr Rolando Jerry said it would be rare that there would be anything going on with my colon when my last colonoscopy a couple years ago was normal. ganesh singh pl

## 2018-07-04 ENCOUNTER — APPOINTMENT (OUTPATIENT)
Dept: CARDIAC REHAB | Facility: HOSPITAL | Age: 58
End: 2018-07-04
Attending: INTERNAL MEDICINE
Payer: COMMERCIAL

## 2018-07-06 ENCOUNTER — APPOINTMENT (OUTPATIENT)
Dept: CARDIAC REHAB | Facility: HOSPITAL | Age: 58
End: 2018-07-06
Attending: INTERNAL MEDICINE
Payer: COMMERCIAL

## 2018-07-09 ENCOUNTER — APPOINTMENT (OUTPATIENT)
Dept: CARDIAC REHAB | Facility: HOSPITAL | Age: 58
End: 2018-07-09
Attending: INTERNAL MEDICINE
Payer: COMMERCIAL

## 2018-07-10 ENCOUNTER — PRIOR ORIGINAL RECORDS (OUTPATIENT)
Dept: OTHER | Age: 58
End: 2018-07-10

## 2018-07-11 ENCOUNTER — APPOINTMENT (OUTPATIENT)
Dept: CARDIAC REHAB | Facility: HOSPITAL | Age: 58
End: 2018-07-11
Attending: INTERNAL MEDICINE
Payer: COMMERCIAL

## 2018-07-11 ENCOUNTER — TELEPHONE (OUTPATIENT)
Dept: FAMILY MEDICINE CLINIC | Facility: CLINIC | Age: 58
End: 2018-07-11

## 2018-07-11 NOTE — TELEPHONE ENCOUNTER
Left message to call back-Left message on voicemail that Tracy Alexander doesn't typically prescribe narcotics without being seen first, is she wanting to take it post-operatively?, if so,  she needs to get that order from provider (who is also an MD) who will

## 2018-07-11 NOTE — TELEPHONE ENCOUNTER
Pt called and wanted to know if she can get some Hydrocodone to be prescribed. Pt advised that she took that a long time ago after her heart surgery and it helped her with the pain.   She's going to be having abdominal surgery on 7/27 and she's in a lot of

## 2018-07-13 ENCOUNTER — APPOINTMENT (OUTPATIENT)
Dept: CARDIAC REHAB | Facility: HOSPITAL | Age: 58
End: 2018-07-13
Attending: INTERNAL MEDICINE
Payer: COMMERCIAL

## 2018-07-13 NOTE — TELEPHONE ENCOUNTER
Am aware of pt's recent hx. Call to pt-sts generalized abdominal discomfort increased slightly after colonoscopy. \"had some norco left from heart surgery several months ago, so have taken one half to one tablet as needed w relief.  advil can baother my sto

## 2018-07-13 NOTE — TELEPHONE ENCOUNTER
Call to pt-reinforced comments/recmomendations noted below from [facility] APN. Patient voices understanding/agrees with plan/no further questions.

## 2018-07-13 NOTE — TELEPHONE ENCOUNTER
Have her continue Advil every 6 hours as needed with food for discomfort. Please update me with any severe pain.

## 2018-07-16 ENCOUNTER — APPOINTMENT (OUTPATIENT)
Dept: CARDIAC REHAB | Facility: HOSPITAL | Age: 58
End: 2018-07-16
Attending: INTERNAL MEDICINE
Payer: COMMERCIAL

## 2018-07-18 ENCOUNTER — APPOINTMENT (OUTPATIENT)
Dept: CARDIAC REHAB | Facility: HOSPITAL | Age: 58
End: 2018-07-18
Attending: INTERNAL MEDICINE
Payer: COMMERCIAL

## 2018-07-19 ENCOUNTER — TELEPHONE (OUTPATIENT)
Dept: FAMILY MEDICINE CLINIC | Facility: CLINIC | Age: 58
End: 2018-07-19

## 2018-07-20 ENCOUNTER — PRIOR ORIGINAL RECORDS (OUTPATIENT)
Dept: OTHER | Age: 58
End: 2018-07-20

## 2018-07-20 ENCOUNTER — APPOINTMENT (OUTPATIENT)
Dept: CARDIAC REHAB | Facility: HOSPITAL | Age: 58
End: 2018-07-20
Attending: INTERNAL MEDICINE
Payer: COMMERCIAL

## 2018-07-20 NOTE — TELEPHONE ENCOUNTER
Noted and gordon aware. Pt's surgery w dr Eliazar Alford (laparoscopy/poss NATALEE/further recommendations re chemo, etc) originally scheduled for 7/27/18.

## 2018-07-23 ENCOUNTER — APPOINTMENT (OUTPATIENT)
Dept: CARDIAC REHAB | Facility: HOSPITAL | Age: 58
End: 2018-07-23
Attending: INTERNAL MEDICINE
Payer: COMMERCIAL

## 2018-07-24 ENCOUNTER — PRIOR ORIGINAL RECORDS (OUTPATIENT)
Dept: OTHER | Age: 58
End: 2018-07-24

## 2018-07-25 ENCOUNTER — APPOINTMENT (OUTPATIENT)
Dept: CARDIAC REHAB | Facility: HOSPITAL | Age: 58
End: 2018-07-25
Attending: INTERNAL MEDICINE
Payer: COMMERCIAL

## 2018-07-27 ENCOUNTER — APPOINTMENT (OUTPATIENT)
Dept: CARDIAC REHAB | Facility: HOSPITAL | Age: 58
End: 2018-07-27
Attending: INTERNAL MEDICINE
Payer: COMMERCIAL

## 2018-07-27 ENCOUNTER — PRIOR ORIGINAL RECORDS (OUTPATIENT)
Dept: OTHER | Age: 58
End: 2018-07-27

## 2018-07-30 ENCOUNTER — APPOINTMENT (OUTPATIENT)
Dept: CARDIAC REHAB | Facility: HOSPITAL | Age: 58
End: 2018-07-30
Attending: INTERNAL MEDICINE
Payer: COMMERCIAL

## 2018-08-01 ENCOUNTER — APPOINTMENT (OUTPATIENT)
Dept: CARDIAC REHAB | Facility: HOSPITAL | Age: 58
End: 2018-08-01
Attending: INTERNAL MEDICINE
Payer: COMMERCIAL

## 2018-08-03 ENCOUNTER — PRIOR ORIGINAL RECORDS (OUTPATIENT)
Dept: OTHER | Age: 58
End: 2018-08-03

## 2018-08-03 ENCOUNTER — APPOINTMENT (OUTPATIENT)
Dept: CARDIAC REHAB | Facility: HOSPITAL | Age: 58
End: 2018-08-03
Attending: INTERNAL MEDICINE
Payer: COMMERCIAL

## 2018-08-10 ENCOUNTER — PRIOR ORIGINAL RECORDS (OUTPATIENT)
Dept: OTHER | Age: 58
End: 2018-08-10

## 2018-08-10 ENCOUNTER — HOSPITAL ENCOUNTER (OUTPATIENT)
Dept: CT IMAGING | Age: 58
Discharge: HOME | End: 2018-08-10
Attending: OBSTETRICS & GYNECOLOGY
Payer: COMMERCIAL

## 2018-08-10 DIAGNOSIS — C56.9 OVARIAN CANCER (HCC): ICD-10-CM

## 2018-08-10 LAB — CREAT SERPL-MCNC: 0.6 MG/DL (ref 0.55–1.02)

## 2018-08-10 PROCEDURE — 71260 CT THORAX DX C+: CPT | Performed by: OBSTETRICS & GYNECOLOGY

## 2018-08-10 PROCEDURE — 82565 ASSAY OF CREATININE: CPT

## 2018-08-10 PROCEDURE — 74177 CT ABD & PELVIS W/CONTRAST: CPT | Performed by: OBSTETRICS & GYNECOLOGY

## 2018-08-21 ENCOUNTER — NURSE ONLY (OUTPATIENT)
Dept: LAB | Age: 58
End: 2018-08-21
Attending: OBSTETRICS & GYNECOLOGY
Payer: COMMERCIAL

## 2018-08-21 LAB
ATRIAL RATE: 71 BPM
P AXIS: 18 DEGREES
P-R INTERVAL: 144 MS
Q-T INTERVAL: 410 MS
QRS DURATION: 82 MS
QTC CALCULATION (BEZET): 445 MS
R AXIS: 40 DEGREES
T AXIS: 47 DEGREES
VENTRICULAR RATE: 71 BPM

## 2018-08-21 PROCEDURE — 93010 ELECTROCARDIOGRAM REPORT: CPT | Performed by: INTERNAL MEDICINE

## 2018-08-21 PROCEDURE — 93005 ELECTROCARDIOGRAM TRACING: CPT

## 2018-08-22 DIAGNOSIS — E78.00 HYPERCHOLESTEREMIA: ICD-10-CM

## 2018-08-22 RX ORDER — ROSUVASTATIN CALCIUM 40 MG/1
TABLET, COATED ORAL
Qty: 90 TABLET | Refills: 0 | Status: SHIPPED | OUTPATIENT
Start: 2018-08-22 | End: 2018-10-19

## 2018-08-31 ENCOUNTER — PRIOR ORIGINAL RECORDS (OUTPATIENT)
Dept: OTHER | Age: 58
End: 2018-08-31

## 2018-09-06 ENCOUNTER — LAB ENCOUNTER (OUTPATIENT)
Dept: LAB | Age: 58
End: 2018-09-06
Attending: OBSTETRICS & GYNECOLOGY
Payer: COMMERCIAL

## 2018-09-06 DIAGNOSIS — C56.1 MALIGNANT NEOPLASM OF RIGHT OVARY (HCC): Primary | ICD-10-CM

## 2018-09-06 DIAGNOSIS — C56.2 MALIGNANT NEOPLASM OF LEFT OVARY (HCC): ICD-10-CM

## 2018-09-06 LAB
BASOPHILS # BLD AUTO: 0.02 X10(3) UL (ref 0–0.1)
BASOPHILS NFR BLD AUTO: 0.6 %
EOSINOPHIL # BLD AUTO: 0.12 X10(3) UL (ref 0–0.3)
EOSINOPHIL NFR BLD AUTO: 3.5 %
ERYTHROCYTE [DISTWIDTH] IN BLOOD BY AUTOMATED COUNT: 14.6 % (ref 11.5–16)
HCT VFR BLD AUTO: 34.2 % (ref 34–50)
HGB BLD-MCNC: 10.3 G/DL (ref 12–16)
IMMATURE GRANULOCYTE COUNT: 0.01 X10(3) UL (ref 0–1)
IMMATURE GRANULOCYTE RATIO %: 0.3 %
LYMPHOCYTES # BLD AUTO: 1.41 X10(3) UL (ref 0.9–4)
LYMPHOCYTES NFR BLD AUTO: 41.6 %
MCH RBC QN AUTO: 26.6 PG (ref 27–33.2)
MCHC RBC AUTO-ENTMCNC: 30.1 G/DL (ref 31–37)
MCV RBC AUTO: 88.4 FL (ref 81–100)
MONOCYTES # BLD AUTO: 0.19 X10(3) UL (ref 0.1–1)
MONOCYTES NFR BLD AUTO: 5.6 %
NEUTROPHIL ABS PRELIM: 1.64 X10 (3) UL (ref 1.3–6.7)
NEUTROPHILS # BLD AUTO: 1.64 X10(3) UL (ref 1.3–6.7)
NEUTROPHILS NFR BLD AUTO: 48.4 %
PLATELET # BLD AUTO: 338 10(3)UL (ref 150–450)
RBC # BLD AUTO: 3.87 X10(6)UL (ref 3.8–5.1)
RED CELL DISTRIBUTION WIDTH-SD: 47 FL (ref 35.1–46.3)
WBC # BLD AUTO: 3.4 X10(3) UL (ref 4–13)

## 2018-09-06 PROCEDURE — 85025 COMPLETE CBC W/AUTO DIFF WBC: CPT

## 2018-09-06 PROCEDURE — 36415 COLL VENOUS BLD VENIPUNCTURE: CPT

## 2018-09-12 ENCOUNTER — LAB ENCOUNTER (OUTPATIENT)
Dept: LAB | Age: 58
End: 2018-09-12
Attending: OBSTETRICS & GYNECOLOGY
Payer: COMMERCIAL

## 2018-09-12 DIAGNOSIS — C56.2 MALIGNANT NEOPLASM OF LEFT OVARY (HCC): ICD-10-CM

## 2018-09-12 DIAGNOSIS — C56.1 MALIGNANT NEOPLASM OF RIGHT OVARY (HCC): Primary | ICD-10-CM

## 2018-09-12 LAB
BASOPHILS # BLD AUTO: 0.03 X10(3) UL (ref 0–0.1)
BASOPHILS NFR BLD AUTO: 1.2 %
EOSINOPHIL # BLD AUTO: 0.16 X10(3) UL (ref 0–0.3)
EOSINOPHIL NFR BLD AUTO: 6.4 %
ERYTHROCYTE [DISTWIDTH] IN BLOOD BY AUTOMATED COUNT: 15.2 % (ref 11.5–16)
HCT VFR BLD AUTO: 32 % (ref 34–50)
HGB BLD-MCNC: 10 G/DL (ref 12–16)
IMMATURE GRANULOCYTE COUNT: 0 X10(3) UL (ref 0–1)
IMMATURE GRANULOCYTE RATIO %: 0 %
LYMPHOCYTES # BLD AUTO: 1.23 X10(3) UL (ref 0.9–4)
LYMPHOCYTES NFR BLD AUTO: 49.4 %
MCH RBC QN AUTO: 27 PG (ref 27–33.2)
MCHC RBC AUTO-ENTMCNC: 31.3 G/DL (ref 31–37)
MCV RBC AUTO: 86.3 FL (ref 81–100)
MONOCYTES # BLD AUTO: 0.41 X10(3) UL (ref 0.1–1)
MONOCYTES NFR BLD AUTO: 16.5 %
NEUTROPHIL ABS PRELIM: 0.66 X10 (3) UL (ref 1.3–6.7)
NEUTROPHILS # BLD AUTO: 0.66 X10(3) UL (ref 1.3–6.7)
NEUTROPHILS NFR BLD AUTO: 26.5 %
PLATELET # BLD AUTO: 180 10(3)UL (ref 150–450)
RBC # BLD AUTO: 3.71 X10(6)UL (ref 3.8–5.1)
RED CELL DISTRIBUTION WIDTH-SD: 45.8 FL (ref 35.1–46.3)
WBC # BLD AUTO: 2.5 X10(3) UL (ref 4–13)

## 2018-09-12 PROCEDURE — 36415 COLL VENOUS BLD VENIPUNCTURE: CPT

## 2018-09-12 PROCEDURE — 85025 COMPLETE CBC W/AUTO DIFF WBC: CPT

## 2018-09-24 ENCOUNTER — HOSPITAL ENCOUNTER (OUTPATIENT)
Dept: GENERAL RADIOLOGY | Age: 58
Discharge: HOME | End: 2018-09-24
Attending: INTERNAL MEDICINE
Payer: COMMERCIAL

## 2018-09-24 DIAGNOSIS — R19.4 CHANGE IN BOWEL HABITS: ICD-10-CM

## 2018-09-24 PROCEDURE — 74018 RADEX ABDOMEN 1 VIEW: CPT | Performed by: INTERNAL MEDICINE

## 2018-09-26 NOTE — PROGRESS NOTES
Results reviewed. Released to [address] Spoke to pt who is doing much better on Miralax bid. Feels no pain, and thankful for her improvement. KUB normal. Relayed to pt.

## 2018-10-02 ENCOUNTER — LAB ENCOUNTER (OUTPATIENT)
Dept: LAB | Age: 58
End: 2018-10-02
Attending: OBSTETRICS & GYNECOLOGY
Payer: COMMERCIAL

## 2018-10-02 DIAGNOSIS — C56.2 MALIGNANT NEOPLASM OF LEFT OVARY (HCC): ICD-10-CM

## 2018-10-02 DIAGNOSIS — C56.1 MALIGNANT NEOPLASM OF RIGHT OVARY (HCC): Primary | ICD-10-CM

## 2018-10-02 PROCEDURE — 85025 COMPLETE CBC W/AUTO DIFF WBC: CPT

## 2018-10-02 PROCEDURE — 36415 COLL VENOUS BLD VENIPUNCTURE: CPT

## 2018-10-12 ENCOUNTER — LAB ENCOUNTER (OUTPATIENT)
Dept: LAB | Age: 58
End: 2018-10-12
Attending: OBSTETRICS & GYNECOLOGY
Payer: COMMERCIAL

## 2018-10-12 DIAGNOSIS — C56.1 MALIGNANT NEOPLASM OF RIGHT OVARY (HCC): Primary | ICD-10-CM

## 2018-10-12 DIAGNOSIS — E83.42 HYPOMAGNESEMIA: ICD-10-CM

## 2018-10-12 DIAGNOSIS — R80.9 PROTEINURIA: ICD-10-CM

## 2018-10-12 DIAGNOSIS — C56.2 MALIGNANT NEOPLASM OF LEFT OVARY (HCC): ICD-10-CM

## 2018-10-12 PROCEDURE — 83735 ASSAY OF MAGNESIUM: CPT

## 2018-10-12 PROCEDURE — 36415 COLL VENOUS BLD VENIPUNCTURE: CPT

## 2018-10-12 PROCEDURE — 85025 COMPLETE CBC W/AUTO DIFF WBC: CPT

## 2018-10-12 PROCEDURE — 81001 URINALYSIS AUTO W/SCOPE: CPT

## 2018-10-12 PROCEDURE — 86304 IMMUNOASSAY TUMOR CA 125: CPT

## 2018-10-12 PROCEDURE — 84100 ASSAY OF PHOSPHORUS: CPT

## 2018-10-12 PROCEDURE — 80053 COMPREHEN METABOLIC PANEL: CPT

## 2018-10-14 ENCOUNTER — HOSPITAL ENCOUNTER (OUTPATIENT)
Age: 58
Discharge: HOME | End: 2018-10-14
Attending: FAMILY MEDICINE
Payer: COMMERCIAL

## 2018-10-14 VITALS
HEART RATE: 94 BPM | DIASTOLIC BLOOD PRESSURE: 86 MMHG | RESPIRATION RATE: 18 BRPM | BODY MASS INDEX: 28.34 KG/M2 | TEMPERATURE: 99 F | SYSTOLIC BLOOD PRESSURE: 158 MMHG | OXYGEN SATURATION: 100 % | HEIGHT: 62 IN | WEIGHT: 154 LBS

## 2018-10-14 DIAGNOSIS — D84.9 IMMUNOCOMPROMISED (HCC): ICD-10-CM

## 2018-10-14 DIAGNOSIS — R03.0 ELEVATED BLOOD PRESSURE READING: ICD-10-CM

## 2018-10-14 DIAGNOSIS — J02.9 PHARYNGITIS, UNSPECIFIED ETIOLOGY: Primary | ICD-10-CM

## 2018-10-14 PROCEDURE — 87081 CULTURE SCREEN ONLY: CPT | Performed by: FAMILY MEDICINE

## 2018-10-14 PROCEDURE — 99204 OFFICE O/P NEW MOD 45 MIN: CPT

## 2018-10-14 PROCEDURE — 87430 STREP A AG IA: CPT | Performed by: FAMILY MEDICINE

## 2018-10-14 PROCEDURE — 99214 OFFICE O/P EST MOD 30 MIN: CPT

## 2018-10-14 RX ORDER — AMOXICILLIN 875 MG/1
875 TABLET, COATED ORAL 2 TIMES DAILY
Qty: 20 TABLET | Refills: 0 | Status: SHIPPED | OUTPATIENT
Start: 2018-10-14 | End: 2018-10-24

## 2018-10-14 NOTE — ED INITIAL ASSESSMENT (HPI)
Pt c/o sore throat x 1 week. Pt states feel like strep. \"Temperature feels higher than normal\". No chills. Pt scheduled to have 3rd round of chemotherapy tomorrow for ovarian cancer.

## 2018-10-14 NOTE — ED PROVIDER NOTES
Patient Seen in: [facility]    History   Patient presents with:  Sore Throat    Stated Complaint: sore throat    HPI  61 yo F with pain associated with swallowing and sore throat   No exposure to anyone with strep as far as she [facility]  03/22/2018    CABG x 2   • CABG      On 3/22/2018: CABG x 2 (LIMA to LAD and SVG to RCA)   • COLONOSCOPY  age 45    DR. Jeanne Barthel. benign polyps. Tics.  Repeat in 3 years   • COLONOSCOPY N/A 3/30/2017    Procedure: COLONOSCOPY;  Surgeon: Meredith Clark CTAB, no RRW  CV: RRR  ABD: not distended  NEURO: Alert and oriented to person place and time  GAIT: Normal          ED Course     Labs Reviewed   POCT RAPID STREP - Normal   GRP A STREP CULT, THROAT     Orders Placed This Encounter      POCT RAPID STREP O

## 2018-10-19 ENCOUNTER — OFFICE VISIT (OUTPATIENT)
Dept: FAMILY MEDICINE CLINIC | Facility: CLINIC | Age: 58
End: 2018-10-19
Payer: COMMERCIAL

## 2018-10-19 VITALS
RESPIRATION RATE: 14 BRPM | BODY MASS INDEX: 27.97 KG/M2 | SYSTOLIC BLOOD PRESSURE: 138 MMHG | DIASTOLIC BLOOD PRESSURE: 76 MMHG | WEIGHT: 152 LBS | HEART RATE: 74 BPM | HEIGHT: 62 IN | TEMPERATURE: 98 F

## 2018-10-19 DIAGNOSIS — I10 ESSENTIAL HYPERTENSION: Primary | ICD-10-CM

## 2018-10-19 DIAGNOSIS — E78.00 HYPERCHOLESTEREMIA: ICD-10-CM

## 2018-10-19 PROCEDURE — 99214 OFFICE O/P EST MOD 30 MIN: CPT | Performed by: NURSE PRACTITIONER

## 2018-10-19 RX ORDER — METOPROLOL SUCCINATE 50 MG/1
50 TABLET, EXTENDED RELEASE ORAL DAILY
Qty: 30 TABLET | Refills: 2 | Status: SHIPPED | OUTPATIENT
Start: 2018-10-19 | End: 2019-01-21

## 2018-10-19 RX ORDER — ROSUVASTATIN CALCIUM 40 MG/1
TABLET, COATED ORAL
Qty: 90 TABLET | Refills: 0 | Status: SHIPPED | OUTPATIENT
Start: 2018-10-19 | End: 2019-01-27

## 2018-10-19 RX ORDER — METOPROLOL SUCCINATE 25 MG/1
25 TABLET, EXTENDED RELEASE ORAL DAILY
Qty: 30 TABLET | Refills: 5 | Status: CANCELLED | OUTPATIENT
Start: 2018-10-19

## 2018-10-19 NOTE — PROGRESS NOTES
Patients blood pressure check (at home)  10/16/18 (8:30am) - 156/94   10/17/18 (11:30pm)- 143/73  10/18/18 (8:00 am)- 142/81  10/19/18 (7:79 am)-143/88

## 2018-10-20 NOTE — PROGRESS NOTES
Katy Green is a 60year old female. HPI:   Patient presents today for a follow-up on her cholesterol and hypertension. Patient reports she has been taking her metoprolol and rosuvastatin as ordered. She denies any side effects of the medications.   Rui Rosales two weeks  Disp:  Rfl:    aspirin  MG Oral Tab EC Take 1 tablet (325 mg total) by mouth daily. Disp: 30 tablet Rfl: 11   Metoprolol Succinate ER 25 MG Oral Tablet 24 Hr Take 1 tablet (25 mg total) by mouth daily.  Disp: 30 tablet Rfl: 5   Cholecalcife exertion  GI: denies abdominal pain  NEURO: denies headaches. Denies any numbness or tingling.   EXAM:   /76   Pulse 74   Temp 98.4 °F (36.9 °C) (Oral)   Resp 14   Ht 62\"   Wt 152 lb   LMP 04/08/2010   BMI 27.80 kg/m²   GENERAL: well developed, well Refill: 2    2. Hypercholesteremia  Continue Rosuvastatin as ordered. Pt is due for lab work. CMP and Lipids ordered. - Rosuvastatin Calcium 40 MG Oral Tab; TAKE 1 TABLET(40 MG) BY MOUTH EVERY DAY  Dispense: 90 tablet;  Refill: 0  - COMP METABOLIC PANEL (

## 2018-10-22 ENCOUNTER — LAB ENCOUNTER (OUTPATIENT)
Dept: LAB | Age: 58
End: 2018-10-22
Attending: OBSTETRICS & GYNECOLOGY
Payer: COMMERCIAL

## 2018-10-22 DIAGNOSIS — E78.00 HYPERCHOLESTEREMIA: ICD-10-CM

## 2018-10-22 DIAGNOSIS — C56.1 MALIGNANT NEOPLASM OF RIGHT OVARY (HCC): Primary | ICD-10-CM

## 2018-10-22 DIAGNOSIS — C56.2 MALIGNANT NEOPLASM OF LEFT OVARY (HCC): ICD-10-CM

## 2018-10-22 DIAGNOSIS — I10 ESSENTIAL HYPERTENSION: ICD-10-CM

## 2018-10-22 PROCEDURE — 36415 COLL VENOUS BLD VENIPUNCTURE: CPT

## 2018-10-22 PROCEDURE — 85025 COMPLETE CBC W/AUTO DIFF WBC: CPT

## 2018-10-29 ENCOUNTER — LAB ENCOUNTER (OUTPATIENT)
Dept: LAB | Age: 58
End: 2018-10-29
Attending: OBSTETRICS & GYNECOLOGY
Payer: COMMERCIAL

## 2018-10-29 DIAGNOSIS — I10 ESSENTIAL HYPERTENSION: ICD-10-CM

## 2018-10-29 DIAGNOSIS — E78.00 HYPERCHOLESTEREMIA: ICD-10-CM

## 2018-10-29 DIAGNOSIS — C56.1 MALIGNANT NEOPLASM OF RIGHT OVARY (HCC): Primary | ICD-10-CM

## 2018-10-29 DIAGNOSIS — C56.2 MALIGNANT NEOPLASM OF LEFT OVARY (HCC): ICD-10-CM

## 2018-10-29 PROCEDURE — 80053 COMPREHEN METABOLIC PANEL: CPT | Performed by: NURSE PRACTITIONER

## 2018-10-29 PROCEDURE — 80061 LIPID PANEL: CPT | Performed by: NURSE PRACTITIONER

## 2018-11-02 ENCOUNTER — TELEPHONE (OUTPATIENT)
Dept: FAMILY MEDICINE CLINIC | Facility: CLINIC | Age: 58
End: 2018-11-02

## 2018-11-02 ENCOUNTER — NURSE ONLY (OUTPATIENT)
Dept: FAMILY MEDICINE CLINIC | Facility: CLINIC | Age: 58
End: 2018-11-02
Payer: COMMERCIAL

## 2018-11-02 VITALS — DIASTOLIC BLOOD PRESSURE: 82 MMHG | HEART RATE: 70 BPM | SYSTOLIC BLOOD PRESSURE: 138 MMHG

## 2018-11-02 DIAGNOSIS — R89.9 ABNORMAL LABORATORY TEST RESULT: ICD-10-CM

## 2018-11-02 DIAGNOSIS — I10 ESSENTIAL HYPERTENSION: ICD-10-CM

## 2018-11-02 DIAGNOSIS — E87.6 LOW SERUM POTASSIUM LEVEL: Primary | ICD-10-CM

## 2018-11-02 PROCEDURE — 99211 OFF/OP EST MAY X REQ PHY/QHP: CPT | Performed by: FAMILY MEDICINE

## 2018-11-02 RX ORDER — LISINOPRIL AND HYDROCHLOROTHIAZIDE 12.5; 1 MG/1; MG/1
1 TABLET ORAL DAILY
Qty: 30 TABLET | Refills: 1 | Status: SHIPPED | OUTPATIENT
Start: 2018-11-02 | End: 2019-01-01

## 2018-11-02 NOTE — TELEPHONE ENCOUNTER
Information and recommendations given to patient, voiced understanding, agreed with plan. orders entered.

## 2018-11-02 NOTE — TELEPHONE ENCOUNTER
Patient in office for BP Check. BP - 148/84, P-70,  No sob, no chest pain, no vision issues. After 10 minutes BP-138/82. Currently taking Metoprolol 50 mg daily.   States if her Blood pressure is elevated, they will not administered chemotherapy    Plea

## 2018-11-02 NOTE — TELEPHONE ENCOUNTER
After review labs pt. Need sto repeat potassium level.   Start lisinopril 10/12.5 mg daily #30 ref. 1 and follow up in 1 month

## 2018-11-05 ENCOUNTER — HOSPITAL ENCOUNTER (OUTPATIENT)
Dept: CT IMAGING | Age: 58
Discharge: HOME | End: 2018-11-05
Attending: OBSTETRICS & GYNECOLOGY
Payer: COMMERCIAL

## 2018-11-05 VITALS
DIASTOLIC BLOOD PRESSURE: 80 MMHG | TEMPERATURE: 98.3 F | SYSTOLIC BLOOD PRESSURE: 118 MMHG | HEIGHT: 62 IN | HEART RATE: 89 BPM | RESPIRATION RATE: 18 BRPM | WEIGHT: 164 LBS | BODY MASS INDEX: 30.18 KG/M2

## 2018-11-05 DIAGNOSIS — C56.9 OVARIAN CANCER (HCC): ICD-10-CM

## 2018-11-05 PROCEDURE — 74177 CT ABD & PELVIS W/CONTRAST: CPT | Performed by: OBSTETRICS & GYNECOLOGY

## 2018-11-05 PROCEDURE — 71260 CT THORAX DX C+: CPT | Performed by: OBSTETRICS & GYNECOLOGY

## 2018-11-06 ENCOUNTER — LAB ENCOUNTER (OUTPATIENT)
Dept: LAB | Age: 58
End: 2018-11-06
Attending: OBSTETRICS & GYNECOLOGY
Payer: COMMERCIAL

## 2018-11-06 DIAGNOSIS — E55.9 VITAMIN D DEFICIENCY: ICD-10-CM

## 2018-11-06 DIAGNOSIS — E87.6 HYPOKALEMIA: ICD-10-CM

## 2018-11-06 DIAGNOSIS — R89.9 ABNORMAL LABORATORY TEST RESULT: ICD-10-CM

## 2018-11-06 DIAGNOSIS — E06.3 CHRONIC LYMPHOCYTIC THYROIDITIS: Primary | ICD-10-CM

## 2018-11-06 DIAGNOSIS — E87.6 LOW SERUM POTASSIUM LEVEL: ICD-10-CM

## 2018-11-06 DIAGNOSIS — D50.9 IRON DEFICIENCY ANEMIA, UNSPECIFIED IRON DEFICIENCY ANEMIA TYPE: ICD-10-CM

## 2018-11-06 PROCEDURE — 83550 IRON BINDING TEST: CPT | Performed by: FAMILY MEDICINE

## 2018-11-06 PROCEDURE — 84132 ASSAY OF SERUM POTASSIUM: CPT | Performed by: FAMILY MEDICINE

## 2018-11-06 PROCEDURE — 36415 COLL VENOUS BLD VENIPUNCTURE: CPT | Performed by: FAMILY MEDICINE

## 2018-11-06 PROCEDURE — 82306 VITAMIN D 25 HYDROXY: CPT | Performed by: FAMILY MEDICINE

## 2018-11-06 PROCEDURE — 82728 ASSAY OF FERRITIN: CPT | Performed by: FAMILY MEDICINE

## 2018-11-06 PROCEDURE — 83540 ASSAY OF IRON: CPT | Performed by: FAMILY MEDICINE

## 2018-11-06 PROCEDURE — 85025 COMPLETE CBC W/AUTO DIFF WBC: CPT | Performed by: FAMILY MEDICINE

## 2018-11-08 DIAGNOSIS — E87.6 HYPOKALEMIA: Primary | ICD-10-CM

## 2018-11-09 ENCOUNTER — TELEPHONE (OUTPATIENT)
Dept: FAMILY MEDICINE CLINIC | Facility: CLINIC | Age: 58
End: 2018-11-09

## 2018-11-09 RX ORDER — POTASSIUM CHLORIDE 750 MG/1
10 TABLET, FILM COATED, EXTENDED RELEASE ORAL DAILY
Qty: 30 TABLET | Refills: 0 | OUTPATIENT
Start: 2018-11-09 | End: 2018-11-28

## 2018-11-09 NOTE — TELEPHONE ENCOUNTER
timothy menendez 10 meq order placed per result note order from [facility] APN.

## 2018-11-15 ENCOUNTER — LAB ENCOUNTER (OUTPATIENT)
Dept: LAB | Age: 58
End: 2018-11-15
Attending: OBSTETRICS & GYNECOLOGY
Payer: COMMERCIAL

## 2018-11-15 DIAGNOSIS — C56.2 MALIGNANT NEOPLASM OF LEFT OVARY (HCC): ICD-10-CM

## 2018-11-15 DIAGNOSIS — C56.1 MALIGNANT NEOPLASM OF RIGHT OVARY (HCC): Primary | ICD-10-CM

## 2018-11-15 PROCEDURE — 85025 COMPLETE CBC W/AUTO DIFF WBC: CPT

## 2018-11-15 PROCEDURE — 36415 COLL VENOUS BLD VENIPUNCTURE: CPT

## 2018-11-21 ENCOUNTER — LAB ENCOUNTER (OUTPATIENT)
Dept: LAB | Age: 58
End: 2018-11-21
Attending: OBSTETRICS & GYNECOLOGY
Payer: COMMERCIAL

## 2018-11-21 DIAGNOSIS — C56.2 MALIGNANT NEOPLASM OF LEFT OVARY (HCC): ICD-10-CM

## 2018-11-21 DIAGNOSIS — C65.1 MALIGNANT NEOPLASM OF RIGHT RENAL PELVIS (HCC): Primary | ICD-10-CM

## 2018-11-21 PROCEDURE — 85025 COMPLETE CBC W/AUTO DIFF WBC: CPT

## 2018-11-21 PROCEDURE — 36415 COLL VENOUS BLD VENIPUNCTURE: CPT

## 2018-11-27 ENCOUNTER — PRIOR ORIGINAL RECORDS (OUTPATIENT)
Dept: OTHER | Age: 58
End: 2018-11-27

## 2018-11-27 ENCOUNTER — LAB ENCOUNTER (OUTPATIENT)
Dept: LAB | Age: 58
End: 2018-11-27
Attending: OBSTETRICS & GYNECOLOGY
Payer: COMMERCIAL

## 2018-11-27 DIAGNOSIS — C56.2 MALIGNANT NEOPLASM OF LEFT OVARY (HCC): ICD-10-CM

## 2018-11-27 DIAGNOSIS — C56.1 MALIGNANT NEOPLASM OF RIGHT OVARY (HCC): Primary | ICD-10-CM

## 2018-11-27 DIAGNOSIS — E87.6 HYPOKALEMIA: ICD-10-CM

## 2018-11-27 DIAGNOSIS — R80.9 PROTEINURIA: ICD-10-CM

## 2018-11-27 DIAGNOSIS — E78.5 HYPERLIPEMIA: ICD-10-CM

## 2018-11-27 PROCEDURE — 84132 ASSAY OF SERUM POTASSIUM: CPT | Performed by: NURSE PRACTITIONER

## 2018-11-28 ENCOUNTER — TELEPHONE (OUTPATIENT)
Dept: FAMILY MEDICINE CLINIC | Facility: CLINIC | Age: 58
End: 2018-11-28

## 2018-11-28 DIAGNOSIS — E87.6 HYPOKALEMIA: Primary | ICD-10-CM

## 2018-12-06 ENCOUNTER — LAB ENCOUNTER (OUTPATIENT)
Dept: LAB | Age: 58
End: 2018-12-06
Attending: OBSTETRICS & GYNECOLOGY
Payer: COMMERCIAL

## 2018-12-06 ENCOUNTER — PRIOR ORIGINAL RECORDS (OUTPATIENT)
Dept: OTHER | Age: 58
End: 2018-12-06

## 2018-12-06 DIAGNOSIS — C56.9 MALIGNANT NEOPLASM OF OVARY (HCC): Primary | ICD-10-CM

## 2018-12-06 DIAGNOSIS — E87.6 HYPOKALEMIA: ICD-10-CM

## 2018-12-06 PROCEDURE — 36415 COLL VENOUS BLD VENIPUNCTURE: CPT

## 2018-12-06 PROCEDURE — 85025 COMPLETE CBC W/AUTO DIFF WBC: CPT

## 2018-12-11 ENCOUNTER — MYAURORA ACCOUNT LINK (OUTPATIENT)
Dept: OTHER | Age: 58
End: 2018-12-11

## 2018-12-11 ENCOUNTER — PRIOR ORIGINAL RECORDS (OUTPATIENT)
Dept: OTHER | Age: 58
End: 2018-12-11

## 2018-12-13 ENCOUNTER — LAB ENCOUNTER (OUTPATIENT)
Dept: LAB | Age: 58
End: 2018-12-13
Attending: OBSTETRICS & GYNECOLOGY
Payer: COMMERCIAL

## 2018-12-13 DIAGNOSIS — C56.9 MALIGNANT NEOPLASM OF OVARY (HCC): Primary | ICD-10-CM

## 2018-12-13 PROCEDURE — 85025 COMPLETE CBC W/AUTO DIFF WBC: CPT

## 2018-12-13 PROCEDURE — 36415 COLL VENOUS BLD VENIPUNCTURE: CPT

## 2018-12-18 ENCOUNTER — LAB ENCOUNTER (OUTPATIENT)
Dept: LAB | Age: 58
End: 2018-12-18
Attending: OBSTETRICS & GYNECOLOGY
Payer: COMMERCIAL

## 2018-12-18 DIAGNOSIS — E83.39 HYPOPHOSPHATURIA: ICD-10-CM

## 2018-12-18 DIAGNOSIS — C56.9 MALIGNANT NEOPLASM OF OVARY (HCC): Primary | ICD-10-CM

## 2018-12-18 PROCEDURE — 80053 COMPREHEN METABOLIC PANEL: CPT

## 2018-12-18 PROCEDURE — 86304 IMMUNOASSAY TUMOR CA 125: CPT

## 2018-12-18 PROCEDURE — 36415 COLL VENOUS BLD VENIPUNCTURE: CPT

## 2018-12-18 PROCEDURE — 83735 ASSAY OF MAGNESIUM: CPT

## 2018-12-18 PROCEDURE — 85025 COMPLETE CBC W/AUTO DIFF WBC: CPT

## 2018-12-18 PROCEDURE — 84100 ASSAY OF PHOSPHORUS: CPT

## 2018-12-27 ENCOUNTER — LAB ENCOUNTER (OUTPATIENT)
Dept: LAB | Age: 58
End: 2018-12-27
Attending: OBSTETRICS & GYNECOLOGY
Payer: COMMERCIAL

## 2018-12-27 DIAGNOSIS — C56.1 MALIGNANT NEOPLASM OF RIGHT OVARY (HCC): Primary | ICD-10-CM

## 2018-12-27 DIAGNOSIS — C56.2 MALIGNANT NEOPLASM OF LEFT OVARY (HCC): ICD-10-CM

## 2018-12-27 PROCEDURE — 85025 COMPLETE CBC W/AUTO DIFF WBC: CPT

## 2018-12-27 PROCEDURE — 36415 COLL VENOUS BLD VENIPUNCTURE: CPT

## 2018-12-28 LAB
ALBUMIN: 3.6 G/DL
ALKALINE PHOSPHATATE(ALK PHOS): 98 IU/L
BILIRUBIN TOTAL: 0.2 MG/DL
BUN: 14 MG/DL
CALCIUM: 8.9 MG/DL
CHLORIDE: 106 MEQ/L
CREATININE, SERUM: 0.88 MG/DL
GLOBULIN: 4.1 G/DL
GLUCOSE: 110 MG/DL
HEMATOCRIT: 27.4 %
HEMOGLOBIN: 8.8 G/DL
MAGNESIUM: 1.9 MG/DL
PLATELETS: 202 K/UL
POTASSIUM, SERUM: 4.1 MEQ/L
PROTEIN, TOTAL: 7.7 G/DL
RED BLOOD COUNT: 2.92 X 10-6/U
SGOT (AST): 39 IU/L
SGPT (ALT): 34 IU/L
SODIUM: 140 MEQ/L
WHITE BLOOD COUNT: 1.5 X 10-3/U

## 2018-12-30 ENCOUNTER — HOSPITAL ENCOUNTER (OUTPATIENT)
Dept: CT IMAGING | Age: 58
Discharge: HOME | End: 2018-12-30
Attending: OBSTETRICS & GYNECOLOGY
Payer: COMMERCIAL

## 2018-12-30 DIAGNOSIS — C56.9 OVARIAN CANCER (HCC): ICD-10-CM

## 2018-12-30 LAB — CREAT SERPL-MCNC: 0.5 MG/DL (ref 0.55–1.02)

## 2018-12-30 PROCEDURE — 74177 CT ABD & PELVIS W/CONTRAST: CPT | Performed by: OBSTETRICS & GYNECOLOGY

## 2018-12-30 PROCEDURE — 71260 CT THORAX DX C+: CPT | Performed by: OBSTETRICS & GYNECOLOGY

## 2018-12-30 PROCEDURE — 82565 ASSAY OF CREATININE: CPT

## 2019-01-01 ENCOUNTER — EXTERNAL RECORD (OUTPATIENT)
Dept: CARDIOLOGY | Age: 59
End: 2019-01-01

## 2019-01-21 DIAGNOSIS — I10 ESSENTIAL HYPERTENSION: ICD-10-CM

## 2019-01-21 RX ORDER — METOPROLOL SUCCINATE 50 MG/1
TABLET, EXTENDED RELEASE ORAL
Qty: 30 TABLET | Refills: 0 | Status: SHIPPED | OUTPATIENT
Start: 2019-01-21 | End: 2019-02-20

## 2019-01-21 NOTE — TELEPHONE ENCOUNTER
Please call patient to schedule a hypertension med check with . LOV 10/19/18 for hypertension. Per telephone encounter 11/02/18 asked to return in 1 month     Thanks!

## 2019-01-22 ENCOUNTER — OFFICE VISIT (OUTPATIENT)
Dept: FAMILY MEDICINE CLINIC | Facility: CLINIC | Age: 59
End: 2019-01-22
Payer: COMMERCIAL

## 2019-01-22 VITALS
BODY MASS INDEX: 27.42 KG/M2 | RESPIRATION RATE: 14 BRPM | HEART RATE: 64 BPM | WEIGHT: 149 LBS | DIASTOLIC BLOOD PRESSURE: 68 MMHG | SYSTOLIC BLOOD PRESSURE: 98 MMHG | HEIGHT: 62 IN

## 2019-01-22 DIAGNOSIS — R22.1 NECK MASS: ICD-10-CM

## 2019-01-22 DIAGNOSIS — Z11.59 ENCOUNTER FOR HEPATITIS C SCREENING TEST FOR LOW RISK PATIENT: ICD-10-CM

## 2019-01-22 DIAGNOSIS — D64.81 ANEMIA DUE TO CHEMOTHERAPY: ICD-10-CM

## 2019-01-22 DIAGNOSIS — Z13.228 SCREENING FOR ENDOCRINE, METABOLIC AND IMMUNITY DISORDER: ICD-10-CM

## 2019-01-22 DIAGNOSIS — Z13.0 SCREENING FOR ENDOCRINE, METABOLIC AND IMMUNITY DISORDER: ICD-10-CM

## 2019-01-22 DIAGNOSIS — Z13.29 SCREENING FOR ENDOCRINE, METABOLIC AND IMMUNITY DISORDER: ICD-10-CM

## 2019-01-22 DIAGNOSIS — Z78.0 MENOPAUSE: ICD-10-CM

## 2019-01-22 DIAGNOSIS — T45.1X5A ANEMIA DUE TO CHEMOTHERAPY: ICD-10-CM

## 2019-01-22 DIAGNOSIS — Z13.820 SCREENING FOR OSTEOPOROSIS: ICD-10-CM

## 2019-01-22 DIAGNOSIS — Z95.1 S/P CABG X 2: ICD-10-CM

## 2019-01-22 DIAGNOSIS — E87.6 HYPOKALEMIA: ICD-10-CM

## 2019-01-22 DIAGNOSIS — Z87.19 HISTORY OF DIVERTICULITIS: ICD-10-CM

## 2019-01-22 DIAGNOSIS — Z71.85 VACCINE COUNSELING: ICD-10-CM

## 2019-01-22 DIAGNOSIS — R73.03 PREDIABETES: ICD-10-CM

## 2019-01-22 DIAGNOSIS — C56.1 MALIGNANT NEOPLASM OF RIGHT OVARY (HCC): ICD-10-CM

## 2019-01-22 DIAGNOSIS — E78.00 HYPERCHOLESTEREMIA: ICD-10-CM

## 2019-01-22 DIAGNOSIS — I10 ESSENTIAL HYPERTENSION: Primary | ICD-10-CM

## 2019-01-22 PROBLEM — C56.9 OVARIAN CANCER (HCC): Status: ACTIVE | Noted: 2019-01-22

## 2019-01-22 PROCEDURE — 99214 OFFICE O/P EST MOD 30 MIN: CPT | Performed by: FAMILY MEDICINE

## 2019-01-22 RX ORDER — LIDOCAINE AND PRILOCAINE 25; 25 MG/G; MG/G
CREAM TOPICAL
Refills: 0 | COMMUNITY
Start: 2018-08-29 | End: 2020-06-19

## 2019-01-22 RX ORDER — LISINOPRIL AND HYDROCHLOROTHIAZIDE 12.5; 1 MG/1; MG/1
1 TABLET ORAL DAILY
COMMUNITY
End: 2019-05-03

## 2019-01-22 NOTE — PROGRESS NOTES
Esha Bird is a 60year old female. HPI:   Patient is here for follow-up. HTN -- Patient states that her blood pressures been doing well on the lisinopril/hydrochlorothiazide. Patient denies any side effects from the medication.   Patient is seeing D Rfl:    Parenteral Therapy Supplies (SHARPSAFETY SAFETY IN ROOM) Does not apply Misc USE WITH REPATHA AS DIRECTED Disp:  Rfl: 0   Alcohol Swabs ([employer] PREP) 70 % Does not apply Pads USE AS DIRECTED WITH REPATHA Disp:  Rfl: 11   Evolocumab (REPATHA SC) Inject 12/30/18   POCT CREATININE   Result Value Ref Range    ISTAT Creatinine 0.50 (L) 0.55 - 1.02 mg/dL       REVIEW OF SYSTEMS:   GENERAL: feels well otherwise  SKIN: denies any unusual skin lesions  EYES:denies blurred vision or double vision  HEENT: denies n this Visit:  Requested Prescriptions      No prescriptions requested or ordered in this encounter       Imaging & Consults:  XR DEXA BONE DENSITY AXIAL (CPT=77080)  US HEAD/NECK (NHN=48221)     HTN -- cut lisinopril/HCTZ 10/12.5 -- cut dose in 1/2 to 1/2 t

## 2019-01-27 DIAGNOSIS — E78.00 HYPERCHOLESTEREMIA: ICD-10-CM

## 2019-01-27 RX ORDER — ROSUVASTATIN CALCIUM 40 MG/1
TABLET, COATED ORAL
Qty: 90 TABLET | Refills: 1 | Status: SHIPPED | OUTPATIENT
Start: 2019-01-27 | End: 2019-06-27

## 2019-02-12 ENCOUNTER — PRIOR ORIGINAL RECORDS (OUTPATIENT)
Dept: OTHER | Age: 59
End: 2019-02-12

## 2019-02-15 ENCOUNTER — TELEPHONE (OUTPATIENT)
Dept: FAMILY MEDICINE CLINIC | Facility: CLINIC | Age: 59
End: 2019-02-15

## 2019-02-15 DIAGNOSIS — C56.9 MALIGNANT NEOPLASM OF OVARY, UNSPECIFIED LATERALITY (HCC): ICD-10-CM

## 2019-02-15 DIAGNOSIS — R93.89 ABNORMAL ULTRASOUND OF NECK: Primary | ICD-10-CM

## 2019-02-15 DIAGNOSIS — R22.1 NECK MASS: ICD-10-CM

## 2019-02-15 NOTE — TELEPHONE ENCOUNTER
Ultrasound of neck shows normal appearing right-sided level 3 lymph node measuring 0.7 x 0.3 x 0.9 cm with fatty hilum.  Left sided level 2 lymph node measures 0.9 x 0.5 x 1.5 cm--slightly enlarged, hypoechoic, without fatty hilum, possibly pathological. Cl

## 2019-02-15 NOTE — TELEPHONE ENCOUNTER
Confirmed w gordon APRN-pt to see Dr Mikey Coyle next wk-max 10 days. Call to dr Anthony Rao ofc-spoke w Get Melchor RN/triage-advised of above request from [facility] and dr Isak Nicole for consult asap w dr Anthony Ochoa.    Provided pt hx of stage 3 ovarian cancer

## 2019-02-15 NOTE — TELEPHONE ENCOUNTER
Call to pt-advised of  comments/recommendations re test results. Pt voices understanding/agrees with plan/no further questions. To pt-advised of gordon ORTIZ comments re u/s results. Pt confirms has never seen ENT.  Advised of info noted below re appt

## 2019-02-18 NOTE — TELEPHONE ENCOUNTER
Call from pt stating due to her [facility] ins and having met her deductibles w [facility], will need to see ENT thru [facility]. sts she has appt scheduled for tomorrow [facility] am w [facility], Dr Grupo Bustamante, so will need to cancel 2/21/19 appt w dr Barbara Beramn.   Advised pt w

## 2019-02-20 DIAGNOSIS — I10 ESSENTIAL HYPERTENSION: ICD-10-CM

## 2019-02-20 RX ORDER — METOPROLOL SUCCINATE 50 MG/1
TABLET, EXTENDED RELEASE ORAL
Qty: 30 TABLET | Refills: 2 | Status: SHIPPED | OUTPATIENT
Start: 2019-02-20 | End: 2019-05-19

## 2019-02-28 VITALS
HEIGHT: 62 IN | SYSTOLIC BLOOD PRESSURE: 152 MMHG | HEART RATE: 68 BPM | BODY MASS INDEX: 29.81 KG/M2 | DIASTOLIC BLOOD PRESSURE: 98 MMHG | WEIGHT: 162 LBS

## 2019-02-28 VITALS
HEART RATE: 94 BPM | BODY MASS INDEX: 27.6 KG/M2 | HEIGHT: 62 IN | SYSTOLIC BLOOD PRESSURE: 125 MMHG | DIASTOLIC BLOOD PRESSURE: 65 MMHG | WEIGHT: 150 LBS

## 2019-02-28 VITALS
BODY MASS INDEX: 32.76 KG/M2 | SYSTOLIC BLOOD PRESSURE: 156 MMHG | WEIGHT: 178 LBS | HEIGHT: 62 IN | HEART RATE: 66 BPM | DIASTOLIC BLOOD PRESSURE: 90 MMHG

## 2019-02-28 VITALS
HEART RATE: 84 BPM | DIASTOLIC BLOOD PRESSURE: 100 MMHG | HEIGHT: 62 IN | SYSTOLIC BLOOD PRESSURE: 160 MMHG | WEIGHT: 176 LBS | BODY MASS INDEX: 32.39 KG/M2

## 2019-02-28 VITALS
HEIGHT: 62 IN | HEART RATE: 68 BPM | BODY MASS INDEX: 30.91 KG/M2 | DIASTOLIC BLOOD PRESSURE: 60 MMHG | SYSTOLIC BLOOD PRESSURE: 108 MMHG | WEIGHT: 168 LBS

## 2019-03-01 VITALS
BODY MASS INDEX: 30.18 KG/M2 | SYSTOLIC BLOOD PRESSURE: 125 MMHG | HEART RATE: 95 BPM | DIASTOLIC BLOOD PRESSURE: 78 MMHG | HEIGHT: 62 IN | WEIGHT: 164 LBS

## 2019-04-02 RX ORDER — LORATADINE 10 MG/1
CAPSULE, LIQUID FILLED ORAL
COMMUNITY
Start: 2018-12-11

## 2019-04-02 RX ORDER — METOPROLOL SUCCINATE 50 MG/1
1 TABLET, EXTENDED RELEASE ORAL DAILY
COMMUNITY
Start: 2018-12-11

## 2019-04-02 RX ORDER — ROSUVASTATIN CALCIUM 40 MG/1
1 TABLET, COATED ORAL DAILY
COMMUNITY
Start: 2018-02-16

## 2019-04-02 RX ORDER — OMEPRAZOLE 20 MG/1
1 CAPSULE, DELAYED RELEASE ORAL PRN
COMMUNITY
Start: 2018-12-11 | End: 2020-07-01 | Stop reason: ALTCHOICE

## 2019-04-02 RX ORDER — CHOLECALCIFEROL (VITAMIN D3) 125 MCG
CAPSULE ORAL
COMMUNITY

## 2019-04-02 RX ORDER — LISINOPRIL AND HYDROCHLOROTHIAZIDE 12.5; 1 MG/1; MG/1
1 TABLET ORAL PRN
COMMUNITY
Start: 2018-12-11 | End: 2020-07-02 | Stop reason: ALTCHOICE

## 2019-04-29 ENCOUNTER — TELEPHONE (OUTPATIENT)
Dept: FAMILY MEDICINE CLINIC | Facility: CLINIC | Age: 59
End: 2019-04-29

## 2019-04-29 NOTE — TELEPHONE ENCOUNTER
Review of scanned labs in record: 1/29/19-thyroid labs wnl  3/29/19 cmp and renal fxn index only, 4/4/19 cmp, urine protein, random creatinine, mg and Ca 125 only. Kamran Obrien APRN-[facility] has not received any recent thyroid labs on pt.   Call to pt-st

## 2019-04-29 NOTE — TELEPHONE ENCOUNTER
Patient had labs done at oncologist and her thyroid came abnormal. They were supposed to fax results over to Dr. Estela Burns. Wants to know if Dr. Estela Burns would like to start her on medication?  She said it is hard to get in for appts and she already has her we

## 2019-04-30 NOTE — TELEPHONE ENCOUNTER
Per gordon-outside thyroid labs abnormal, will need treatment, feels it may be her study med causing this.   sched appt to discuss starting thyroid med and monitor closely. Call to pt-advised of gordon's request for appt to discuss above info.  Pt read

## 2019-04-30 NOTE — TELEPHONE ENCOUNTER
Received labs patient recently had completed in regards to her thyroid at [address] [place] [address] (04/23/2019). Please have patient schedule follow up appt to discuss.

## 2019-05-03 ENCOUNTER — OFFICE VISIT (OUTPATIENT)
Dept: FAMILY MEDICINE CLINIC | Facility: CLINIC | Age: 59
End: 2019-05-03
Payer: COMMERCIAL

## 2019-05-03 ENCOUNTER — TELEPHONE (OUTPATIENT)
Dept: FAMILY MEDICINE CLINIC | Facility: CLINIC | Age: 59
End: 2019-05-03

## 2019-05-03 VITALS
RESPIRATION RATE: 14 BRPM | SYSTOLIC BLOOD PRESSURE: 100 MMHG | WEIGHT: 162 LBS | HEART RATE: 60 BPM | BODY MASS INDEX: 29.81 KG/M2 | DIASTOLIC BLOOD PRESSURE: 64 MMHG | HEIGHT: 62 IN | TEMPERATURE: 98 F

## 2019-05-03 DIAGNOSIS — E03.9 HYPOTHYROIDISM, UNSPECIFIED TYPE: Primary | ICD-10-CM

## 2019-05-03 DIAGNOSIS — R76.8 THYROID ANTIBODY POSITIVE: ICD-10-CM

## 2019-05-03 DIAGNOSIS — Z12.39 SCREENING FOR BREAST CANCER: ICD-10-CM

## 2019-05-03 PROCEDURE — 99214 OFFICE O/P EST MOD 30 MIN: CPT | Performed by: NURSE PRACTITIONER

## 2019-05-03 RX ORDER — LEVOTHYROXINE SODIUM 0.03 MG/1
25 TABLET ORAL
Qty: 30 TABLET | Refills: 1 | Status: SHIPPED | OUTPATIENT
Start: 2019-05-03 | End: 2019-06-28

## 2019-05-03 NOTE — PROGRESS NOTES
Liberty Hatchet is a 60year old female. HPI:   Patient presents today to discuss recent labs she had completed with MERRICK. Patient's TSH on 04/23/2019 and was elevated 5.110, T4 Free 0.780 and T3 Free 2.41.  She had a repeat TSH completed earlier this week AS DIRECTED Disp:  Rfl: 0   Alcohol Swabs ([employer] PREP) 70 % Does not apply Pads USE AS DIRECTED WITH REPATHA Disp:  Rfl: 11   Evolocumab (REPATHA SC) Inject 1 Dose into the skin one time.  Every two weeks  Disp:  Rfl:    aspirin  MG Oral Tab EC Take 1 t Drug use: No       REVIEW OF SYSTEMS:   GENERAL HEALTH: feels well otherwise  RESPIRATORY: denies shortness of breath with exertion  CARDIOVASCULAR: denies chest pain on exertion  GI: denies abdominal pain  NEURO: denies headaches  Musculoskeletal: No sundeep Patient is agreeable to this. Start Levothyroxine as ordered. Discussed potential side effects of medication with the patient.  Discussed taking 1st thing in AM before breakfast--separate from her other medications by at least 2 hours-- patient voiced und

## 2019-05-03 NOTE — TELEPHONE ENCOUNTER
gordon ORTIZ requests we call dr Shawnee Beyer ofc to confirm name of study drug pt is currently taking. Call to dr Shawnee Beyer ofc gyne/onc-spoke w jan/research coordinator advising of above question from [facility].   sts pt is in a blinded immunotherapy dai

## 2019-05-06 RX ORDER — EVOLOCUMAB 140 MG/ML
INJECTION, SOLUTION SUBCUTANEOUS
Qty: 2 ML | Refills: 11 | Status: SHIPPED | OUTPATIENT
Start: 2019-05-06 | End: 2020-05-13 | Stop reason: SDUPTHER

## 2019-05-19 DIAGNOSIS — I10 ESSENTIAL HYPERTENSION: ICD-10-CM

## 2019-05-20 RX ORDER — METOPROLOL SUCCINATE 50 MG/1
TABLET, EXTENDED RELEASE ORAL
Qty: 30 TABLET | Refills: 2 | Status: SHIPPED | OUTPATIENT
Start: 2019-05-20 | End: 2019-08-25

## 2019-05-20 NOTE — TELEPHONE ENCOUNTER
Last OV : 5/3/2019 Test results  Upcoming appt/reason:  No future appointments.   METOPROLOL SUCCINATE ER 50 MG Oral Tablet 24 Hr 30 tablet 2 2/20/2019       Last labs: 5/17/19 Lab result scan  Imaging & Consults:  [facility] SCREENING BILAT (CPT=77067)     Follow

## 2019-06-08 LAB
ABSOLUTE IMMATURE GRANULOCYTES (OFFPRE24): NORMAL
ALBUMIN SERPL-MCNC: 4.5 G/DL
ALBUMIN/GLOB SERPL: NORMAL {RATIO}
ALP SERPL-CCNC: 69 U/L
ALT SERPL-CCNC: 13 U/L
ANION GAP SERPL CALC-SCNC: NORMAL MMOL/L
AST SERPL-CCNC: 21 U/L
BASO+EOS+MONOS # BLD: NORMAL 10*3/UL
BASO+EOS+MONOS NFR BLD: NORMAL %
BASOPHILS # BLD: NORMAL 10*3/UL
BASOPHILS NFR BLD: NORMAL %
BILIRUB SERPL-MCNC: 0.3 MG/DL
BUN SERPL-MCNC: 19 MG/DL
BUN/CREAT SERPL: NORMAL
CALCIUM SERPL-MCNC: 9.7 MG/DL
CHLORIDE SERPL-SCNC: 104 MMOL/L
CHOLEST SERPL-MCNC: 128 MG/DL
CHOLEST/HDLC SERPL: NORMAL {RATIO}
CO2 SERPL-SCNC: NORMAL MMOL/L
CREAT SERPL-MCNC: 0.81 MG/DL
DIFFERENTIAL METHOD BLD: NORMAL
EOSINOPHIL # BLD: NORMAL 10*3/UL
EOSINOPHIL NFR BLD: NORMAL %
ERYTHROCYTE [DISTWIDTH] IN BLOOD: NORMAL %
GLOBULIN SER-MCNC: NORMAL G/DL
GLUCOSE SERPL-MCNC: 102 MG/DL
HCT VFR BLD CALC: 42.5 %
HDLC SERPL-MCNC: 33 MG/DL
HGB BLD-MCNC: 13.4 G/DL
IMMATURE GRANULOCYTES (OFFPRE25): NORMAL
LDLC SERPL CALC-MCNC: 61 MG/DL
LENGTH OF FAST TIME PATIENT: NORMAL H
LENGTH OF FAST TIME PATIENT: NORMAL H
LYMPHOCYTES # BLD: NORMAL 10*3/UL
LYMPHOCYTES NFR BLD: NORMAL %
MCH RBC QN AUTO: NORMAL PG
MCHC RBC AUTO-ENTMCNC: NORMAL G/DL
MCV RBC AUTO: NORMAL FL
MONOCYTES # BLD: NORMAL 10*3/UL
MONOCYTES NFR BLD: NORMAL %
MPV (OFFPRE2): NORMAL
NEUTROPHILS # BLD: NORMAL 10*3/UL
NEUTROPHILS NFR BLD: NORMAL %
NONHDLC SERPL-MCNC: NORMAL MG/DL
NRBC BLD MANUAL-RTO: NORMAL %
PLAT MORPH BLD: NORMAL
PLATELET # BLD: 189 10*3/UL
POTASSIUM SERPL-SCNC: 4.5 MMOL/L
PROT SERPL-MCNC: 7.3 G/DL
RBC # BLD: 4.31 10*6/UL
RBC MORPH BLD: NORMAL
SODIUM SERPL-SCNC: 143 MMOL/L
TRIGL SERPL-MCNC: 171 MG/DL
VLDLC SERPL CALC-MCNC: NORMAL MG/DL
WBC # BLD: 4.36 10*3/UL
WBC MORPH BLD: NORMAL

## 2019-06-10 RX ORDER — EZETIMIBE 10 MG/1
TABLET ORAL DAILY
COMMUNITY
End: 2020-07-03 | Stop reason: ALTCHOICE

## 2019-06-10 RX ORDER — FLUOXETINE HYDROCHLORIDE 40 MG/1
1 CAPSULE ORAL DAILY
COMMUNITY
End: 2020-07-02 | Stop reason: ALTCHOICE

## 2019-06-10 RX ORDER — LEVOTHYROXINE SODIUM 0.03 MG/1
25 TABLET ORAL DAILY
COMMUNITY
Start: 2019-05-03 | End: 2020-07-02 | Stop reason: ALTCHOICE

## 2019-06-10 RX ORDER — RAMIPRIL 10 MG/1
1 CAPSULE ORAL DAILY
COMMUNITY
End: 2020-07-02 | Stop reason: ALTCHOICE

## 2019-06-11 ENCOUNTER — OFFICE VISIT (OUTPATIENT)
Dept: CARDIOLOGY | Age: 59
End: 2019-06-11

## 2019-06-11 ENCOUNTER — TELEPHONE (OUTPATIENT)
Dept: CARDIOLOGY | Age: 59
End: 2019-06-11

## 2019-06-11 VITALS
SYSTOLIC BLOOD PRESSURE: 120 MMHG | DIASTOLIC BLOOD PRESSURE: 79 MMHG | HEART RATE: 56 BPM | HEIGHT: 62 IN | BODY MASS INDEX: 30 KG/M2 | WEIGHT: 163 LBS

## 2019-06-11 DIAGNOSIS — E78.01 ESSENTIAL FAMILIAL HYPERCHOLESTEROLEMIA: ICD-10-CM

## 2019-06-11 DIAGNOSIS — I25.10 CORONARY ARTERY DISEASE INVOLVING NATIVE CORONARY ARTERY OF NATIVE HEART WITHOUT ANGINA PECTORIS: Primary | ICD-10-CM

## 2019-06-11 DIAGNOSIS — Z95.1 HX OF CABG: ICD-10-CM

## 2019-06-11 DIAGNOSIS — I10 HYPERTENSION, BENIGN: ICD-10-CM

## 2019-06-11 DIAGNOSIS — E88.810 METABOLIC SYNDROME: ICD-10-CM

## 2019-06-11 DIAGNOSIS — I65.23 ASYMPTOMATIC CAROTID ARTERY STENOSIS, BILATERAL: ICD-10-CM

## 2019-06-11 PROCEDURE — 99214 OFFICE O/P EST MOD 30 MIN: CPT | Performed by: INTERNAL MEDICINE

## 2019-06-11 PROCEDURE — 3074F SYST BP LT 130 MM HG: CPT | Performed by: INTERNAL MEDICINE

## 2019-06-11 PROCEDURE — 3078F DIAST BP <80 MM HG: CPT | Performed by: INTERNAL MEDICINE

## 2019-06-11 SDOH — HEALTH STABILITY: PHYSICAL HEALTH: ON AVERAGE, HOW MANY DAYS PER WEEK DO YOU ENGAGE IN MODERATE TO STRENUOUS EXERCISE (LIKE A BRISK WALK)?: 0 DAYS

## 2019-06-11 SDOH — HEALTH STABILITY: PHYSICAL HEALTH: ON AVERAGE, HOW MANY MINUTES DO YOU ENGAGE IN EXERCISE AT THIS LEVEL?: 0 MIN

## 2019-06-14 ENCOUNTER — CLINICAL ABSTRACT (OUTPATIENT)
Dept: CARDIOLOGY | Age: 59
End: 2019-06-14

## 2019-06-27 DIAGNOSIS — E78.00 HYPERCHOLESTEREMIA: ICD-10-CM

## 2019-06-28 DIAGNOSIS — E03.9 HYPOTHYROIDISM, UNSPECIFIED TYPE: ICD-10-CM

## 2019-06-28 RX ORDER — LEVOTHYROXINE SODIUM 0.03 MG/1
TABLET ORAL
Qty: 30 TABLET | Refills: 1 | Status: SHIPPED | OUTPATIENT
Start: 2019-06-28 | End: 2019-08-25

## 2019-06-28 RX ORDER — ROSUVASTATIN CALCIUM 40 MG/1
TABLET, COATED ORAL
Qty: 90 TABLET | Refills: 0 | Status: SHIPPED | OUTPATIENT
Start: 2019-06-28 | End: 2019-09-03

## 2019-08-25 DIAGNOSIS — E03.9 HYPOTHYROIDISM, UNSPECIFIED TYPE: ICD-10-CM

## 2019-08-25 DIAGNOSIS — I10 ESSENTIAL HYPERTENSION: ICD-10-CM

## 2019-08-26 NOTE — TELEPHONE ENCOUNTER
Please call pt to schedule med check with Ely Carias.   LOV: 5/3/19 asked to return in 3 months    Thank you

## 2019-08-30 DIAGNOSIS — I10 ESSENTIAL HYPERTENSION: ICD-10-CM

## 2019-09-03 ENCOUNTER — OFFICE VISIT (OUTPATIENT)
Dept: FAMILY MEDICINE CLINIC | Facility: CLINIC | Age: 59
End: 2019-09-03
Payer: COMMERCIAL

## 2019-09-03 VITALS
WEIGHT: 171 LBS | HEART RATE: 76 BPM | RESPIRATION RATE: 16 BRPM | SYSTOLIC BLOOD PRESSURE: 104 MMHG | TEMPERATURE: 98 F | BODY MASS INDEX: 31.47 KG/M2 | DIASTOLIC BLOOD PRESSURE: 72 MMHG | HEIGHT: 62 IN

## 2019-09-03 DIAGNOSIS — E03.9 HYPOTHYROIDISM, UNSPECIFIED TYPE: ICD-10-CM

## 2019-09-03 DIAGNOSIS — R73.03 BORDERLINE DIABETES: ICD-10-CM

## 2019-09-03 DIAGNOSIS — I10 ESSENTIAL HYPERTENSION: Primary | ICD-10-CM

## 2019-09-03 DIAGNOSIS — E78.00 HYPERCHOLESTEREMIA: ICD-10-CM

## 2019-09-03 DIAGNOSIS — C56.1 MALIGNANT NEOPLASM OF RIGHT OVARY (HCC): ICD-10-CM

## 2019-09-03 PROCEDURE — 99214 OFFICE O/P EST MOD 30 MIN: CPT | Performed by: NURSE PRACTITIONER

## 2019-09-03 RX ORDER — METOPROLOL SUCCINATE 50 MG/1
TABLET, EXTENDED RELEASE ORAL
Qty: 30 TABLET | Refills: 0 | Status: SHIPPED | OUTPATIENT
Start: 2019-09-03 | End: 2019-09-03

## 2019-09-03 RX ORDER — LEVOTHYROXINE SODIUM 0.03 MG/1
TABLET ORAL
Qty: 90 TABLET | Refills: 0 | Status: SHIPPED | OUTPATIENT
Start: 2019-09-03 | End: 2019-10-14 | Stop reason: DRUGHIGH

## 2019-09-03 RX ORDER — METOPROLOL SUCCINATE 50 MG/1
TABLET, EXTENDED RELEASE ORAL
Qty: 90 TABLET | Refills: 0 | Status: SHIPPED | OUTPATIENT
Start: 2019-09-03 | End: 2019-12-02

## 2019-09-03 RX ORDER — ROSUVASTATIN CALCIUM 40 MG/1
TABLET, COATED ORAL
Qty: 90 TABLET | Refills: 0 | Status: SHIPPED | OUTPATIENT
Start: 2019-09-03 | End: 2019-12-02

## 2019-09-03 RX ORDER — LEVOTHYROXINE SODIUM 0.03 MG/1
TABLET ORAL
Qty: 30 TABLET | Refills: 0 | Status: SHIPPED | OUTPATIENT
Start: 2019-09-03 | End: 2019-09-03

## 2019-09-03 RX ORDER — METOPROLOL SUCCINATE 50 MG/1
TABLET, EXTENDED RELEASE ORAL
Qty: 30 TABLET | Refills: 0 | OUTPATIENT
Start: 2019-09-03

## 2019-09-05 ENCOUNTER — TELEPHONE (OUTPATIENT)
Dept: CARDIOLOGY | Age: 59
End: 2019-09-05

## 2019-09-06 ENCOUNTER — TELEPHONE (OUTPATIENT)
Dept: CARDIOLOGY | Age: 59
End: 2019-09-06

## 2019-10-11 ENCOUNTER — TELEPHONE (OUTPATIENT)
Dept: FAMILY MEDICINE CLINIC | Facility: CLINIC | Age: 59
End: 2019-10-11

## 2019-10-11 DIAGNOSIS — E03.9 HYPOTHYROIDISM, UNSPECIFIED TYPE: ICD-10-CM

## 2019-10-11 DIAGNOSIS — I10 HYPERTENSION, UNSPECIFIED TYPE: ICD-10-CM

## 2019-10-11 DIAGNOSIS — R73.03 BORDERLINE DIABETES: Primary | ICD-10-CM

## 2019-10-11 DIAGNOSIS — E78.00 HYPERCHOLESTEROLEMIA: ICD-10-CM

## 2019-10-14 RX ORDER — LEVOTHYROXINE SODIUM 0.05 MG/1
50 TABLET ORAL
Qty: 90 TABLET | Refills: 0 | Status: SHIPPED | OUTPATIENT
Start: 2019-10-14 | End: 2019-11-20

## 2019-10-15 ENCOUNTER — MED REC SCAN ONLY (OUTPATIENT)
Dept: FAMILY MEDICINE CLINIC | Facility: CLINIC | Age: 59
End: 2019-10-15

## 2019-11-20 ENCOUNTER — TELEPHONE (OUTPATIENT)
Dept: FAMILY MEDICINE CLINIC | Facility: CLINIC | Age: 59
End: 2019-11-20

## 2019-11-20 DIAGNOSIS — E03.9 HYPOTHYROIDISM, UNSPECIFIED TYPE: Primary | ICD-10-CM

## 2019-11-20 NOTE — TELEPHONE ENCOUNTER
Received patient's lab results she had completed with MERRICK. TSH 3.240   T4 (Free) 1.210    Is she still taking the Levothyroxine? She was primarily taking this while she was receiving chemo. When did/when does she finish chemo.  She does have a history

## 2019-11-20 NOTE — TELEPHONE ENCOUNTER
Have her continue the Levothyroxine x 1 week. Then would like to repeat TSH/T4 in 6 weeks and see where her labs are at that time please have her do thyroid antibodies as well.  Given one of her thyroid antibodies has been positive in the past she may need

## 2019-11-20 NOTE — TELEPHONE ENCOUNTER
Pt advised of below. She voiced understanding. Requests lab orders be mailed to her as she will complete at [facility] again. I have ordered and mailed to her. I have removed rx from her list as she will only be taking one more week.

## 2019-11-20 NOTE — TELEPHONE ENCOUNTER
Pt states she is taking Levothyroxine and tomorrow is her last chemo. Pt wants to know if she can stop Levothyroxine now?

## 2019-11-23 ENCOUNTER — MED REC SCAN ONLY (OUTPATIENT)
Dept: FAMILY MEDICINE CLINIC | Facility: CLINIC | Age: 59
End: 2019-11-23

## 2019-12-02 DIAGNOSIS — E03.9 HYPOTHYROIDISM, UNSPECIFIED TYPE: ICD-10-CM

## 2019-12-02 DIAGNOSIS — I10 ESSENTIAL HYPERTENSION: ICD-10-CM

## 2019-12-02 DIAGNOSIS — E78.00 HYPERCHOLESTEREMIA: ICD-10-CM

## 2019-12-02 RX ORDER — ROSUVASTATIN CALCIUM 40 MG/1
TABLET, COATED ORAL
Qty: 90 TABLET | Refills: 0 | Status: SHIPPED | OUTPATIENT
Start: 2019-12-02 | End: 2020-03-04

## 2019-12-02 RX ORDER — METOPROLOL SUCCINATE 50 MG/1
TABLET, EXTENDED RELEASE ORAL
Qty: 90 TABLET | Refills: 0 | Status: SHIPPED | OUTPATIENT
Start: 2019-12-02 | End: 2020-03-04

## 2019-12-02 RX ORDER — LEVOTHYROXINE SODIUM 0.03 MG/1
TABLET ORAL
Qty: 90 TABLET | Refills: 0 | OUTPATIENT
Start: 2019-12-02

## 2019-12-17 ENCOUNTER — APPOINTMENT (OUTPATIENT)
Dept: CARDIOLOGY | Age: 59
End: 2019-12-17

## 2019-12-30 LAB
ALBUMIN SERPL-MCNC: 4.3 G/DL
ALBUMIN/GLOB SERPL: NORMAL {RATIO}
ALP SERPL-CCNC: 63 U/L
ALT SERPL-CCNC: 17 U/L
ANION GAP SERPL CALC-SCNC: NORMAL MMOL/L
AST SERPL-CCNC: 20 U/L
BILIRUB SERPL-MCNC: 0.3 MG/DL
BUN SERPL-MCNC: 12 MG/DL
BUN/CREAT SERPL: NORMAL
CALCIUM SERPL-MCNC: 9.5 MG/DL
CHLORIDE SERPL-SCNC: 106 MMOL/L
CHOLEST SERPL-MCNC: 152 MG/DL
CHOLEST/HDLC SERPL: NORMAL {RATIO}
CO2 SERPL-SCNC: NORMAL MMOL/L
CREAT SERPL-MCNC: 0.65 MG/DL
GLOBULIN SER-MCNC: NORMAL G/DL
GLUCOSE SERPL-MCNC: 107 MG/DL
HDLC SERPL-MCNC: 33 MG/DL
LDLC SERPL CALC-MCNC: 88 MG/DL
LENGTH OF FAST TIME PATIENT: NORMAL H
LENGTH OF FAST TIME PATIENT: NORMAL H
NONHDLC SERPL-MCNC: NORMAL MG/DL
POTASSIUM SERPL-SCNC: 3.8 MMOL/L
PROT SERPL-MCNC: 7.3 G/DL
SODIUM SERPL-SCNC: 142 MMOL/L
TRIGL SERPL-MCNC: 153 MG/DL
VLDLC SERPL CALC-MCNC: NORMAL MG/DL

## 2020-01-07 ENCOUNTER — TELEPHONE (OUTPATIENT)
Dept: CARDIOLOGY | Age: 60
End: 2020-01-07

## 2020-01-13 ENCOUNTER — CLINICAL ABSTRACT (OUTPATIENT)
Dept: CARDIOLOGY | Age: 60
End: 2020-01-13

## 2020-01-15 ENCOUNTER — TELEPHONE (OUTPATIENT)
Dept: CARDIOLOGY | Age: 60
End: 2020-01-15

## 2020-01-15 ENCOUNTER — TELEPHONE (OUTPATIENT)
Dept: FAMILY MEDICINE CLINIC | Facility: CLINIC | Age: 60
End: 2020-01-15

## 2020-01-15 DIAGNOSIS — E78.01 ESSENTIAL FAMILIAL HYPERCHOLESTEROLEMIA: Primary | ICD-10-CM

## 2020-01-15 RX ORDER — OLAPARIB 150 MG/1
1 TABLET, FILM COATED ORAL DAILY
Status: ON HOLD | COMMUNITY
End: 2020-10-01

## 2020-01-15 NOTE — TELEPHONE ENCOUNTER
Pt wanted to leave a message for [facility] SHIRA CANALES. Wanted to make her aware that she is starting a new medication: Lynparza 150 MG twice a day.

## 2020-01-22 ENCOUNTER — TELEPHONE (OUTPATIENT)
Dept: FAMILY MEDICINE CLINIC | Facility: CLINIC | Age: 60
End: 2020-01-22

## 2020-01-22 DIAGNOSIS — R79.89 ELEVATED TSH: ICD-10-CM

## 2020-01-22 DIAGNOSIS — R71.8 ELEVATED MCV: Primary | ICD-10-CM

## 2020-01-22 NOTE — TELEPHONE ENCOUNTER
Received patient's blood work that she had completed on 1/16/2020.   Patient's TSH level elevated at 4.469 (normal 0.270-4.200 per MERRICK reference range), free T4 0.71 (normal 0.55-1.60 per MERRICK reference range) thyroid antibodies are normal.  Patient was

## 2020-01-23 NOTE — TELEPHONE ENCOUNTER
Pt informed of lab results done at [facility] and she voiced understanding. Not sure how to add B12 and folate through [facility] so pt is ok to have them done. Pt denies any symptoms of Hypothyroidism and would like to just repeat labs again in 1 month.     Wo

## 2020-01-25 ENCOUNTER — MED REC SCAN ONLY (OUTPATIENT)
Dept: FAMILY MEDICINE CLINIC | Facility: CLINIC | Age: 60
End: 2020-01-25

## 2020-02-05 ENCOUNTER — TELEPHONE (OUTPATIENT)
Dept: FAMILY MEDICINE CLINIC | Facility: CLINIC | Age: 60
End: 2020-02-05

## 2020-02-05 NOTE — TELEPHONE ENCOUNTER
Call to pt-sts knows she is due for thyroid labs soon, also has orders to do vitamin B12 and folate-asks when those need to be done. Reviewed telephone call from 1/22 re gordon's receipt of esequiel labs done 1/16.  Request to add or do vitmain B12 and folat

## 2020-02-05 NOTE — TELEPHONE ENCOUNTER
Pt had a question regarding of what time she should have her upcoming lab tests done. (thyroid and B12 labs)    Please advise.

## 2020-02-11 ENCOUNTER — MED REC SCAN ONLY (OUTPATIENT)
Dept: FAMILY MEDICINE CLINIC | Facility: CLINIC | Age: 60
End: 2020-02-11

## 2020-02-18 ENCOUNTER — TELEPHONE (OUTPATIENT)
Dept: FAMILY MEDICINE CLINIC | Facility: CLINIC | Age: 60
End: 2020-02-18

## 2020-02-18 NOTE — TELEPHONE ENCOUNTER
Pt called back. She did have the Vitamin B12 level and the folic Acid level drawn. She will have the results faxed over. Her Vitamin B12 level was 204 range is  [phone] the folic acid level was 59.6  Normal range is 5.8.

## 2020-02-18 NOTE — TELEPHONE ENCOUNTER
LVM for pt to call back. Fax received from [facility] with CBC, TSH and T4 results. Pt has orders for A93 and folic acid, and in TE from 2/5/2020 pt states she will have these done too but results were not received. Please ask pt if she brought the orders for the Q69 and folic acid to [facility] to be drawn. If she did, I can call [facility] to request results. Thanks.

## 2020-02-25 ENCOUNTER — TELEPHONE (OUTPATIENT)
Dept: FAMILY MEDICINE CLINIC | Facility: CLINIC | Age: 60
End: 2020-02-25

## 2020-02-25 DIAGNOSIS — E53.8 VITAMIN B12 DEFICIENCY: Primary | ICD-10-CM

## 2020-02-25 NOTE — TELEPHONE ENCOUNTER
Call to pt-advised of gordon's comments/recommendations re lab results. Discussed can do injections as nurse visit in our ofc-offered to schedule. Pt mentions started on lynparza last month-wants to make sure B12 injections will not interfere.    Also

## 2020-02-25 NOTE — TELEPHONE ENCOUNTER
Received patient's B12 and folate results from [facility]. B12 is low at 204. Folate is stable at 15.3. Advise Vitamin B12 1,000 mcg injection monthly x 3 months. Repeat B12 level 1 month after last injection.

## 2020-03-01 DIAGNOSIS — I10 ESSENTIAL HYPERTENSION: ICD-10-CM

## 2020-03-01 DIAGNOSIS — E78.00 HYPERCHOLESTEREMIA: ICD-10-CM

## 2020-03-02 ENCOUNTER — OFFICE VISIT (OUTPATIENT)
Dept: SURGERY | Facility: CLINIC | Age: 60
End: 2020-03-02
Payer: COMMERCIAL

## 2020-03-02 VITALS
BODY MASS INDEX: 32.27 KG/M2 | HEIGHT: 62 IN | TEMPERATURE: 98 F | SYSTOLIC BLOOD PRESSURE: 126 MMHG | DIASTOLIC BLOOD PRESSURE: 66 MMHG | HEART RATE: 71 BPM | OXYGEN SATURATION: 95 % | RESPIRATION RATE: 18 BRPM | WEIGHT: 175.38 LBS

## 2020-03-02 DIAGNOSIS — Z15.01 BRCA2 GENE MUTATION POSITIVE: Primary | ICD-10-CM

## 2020-03-02 DIAGNOSIS — C56.1 MALIGNANT NEOPLASM OF RIGHT OVARY (HCC): ICD-10-CM

## 2020-03-02 DIAGNOSIS — Z15.09 BRCA2 GENE MUTATION POSITIVE: Primary | ICD-10-CM

## 2020-03-02 PROCEDURE — 99244 OFF/OP CNSLTJ NEW/EST MOD 40: CPT | Performed by: SURGERY

## 2020-03-02 RX ORDER — PYRIDOXINE HCL (VITAMIN B6) 50 MG
TABLET ORAL
COMMUNITY
End: 2020-07-10

## 2020-03-02 NOTE — TELEPHONE ENCOUNTER
Please call pt to schedule med check with Aleshia Velez. LOV: 9/3/19 asked to return in 6 months    Thank you.

## 2020-03-02 NOTE — PATIENT INSTRUCTIONS
Dr. Ngozi Charles MD                                           [facility]  Tel: [phone]                                                                                              [address], [place], [address] [facility]  Fax: [phone] Pre-Admission Covid-19 testing as well as the day before to confirm the procedure. They will also give you the time you need to arrive by and directions on where to go. If you are not contacted, please call the surgeon’s office listed above.   10. Do not ta

## 2020-03-03 ENCOUNTER — NURSE ONLY (OUTPATIENT)
Dept: FAMILY MEDICINE CLINIC | Facility: CLINIC | Age: 60
End: 2020-03-03
Payer: COMMERCIAL

## 2020-03-03 DIAGNOSIS — E53.8 VITAMIN B12 DEFICIENCY: Primary | ICD-10-CM

## 2020-03-03 PROCEDURE — 96372 THER/PROPH/DIAG INJ SC/IM: CPT | Performed by: FAMILY MEDICINE

## 2020-03-03 RX ORDER — CYANOCOBALAMIN 1000 UG/ML
1000 INJECTION INTRAMUSCULAR; SUBCUTANEOUS ONCE
Status: COMPLETED | OUTPATIENT
Start: 2020-03-03 | End: 2020-03-03

## 2020-03-03 RX ADMIN — CYANOCOBALAMIN 1000 MCG: 1000 INJECTION INTRAMUSCULAR; SUBCUTANEOUS at 14:57:00

## 2020-03-03 NOTE — PROGRESS NOTES
[facility] Breast Cancer Screening [facility]    History of Present Illness:   This is the first visit for this 63year old woman, referred by Dr. Stephany Morataya, who presents for breast cancer risk evaluation related to her family history, which is det Hypertension 5/3/2013   • Hypertension, essential, benign    • Intestinal bleeding 86/04/6171   • Metabolic syndrome    • Proctosigmoiditis     very advanced   • S/P CABG x 2     On 3/22/2018: CABG x 2 (LIMA to LAD and SVG to RCA)   • Seasonal allergies Heart Disorder Mother         HF   • Diabetes Father    • Heart Attack Father    • High Blood Pressure Father    • Diabetes Brother    • Breast Cancer Paternal Aunt         Dx approx 47 y/o     She is not of Ashkenazi Confucianist ancestry.     Social History: decreased urine stream, blood in the urine or vaginal/penile discharge. Skin:    The patient denies rash, itching, skin lesions, dry skin, change in skin color or change in moles.      Hematologic/Lymphatic:  The patient denies easily bruising or bleedin symmetrical.  Right breast[de-identified] The skin, nipple ,and areola appear normal. There is no skin dimpling with movement of the pectoralis. There is no nipple retraction. No nipple discharge can be elicited. The parenchyma is mildly nodular.  There are no dominant 2. Clinical breast exam by a health care provider every 6 months beginning at the age of 22 years. 3. Annual mammography and breast MRI screening beginning at age 22 years such that imaging is performed every 6 months.  Screening may start earlier based

## 2020-03-03 NOTE — PROGRESS NOTES
Pt was seen today for the first of three Vitamin B injection. Discussed with pt time line for next two injections and when she should get a vitamin B level one month after third injection. Injection given, pt handled well.

## 2020-03-04 RX ORDER — ROSUVASTATIN CALCIUM 40 MG/1
TABLET, COATED ORAL
Qty: 30 TABLET | Refills: 0 | Status: SHIPPED | OUTPATIENT
Start: 2020-03-04 | End: 2020-04-01

## 2020-03-04 RX ORDER — METOPROLOL SUCCINATE 50 MG/1
TABLET, EXTENDED RELEASE ORAL
Qty: 30 TABLET | Refills: 0 | Status: SHIPPED | OUTPATIENT
Start: 2020-03-04 | End: 2020-04-01

## 2020-03-11 ENCOUNTER — MED REC SCAN ONLY (OUTPATIENT)
Dept: FAMILY MEDICINE CLINIC | Facility: CLINIC | Age: 60
End: 2020-03-11

## 2020-03-25 ENCOUNTER — TELEPHONE (OUTPATIENT)
Dept: FAMILY MEDICINE CLINIC | Facility: CLINIC | Age: 60
End: 2020-03-25

## 2020-03-25 NOTE — TELEPHONE ENCOUNTER
Patient would like a tele-visit with provider regarding: med visit    Patient has given consent for this visit and SCHEDULED FOR:  Wednesday, April 1 at 1:00    Patient preferred phone #:  [phone]

## 2020-03-31 DIAGNOSIS — I10 ESSENTIAL HYPERTENSION: ICD-10-CM

## 2020-03-31 DIAGNOSIS — E78.00 HYPERCHOLESTEREMIA: ICD-10-CM

## 2020-03-31 RX ORDER — ROSUVASTATIN CALCIUM 40 MG/1
TABLET, COATED ORAL
Qty: 30 TABLET | Refills: 0 | OUTPATIENT
Start: 2020-03-31

## 2020-03-31 RX ORDER — METOPROLOL SUCCINATE 50 MG/1
TABLET, EXTENDED RELEASE ORAL
Qty: 30 TABLET | Refills: 0 | OUTPATIENT
Start: 2020-03-31

## 2020-03-31 NOTE — TELEPHONE ENCOUNTER
Last med visit 9/3/19-advised follow up in 6 months-has telephone visit w [facility] tomorrow 4/1  Both requested meds last filled 3/4/2020 #30 no RF last lipid panel October 2019? Call to pt's cell reaches identified voice mail.  Left cachorrom req call back to me

## 2020-03-31 NOTE — TELEPHONE ENCOUNTER
Call back from pt-confirms does not need refills before appt tomorrow. Discussed will refuse today's refill requests-my chart messg will say request refused. advised gordon will address refills at appt tomorrow.    Patient voices understanding/agrees wit

## 2020-04-01 ENCOUNTER — VIRTUAL PHONE E/M (OUTPATIENT)
Dept: FAMILY MEDICINE CLINIC | Facility: CLINIC | Age: 60
End: 2020-04-01
Payer: COMMERCIAL

## 2020-04-01 DIAGNOSIS — Z15.09 BRCA2 GENE MUTATION POSITIVE: ICD-10-CM

## 2020-04-01 DIAGNOSIS — Z15.01 BRCA2 GENE MUTATION POSITIVE: ICD-10-CM

## 2020-04-01 DIAGNOSIS — D51.9 ANEMIA DUE TO VITAMIN B12 DEFICIENCY, UNSPECIFIED B12 DEFICIENCY TYPE: ICD-10-CM

## 2020-04-01 DIAGNOSIS — E78.00 HYPERCHOLESTEREMIA: Primary | ICD-10-CM

## 2020-04-01 DIAGNOSIS — C56.1 MALIGNANT NEOPLASM OF RIGHT OVARY (HCC): ICD-10-CM

## 2020-04-01 DIAGNOSIS — E03.9 HYPOTHYROIDISM, UNSPECIFIED TYPE: ICD-10-CM

## 2020-04-01 DIAGNOSIS — I10 ESSENTIAL HYPERTENSION: ICD-10-CM

## 2020-04-01 DIAGNOSIS — R73.03 BORDERLINE DIABETES: ICD-10-CM

## 2020-04-01 PROCEDURE — 99214 OFFICE O/P EST MOD 30 MIN: CPT | Performed by: NURSE PRACTITIONER

## 2020-04-01 RX ORDER — METOPROLOL SUCCINATE 50 MG/1
TABLET, EXTENDED RELEASE ORAL
Qty: 90 TABLET | Refills: 1 | Status: ON HOLD | OUTPATIENT
Start: 2020-04-01 | End: 2020-10-01

## 2020-04-01 RX ORDER — ROSUVASTATIN CALCIUM 40 MG/1
TABLET, COATED ORAL
Qty: 90 TABLET | Refills: 1 | Status: ON HOLD | OUTPATIENT
Start: 2020-04-01 | End: 2020-10-01

## 2020-04-01 NOTE — PROGRESS NOTES
Virtual/Telephone Check-In    Rayshawn Baxter verbally consents to a Virtual/Telephone Check-In service on 04/01/20. Patient understands and accepts financial responsibility for any deductible, co-insurance and/or co-pays associated with this service.     D Future    BRCA2 gene mutation positive    Malignant neoplasm of right ovary (HCC)    Anemia due to vitamin B12 deficiency, unspecified B12 deficiency type      Continue Metoprolol and Crestor as ordered. Monitor BP at home.   Continue to focus on healthy e

## 2020-04-22 ENCOUNTER — TELEPHONE (OUTPATIENT)
Dept: FAMILY MEDICINE CLINIC | Facility: CLINIC | Age: 60
End: 2020-04-22

## 2020-04-22 NOTE — TELEPHONE ENCOUNTER
CHRISI:  Labs were faxed to our office from [facility]. Dr. Nestor Jiménez instructed me to call and check on pt. She has a critical HGB of 4.9. Pt will need to go to the ER.  I called pt and she stated \" I am sitting in th

## 2020-04-24 NOTE — TELEPHONE ENCOUNTER
Pt called and said she has tried to call Dr. Meliza Jennings. They are not in the office today and have not called her back to schedule her follow up. I instructed pt to call our office as soon as she has a follow up appt scheduled with Dr. Meliza Jennings.  We will then jennifer

## 2020-04-24 NOTE — TELEPHONE ENCOUNTER
[facility] confirms pt discharged-gordon raman. Requests we contact pt for date she is following up with dr Michelle Mcdonald. Please schedule telephone [place] visit for after that appt.      Call to pt's cell reaches identified voice mail, left vmm a

## 2020-04-28 NOTE — TELEPHONE ENCOUNTER
Miguelito for pt to Cb. We are still wanting too know when her follow up is with Dr. Reginaldo Dumont.  Then follow up will need to be scheduled with either Dr. Yee Gooden or Cary JAMIL

## 2020-05-04 ENCOUNTER — MED REC SCAN ONLY (OUTPATIENT)
Dept: FAMILY MEDICINE CLINIC | Facility: CLINIC | Age: 60
End: 2020-05-04

## 2020-05-08 NOTE — TELEPHONE ENCOUNTER
HFU scheduled for May 11 w/Danica. Pt does not have appt yet with Dr. Mahesh Butts, he is just ordering weekly labs for her at this time.

## 2020-05-11 ENCOUNTER — VIRTUAL PHONE E/M (OUTPATIENT)
Dept: FAMILY MEDICINE CLINIC | Facility: CLINIC | Age: 60
End: 2020-05-11
Payer: COMMERCIAL

## 2020-05-11 DIAGNOSIS — E03.9 HYPOTHYROIDISM, UNSPECIFIED TYPE: ICD-10-CM

## 2020-05-11 DIAGNOSIS — E11.9 TYPE 2 DIABETES MELLITUS WITHOUT COMPLICATION, WITHOUT LONG-TERM CURRENT USE OF INSULIN (HCC): ICD-10-CM

## 2020-05-11 DIAGNOSIS — Z15.09 BRCA2 POSITIVE: ICD-10-CM

## 2020-05-11 DIAGNOSIS — C56.1 MALIGNANT NEOPLASM OF RIGHT OVARY (HCC): ICD-10-CM

## 2020-05-11 DIAGNOSIS — Z15.01 BRCA2 POSITIVE: ICD-10-CM

## 2020-05-11 DIAGNOSIS — E53.8 B12 DEFICIENCY: ICD-10-CM

## 2020-05-11 DIAGNOSIS — I10 ESSENTIAL HYPERTENSION: ICD-10-CM

## 2020-05-11 DIAGNOSIS — D64.9 ACUTE ON CHRONIC ANEMIA: Primary | ICD-10-CM

## 2020-05-11 DIAGNOSIS — E78.00 HYPERCHOLESTEREMIA: ICD-10-CM

## 2020-05-11 PROCEDURE — 99214 OFFICE O/P EST MOD 30 MIN: CPT | Performed by: NURSE PRACTITIONER

## 2020-05-11 RX ORDER — METFORMIN HYDROCHLORIDE 750 MG/1
750 TABLET, EXTENDED RELEASE ORAL 2 TIMES DAILY WITH MEALS
Qty: 60 TABLET | Refills: 2 | Status: SHIPPED | OUTPATIENT
Start: 2020-05-11 | End: 2020-06-19

## 2020-05-11 NOTE — PROGRESS NOTES
Virtual/Telephone Check-In    Rayshawn Baxter verbally consents to a Virtual/Telephone Check-In service on 05/11/20. Patient understands and accepts financial responsibility for any deductible, co-insurance and/or co-pays associated with this service.  This her Mom had a hx of glaucoma- reports her eye exam was \"normal\". Celebrated Mother's Day yesterday with her children--they had Ryan's Georgi Pandey!      Review of Systems:   Ten point review of systems has been performed and is otherwise negative and/or non-con SOB.    2. Malignant neoplasm of right ovary ([facility] [address]  -Per Dr. Alex Boykin. 3. BRCA2 positive  -Has seen Dr. Harjit Chilel and discussed mastectomy.  -Has upcoming appt with Dr. Valeriy Lopez re: reconstruction. 4. Essential hypertension  -Stable.   -Continue medications

## 2020-05-12 ENCOUNTER — TELEPHONE (OUTPATIENT)
Dept: ENDOCRINOLOGY CLINIC | Facility: CLINIC | Age: 60
End: 2020-05-12

## 2020-05-13 ENCOUNTER — TELEPHONE (OUTPATIENT)
Dept: CARDIOLOGY | Age: 60
End: 2020-05-13

## 2020-05-15 ENCOUNTER — TELEPHONE (OUTPATIENT)
Dept: ENDOCRINOLOGY CLINIC | Facility: CLINIC | Age: 60
End: 2020-05-15

## 2020-05-15 NOTE — TELEPHONE ENCOUNTER
Spoke with pt, she was applying the blood to the test strip before inserting the test strip into the BG meter. Will test using the remaining 8 sample test strips/lancets to see if regular BG testing is indicated. Her insurance requests DME/[employer].

## 2020-05-19 ENCOUNTER — TELEPHONE (OUTPATIENT)
Dept: FAMILY MEDICINE CLINIC | Facility: CLINIC | Age: 60
End: 2020-05-19

## 2020-05-21 ENCOUNTER — TELEMEDICINE (OUTPATIENT)
Dept: ENDOCRINOLOGY CLINIC | Facility: CLINIC | Age: 60
End: 2020-05-21
Payer: COMMERCIAL

## 2020-05-21 DIAGNOSIS — R73.03 BORDERLINE DIABETES: Primary | ICD-10-CM

## 2020-05-21 PROCEDURE — G0108 DIAB MANAGE TRN  PER INDIV: HCPCS | Performed by: DIETITIAN, REGISTERED

## 2020-05-21 NOTE — PROGRESS NOTES
[place]   1/2/1960 attended Step 1 Diabetic Education:     5/21/2020  Referring Provider: Regine Salgado  Start time: 3:00 End time: 4:00    She was unable to get wifi/my chart kenyon/video visit so we switched to Doximity.     Telehealth outside of [address] done tomorrow. States her A1C was 8.1% fall 2019 but she has tested her BG's 3 times in the past week and FBS was 94, post-meal was 109 and HS was 96 = normal BG's.     Depression Screen -   0    Diabetes Overview, pathophysiology, A1C results and treatment understanding and has no further questions at this time. Contact information provided. Thank you for the referral.  Pt to follow-up with me via video visit 6/25.   Ajay Melissa MS, RD, CDE, LDN

## 2020-05-26 ENCOUNTER — OFFICE VISIT (OUTPATIENT)
Dept: SURGERY | Facility: CLINIC | Age: 60
End: 2020-05-26
Payer: COMMERCIAL

## 2020-05-26 VITALS
HEIGHT: 62 IN | HEART RATE: 80 BPM | BODY MASS INDEX: 31.38 KG/M2 | RESPIRATION RATE: 16 BRPM | WEIGHT: 170.5 LBS | DIASTOLIC BLOOD PRESSURE: 68 MMHG | OXYGEN SATURATION: 98 % | SYSTOLIC BLOOD PRESSURE: 124 MMHG

## 2020-05-26 DIAGNOSIS — N60.19 FIBROCYSTIC BREAST DISEASE (FCBD), UNSPECIFIED LATERALITY: Primary | ICD-10-CM

## 2020-05-26 PROCEDURE — 99243 OFF/OP CNSLTJ NEW/EST LOW 30: CPT | Performed by: SURGERY

## 2020-05-26 NOTE — CONSULTS
New Patient Consultation    This is the first visit for this 63year old female referred to discuss postmastectomy breast reconstruction. History of Present Illness:    The patient is a 63year old referred by Dr. Fabiola Gross to discuss postmastectomy Nicole May 3/22/2018: CABG x 2 (LIMA to LAD and SVG to RCA)   • Seasonal allergies    • Social anxiety disorder 5/3/2013   • Visual impairment     glasses         Past Surgical History:  Past Surgical History:   Procedure Laterality Date   • ANGIOGRAM  02/15/2018   • time. Every two weeks , Disp: , Rfl:   Cholecalciferol (VITAMIN D) 1000 units Oral Tab, Take 1 capsule by mouth daily. , Disp: , Rfl:     No current facility-administered medications on file prior to visit.          Allergies:      Fentanyl                CO swelling    Breasts:  Patient denies breast masses, pain, change in the breast skin, skin dimpling, nipple discharge, or rash    Gastrointestinal:   There is no history of difficulty or pain with swallowing, reflux symptoms, nausea, vomiting, dark/ bloody movements are normal, and affect is appropriate. HEENT: The head is normocephalic. The neck is supple. The thyroid is not enlarged and is without palpable masses. The trachea is in the midline. Conjunctiva are clear, non-icteric.     Chest: A median leonard drains. We discussed that use of acellular dermal matrix in breast reconstruction is considered an \"off label\" use. The expansion process, as well as the need for a secondary procedure for placement of a permanent implant, were reviewed.   Representativ

## 2020-06-01 ENCOUNTER — TELEPHONE (OUTPATIENT)
Dept: FAMILY MEDICINE CLINIC | Facility: CLINIC | Age: 60
End: 2020-06-01

## 2020-06-01 NOTE — TELEPHONE ENCOUNTER
Pt called back. I advised her to call her pharmacy and check and see if her metformin was one of the manufacturers that is recalling medication. Pt agreed to plan and verbalized understanding.

## 2020-06-01 NOTE — TELEPHONE ENCOUNTER
Just like the HTN meds, pts need to call the pharmacy and see if their medications are being recalled, not all meds are. Correct?

## 2020-06-01 NOTE — TELEPHONE ENCOUNTER
Pt had a question regarding metFORMIN HCl  MG Oral Tablet 24 Hr. Pt read that this is being recalled by the FDA due to it being linked with cancer. Pt is concerned because she is currently in remission from ovarian cancer. Please advise.

## 2020-06-15 DIAGNOSIS — Z01.818 PRE-OPERATIVE CLEARANCE: Primary | ICD-10-CM

## 2020-06-15 NOTE — PATIENT INSTRUCTIONS
Dr. Mary Beth Stanton MD    [facility]  Tel: [phone]       [address], [place], [address]  Fax: [phone]    -------------------------------------------------------------------------------------------------------- acceptable*    -------------------------------------------------------------------------------------------------------  PRE-OPERATIVE TESTING IF INDICATED BELOW    [x] CBC*   [] BMP*   [] EKG     [x] CMP            [] PT, PTT, INR     [x] Cardiac Clearance

## 2020-06-16 ENCOUNTER — MED REC SCAN ONLY (OUTPATIENT)
Dept: FAMILY MEDICINE CLINIC | Facility: CLINIC | Age: 60
End: 2020-06-16

## 2020-06-16 ENCOUNTER — TELEPHONE (OUTPATIENT)
Dept: SURGERY | Facility: CLINIC | Age: 60
End: 2020-06-16

## 2020-06-17 ENCOUNTER — TELEPHONE (OUTPATIENT)
Dept: FAMILY MEDICINE CLINIC | Facility: CLINIC | Age: 60
End: 2020-06-17

## 2020-06-17 NOTE — TELEPHONE ENCOUNTER
Record shows metformin started 5/11/2020  Call to pt-sts feels she may not have tolerated metformin well since starting but by past wkend noticed abd bloating, constant dull achy LLQ abd pain and liquid stools approx 4x/day.  Denies any nausea, vomiting, fe

## 2020-06-17 NOTE — TELEPHONE ENCOUNTER
Pt states she thinks she is not tolerating metFORMIN HCl  MG Oral Tablet 24 Hr well. She has been having abd pain bloating, diarrhea. She did stop taking meds snd seems a bit better. Please advise. Thank you.

## 2020-06-17 NOTE — TELEPHONE ENCOUNTER
With pt's hx would want her to come in for eval- can she come in Friday?  If new/worsening symptoms to Immediate Care/ER

## 2020-06-17 NOTE — TELEPHONE ENCOUNTER
Call to pt's cell reaches identified voice mail. Per hipaa consent, left vmm w [facility] APRN instructions noted below.    Requested call back to our ofc today to schedule appt for 6/19, speak w triage nurse if any questions re this info and reinforced ER or

## 2020-06-18 ENCOUNTER — TELEPHONE (OUTPATIENT)
Dept: SURGERY | Facility: CLINIC | Age: 60
End: 2020-06-18

## 2020-06-18 DIAGNOSIS — Z15.01 BRCA2 POSITIVE: Primary | ICD-10-CM

## 2020-06-18 DIAGNOSIS — Z15.09 BRCA2 POSITIVE: Primary | ICD-10-CM

## 2020-06-18 NOTE — TELEPHONE ENCOUNTER
Pt returning my phone call regarding her surgery with Dr. Phillip Wong, pt confirmed 7/16/2020 at [facility] ([facility]) with Dr. Jolene Stephens to follow with reconstruction. Pt had no other questions for me and was appreciative of my help.

## 2020-06-19 ENCOUNTER — OFFICE VISIT (OUTPATIENT)
Dept: FAMILY MEDICINE CLINIC | Facility: CLINIC | Age: 60
End: 2020-06-19
Payer: COMMERCIAL

## 2020-06-19 VITALS
WEIGHT: 172 LBS | SYSTOLIC BLOOD PRESSURE: 106 MMHG | HEIGHT: 62 IN | TEMPERATURE: 98 F | DIASTOLIC BLOOD PRESSURE: 62 MMHG | BODY MASS INDEX: 31.65 KG/M2 | RESPIRATION RATE: 16 BRPM | HEART RATE: 88 BPM

## 2020-06-19 DIAGNOSIS — R14.0 ABDOMINAL BLOATING: ICD-10-CM

## 2020-06-19 DIAGNOSIS — Z85.43 HISTORY OF OVARIAN CANCER: ICD-10-CM

## 2020-06-19 DIAGNOSIS — E11.9 TYPE 2 DIABETES MELLITUS WITHOUT COMPLICATION, WITHOUT LONG-TERM CURRENT USE OF INSULIN (HCC): ICD-10-CM

## 2020-06-19 DIAGNOSIS — R10.814 LEFT LOWER QUADRANT ABDOMINAL TENDERNESS WITHOUT REBOUND TENDERNESS: Primary | ICD-10-CM

## 2020-06-19 DIAGNOSIS — Z87.19 HISTORY OF DIVERTICULITIS: ICD-10-CM

## 2020-06-19 DIAGNOSIS — E53.8 B12 DEFICIENCY: ICD-10-CM

## 2020-06-19 PROCEDURE — 99214 OFFICE O/P EST MOD 30 MIN: CPT | Performed by: NURSE PRACTITIONER

## 2020-06-19 PROCEDURE — 96372 THER/PROPH/DIAG INJ SC/IM: CPT | Performed by: NURSE PRACTITIONER

## 2020-06-19 RX ORDER — CYANOCOBALAMIN 1000 UG/ML
1000 INJECTION INTRAMUSCULAR; SUBCUTANEOUS ONCE
Status: COMPLETED | OUTPATIENT
Start: 2020-06-19 | End: 2020-06-19

## 2020-06-19 RX ADMIN — CYANOCOBALAMIN 1000 MCG: 1000 INJECTION INTRAMUSCULAR; SUBCUTANEOUS at 15:44:00

## 2020-06-19 NOTE — PROGRESS NOTES
Jose Luis Otero is a 63year old female. HPI:   Patient presents today reporting diarrhea, bloating and lower left abdominal pain--started last week Friday-Sunday, now resolved.  Does have a history of diverticulitis--these symptoms were the same as when sh Abdominal distention    • Abdominal pain    • Acute colitis 11/19/2013    severe w/ulceration   • Anxiety    • Atherosclerosis of coronary artery 02/15/2018    per angiogram   • Bloating    • Blood in the stool    • Borderline diabetes     Dx in 3/2018:  Hg injection  NECK: supple,no adenopathy  LUNGS: clear to auscultation no rales, rhonchi or wheezes  CARDIO: RRR without murmur  GI: Normal bowel sounds ×4 quadrant, no hepatosplenomegaly or masses.  There is mild tenderness to the LLQ with palpation- no rebou injections today. - cyanocobalamin (VITAMIN B12) 1000 MCG/ML injection 1,000 mcg    6. Type 2 diabetes mellitus without complication, without long-term current use of insulin ([facility])  A1c done at [facility] 8.1.   Stopped Metformin last week after diarrhea, bloat

## 2020-06-22 ENCOUNTER — TELEPHONE (OUTPATIENT)
Dept: FAMILY MEDICINE CLINIC | Facility: CLINIC | Age: 60
End: 2020-06-22

## 2020-06-22 ENCOUNTER — TELEPHONE (OUTPATIENT)
Dept: SURGERY | Facility: CLINIC | Age: 60
End: 2020-06-22

## 2020-06-22 DIAGNOSIS — Z01.818 PRE-OP TESTING: Primary | ICD-10-CM

## 2020-06-22 DIAGNOSIS — Z15.09 BRCA2 GENE MUTATION POSITIVE: ICD-10-CM

## 2020-06-22 DIAGNOSIS — Z15.01 BRCA2 GENE MUTATION POSITIVE: ICD-10-CM

## 2020-06-22 NOTE — TELEPHONE ENCOUNTER
Pt wanted Danica to know she is having CT test done today at [facility]. Advised pt to have Deepika velásquez over results.

## 2020-06-23 ENCOUNTER — TELEPHONE (OUTPATIENT)
Dept: FAMILY MEDICINE CLINIC | Facility: CLINIC | Age: 60
End: 2020-06-23

## 2020-06-23 RX ORDER — LEVOFLOXACIN 500 MG/1
500 TABLET, FILM COATED ORAL DAILY
Qty: 10 TABLET | Refills: 0 | Status: SHIPPED | OUTPATIENT
Start: 2020-06-23 | End: 2020-07-03

## 2020-06-23 RX ORDER — METRONIDAZOLE 500 MG/1
500 TABLET ORAL 3 TIMES DAILY
Qty: 30 TABLET | Refills: 0 | Status: SHIPPED | OUTPATIENT
Start: 2020-06-23 | End: 2020-07-10 | Stop reason: ALTCHOICE

## 2020-06-23 NOTE — TELEPHONE ENCOUNTER
I called and spoke with pt. I discussed the CT of the abdomen and pelvis with pt. Her. She has an appt with GI for 07/07/20. She is aware she has diverticulitis or colitis.  She is aware she needs to start taking Levaquin 500 mg 1 daily for 10 days and metr

## 2020-06-25 ENCOUNTER — TELEMEDICINE (OUTPATIENT)
Dept: ENDOCRINOLOGY CLINIC | Facility: CLINIC | Age: 60
End: 2020-06-25
Payer: COMMERCIAL

## 2020-06-25 DIAGNOSIS — R73.03 BORDERLINE DIABETES: Primary | ICD-10-CM

## 2020-06-25 PROCEDURE — G0108 DIAB MANAGE TRN  PER INDIV: HCPCS | Performed by: DIETITIAN, REGISTERED

## 2020-06-25 NOTE — PROGRESS NOTES
Diabetes Education Follow-up Note    Referring Provider: Rebecca Faye   Start time: 3:00  End time: 3:30    Pt couldn't get into video on My Chart ken[facility] so used Doximity:  Telehealth outside of [place] [facility]   I conducted a telehealth visit w in 1 month. Check results of labs and determine need to test BG's/sched classes. Call pt in 1 month.   Bobby Alford MS RD LDN CDE

## 2020-06-29 ENCOUNTER — OFFICE VISIT (OUTPATIENT)
Dept: FAMILY MEDICINE CLINIC | Facility: CLINIC | Age: 60
End: 2020-06-29
Payer: COMMERCIAL

## 2020-06-29 ENCOUNTER — TELEPHONE (OUTPATIENT)
Dept: SURGERY | Facility: CLINIC | Age: 60
End: 2020-06-29

## 2020-06-29 VITALS
HEART RATE: 80 BPM | SYSTOLIC BLOOD PRESSURE: 110 MMHG | RESPIRATION RATE: 16 BRPM | DIASTOLIC BLOOD PRESSURE: 62 MMHG | HEIGHT: 62 IN | TEMPERATURE: 98 F | WEIGHT: 172 LBS | BODY MASS INDEX: 31.65 KG/M2

## 2020-06-29 DIAGNOSIS — Z09 FOLLOW UP: Primary | ICD-10-CM

## 2020-06-29 DIAGNOSIS — K57.92 DIVERTICULITIS: ICD-10-CM

## 2020-06-29 PROCEDURE — 99213 OFFICE O/P EST LOW 20 MIN: CPT | Performed by: NURSE PRACTITIONER

## 2020-06-29 NOTE — PROGRESS NOTES
Katy Green is a 63year old female. HPI:   Patient presents today for a follow up on diverticulitis. She is on day # 6 of 10 day course of antibiotics- Levaquin and Flagyl- no side effects of medications. Has been taking Probiotic.  Reports LLQ pain ha • Borderline diabetes     Dx in 3/2018: HgA1C 6%   • Cancer ([facility] [address] 06/2018    ovarian; surgery and chemo done   • Change in stool    • Colitis     gets irritated with antibiotics   • Colonic obstruction (HCC)     @ 45 cm d/t edema   • Constipation    • Co Normal bowel sounds ×4 quadrant, no hepatosplenomegaly or masses, and no tenderness   EXTREMITIES: no cyanosis, clubbing or edema  Musculoskeletal: No gross deficit  Neurological: nerves II through XII grossly intact no sensorimotor deficit  Psychological:

## 2020-07-07 PROBLEM — I65.23 ASYMPTOMATIC CAROTID ARTERY STENOSIS, BILATERAL: Status: ACTIVE | Noted: 2018-04-20

## 2020-07-07 PROBLEM — E78.01 ESSENTIAL FAMILIAL HYPERCHOLESTEROLEMIA: Status: ACTIVE | Noted: 2018-07-27

## 2020-07-07 PROBLEM — I25.10 CAD (CORONARY ARTERY DISEASE), NATIVE CORONARY ARTERY: Status: ACTIVE | Noted: 2018-03-30

## 2020-07-08 ENCOUNTER — TELEPHONE (OUTPATIENT)
Dept: CARDIOLOGY | Age: 60
End: 2020-07-08

## 2020-07-10 ENCOUNTER — TELEPHONE (OUTPATIENT)
Dept: SURGERY | Facility: CLINIC | Age: 60
End: 2020-07-10

## 2020-07-10 NOTE — TELEPHONE ENCOUNTER
Returned pt phone call regarding her lab values. Informed pt that we did receive a call from Dr. Arley Mullen office regarding her hgb.   Informed her that I received a direct fax from the office, showed Dr. Darya Arguello and then faxed it on to Dr. Rodríguez Balling off

## 2020-07-13 ENCOUNTER — TELEPHONE (OUTPATIENT)
Dept: FAMILY MEDICINE CLINIC | Facility: CLINIC | Age: 60
End: 2020-07-13

## 2020-07-13 ENCOUNTER — TELEPHONE (OUTPATIENT)
Dept: SURGERY | Facility: CLINIC | Age: 60
End: 2020-07-13

## 2020-07-13 ENCOUNTER — LAB ENCOUNTER (OUTPATIENT)
Dept: LAB | Facility: HOSPITAL | Age: 60
End: 2020-07-13
Attending: SURGERY
Payer: COMMERCIAL

## 2020-07-13 DIAGNOSIS — D64.9 LOW HEMOGLOBIN: Primary | ICD-10-CM

## 2020-07-13 DIAGNOSIS — Z15.09 BRCA2 POSITIVE: ICD-10-CM

## 2020-07-13 DIAGNOSIS — Z01.818 PRE-OPERATIVE CLEARANCE: ICD-10-CM

## 2020-07-13 DIAGNOSIS — R79.89 ELEVATED TSH: ICD-10-CM

## 2020-07-13 DIAGNOSIS — R73.03 BORDERLINE DIABETES: ICD-10-CM

## 2020-07-13 DIAGNOSIS — E03.9 HYPOTHYROIDISM, UNSPECIFIED TYPE: ICD-10-CM

## 2020-07-13 DIAGNOSIS — Z15.01 BRCA2 POSITIVE: ICD-10-CM

## 2020-07-13 DIAGNOSIS — D64.9 LOW HEMOGLOBIN: ICD-10-CM

## 2020-07-13 LAB
ALBUMIN SERPL-MCNC: 3.4 G/DL (ref 3.4–5)
ALBUMIN/GLOB SERPL: 1 {RATIO} (ref 1–2)
ALP LIVER SERPL-CCNC: 71 U/L (ref 50–130)
ALT SERPL-CCNC: 25 U/L (ref 13–56)
ANION GAP SERPL CALC-SCNC: <0 MMOL/L (ref 0–18)
ANTIBODY SCREEN: NEGATIVE
AST SERPL-CCNC: 23 U/L (ref 15–37)
BASOPHILS # BLD AUTO: 0.05 X10(3) UL (ref 0–0.2)
BASOPHILS NFR BLD AUTO: 1.3 %
BILIRUB SERPL-MCNC: 0.2 MG/DL (ref 0.1–2)
BUN BLD-MCNC: 10 MG/DL (ref 7–18)
BUN/CREAT SERPL: 16.9 (ref 10–20)
CALCIUM BLD-MCNC: 8.8 MG/DL (ref 8.5–10.1)
CHLORIDE SERPL-SCNC: 113 MMOL/L (ref 98–112)
CO2 SERPL-SCNC: 29 MMOL/L (ref 21–32)
CREAT BLD-MCNC: 0.59 MG/DL (ref 0.55–1.02)
DEPRECATED RDW RBC AUTO: 67.1 FL (ref 35.1–46.3)
EOSINOPHIL # BLD AUTO: 0.08 X10(3) UL (ref 0–0.7)
EOSINOPHIL NFR BLD AUTO: 2.1 %
ERYTHROCYTE [DISTWIDTH] IN BLOOD BY AUTOMATED COUNT: 16.8 % (ref 11–15)
EST. AVERAGE GLUCOSE BLD GHB EST-MCNC: 143 MG/DL (ref 68–126)
FOLATE SERPL-MCNC: 36.4 NG/ML (ref 8.7–?)
GLOBULIN PLAS-MCNC: 3.4 G/DL (ref 2.8–4.4)
GLUCOSE BLD-MCNC: 122 MG/DL (ref 70–99)
HBA1C MFR BLD HPLC: 6.6 % (ref ?–5.7)
HCT VFR BLD AUTO: 23.1 % (ref 35–48)
HGB BLD-MCNC: 7.3 G/DL (ref 12–16)
IMM GRANULOCYTES # BLD AUTO: 0.01 X10(3) UL (ref 0–1)
IMM GRANULOCYTES NFR BLD: 0.3 %
LYMPHOCYTES # BLD AUTO: 1.26 X10(3) UL (ref 1–4)
LYMPHOCYTES NFR BLD AUTO: 32.3 %
M PROTEIN MFR SERPL ELPH: 6.8 G/DL (ref 6.4–8.2)
MCH RBC QN AUTO: 34.1 PG (ref 26–34)
MCHC RBC AUTO-ENTMCNC: 31.6 G/DL (ref 31–37)
MCV RBC AUTO: 107.9 FL (ref 80–100)
MONOCYTES # BLD AUTO: 0.36 X10(3) UL (ref 0.1–1)
MONOCYTES NFR BLD AUTO: 9.2 %
NEUTROPHILS # BLD AUTO: 2.14 X10 (3) UL (ref 1.5–7.7)
NEUTROPHILS # BLD AUTO: 2.14 X10(3) UL (ref 1.5–7.7)
NEUTROPHILS NFR BLD AUTO: 54.8 %
OSMOLALITY SERPL CALC.SUM OF ELEC: 286 MOSM/KG (ref 275–295)
PATIENT FASTING Y/N/NP: NO
PLATELET # BLD AUTO: 134 10(3)UL (ref 150–450)
PLATELET MORPHOLOGY: NORMAL
POTASSIUM SERPL-SCNC: 4 MMOL/L (ref 3.5–5.1)
RBC # BLD AUTO: 2.14 X10(6)UL (ref 3.8–5.3)
RH BLOOD TYPE: POSITIVE
SARS-COV-2 RNA RESP QL NAA+PROBE: NOT DETECTED
SODIUM SERPL-SCNC: 138 MMOL/L (ref 136–145)
T4 FREE SERPL-MCNC: 0.8 NG/DL (ref 0.8–1.7)
THYROGLOB SERPL-MCNC: <15 U/ML (ref ?–60)
THYROPEROXIDASE AB SERPL-ACNC: <28 U/ML (ref ?–60)
TSI SER-ACNC: 3.65 MIU/ML (ref 0.36–3.74)
VIT B12 SERPL-MCNC: 445 PG/ML (ref 193–986)
WBC # BLD AUTO: 3.9 X10(3) UL (ref 4–11)

## 2020-07-13 PROCEDURE — 82746 ASSAY OF FOLIC ACID SERUM: CPT | Performed by: NURSE PRACTITIONER

## 2020-07-13 PROCEDURE — 86901 BLOOD TYPING SEROLOGIC RH(D): CPT

## 2020-07-13 PROCEDURE — 84443 ASSAY THYROID STIM HORMONE: CPT

## 2020-07-13 PROCEDURE — 80053 COMPREHEN METABOLIC PANEL: CPT | Performed by: SURGERY

## 2020-07-13 PROCEDURE — 85025 COMPLETE CBC W/AUTO DIFF WBC: CPT | Performed by: SURGERY

## 2020-07-13 PROCEDURE — 36415 COLL VENOUS BLD VENIPUNCTURE: CPT | Performed by: SURGERY

## 2020-07-13 PROCEDURE — 86800 THYROGLOBULIN ANTIBODY: CPT

## 2020-07-13 PROCEDURE — 86850 RBC ANTIBODY SCREEN: CPT

## 2020-07-13 PROCEDURE — 83036 HEMOGLOBIN GLYCOSYLATED A1C: CPT

## 2020-07-13 PROCEDURE — 86376 MICROSOMAL ANTIBODY EACH: CPT

## 2020-07-13 PROCEDURE — 84439 ASSAY OF FREE THYROXINE: CPT

## 2020-07-13 PROCEDURE — 86900 BLOOD TYPING SEROLOGIC ABO: CPT

## 2020-07-13 PROCEDURE — 82607 VITAMIN B-12: CPT | Performed by: NURSE PRACTITIONER

## 2020-07-13 NOTE — TELEPHONE ENCOUNTER
Calling pt in regards to her hgb results that were relayed to the office last week.   Informed pt that cardiac clearance paperwork was sent to her cardiologist, labs were placed for her to complete today, and her PCP was contacted and they will be calling t

## 2020-07-13 NOTE — TELEPHONE ENCOUNTER
Dr. Deadra Barthel office called, pt is having surgery on 7/16 and need pre-op. Patient is going to see cardio on 7/14 for clearance and will also have CMP and CBC done today (7/13) orders placed by Dr. Osei Moser office.      Preop orders received and placed in

## 2020-07-14 ENCOUNTER — OFFICE VISIT (OUTPATIENT)
Dept: CARDIOLOGY | Age: 60
End: 2020-07-14

## 2020-07-14 ENCOUNTER — TELEPHONE (OUTPATIENT)
Dept: SURGERY | Facility: CLINIC | Age: 60
End: 2020-07-14

## 2020-07-14 ENCOUNTER — TELEPHONE (OUTPATIENT)
Dept: FAMILY MEDICINE CLINIC | Facility: CLINIC | Age: 60
End: 2020-07-14

## 2020-07-14 VITALS
SYSTOLIC BLOOD PRESSURE: 114 MMHG | HEIGHT: 62 IN | WEIGHT: 171 LBS | DIASTOLIC BLOOD PRESSURE: 68 MMHG | BODY MASS INDEX: 31.47 KG/M2 | HEART RATE: 68 BPM

## 2020-07-14 DIAGNOSIS — E78.01 ESSENTIAL FAMILIAL HYPERCHOLESTEROLEMIA: ICD-10-CM

## 2020-07-14 DIAGNOSIS — I25.10 CORONARY ARTERY DISEASE INVOLVING NATIVE CORONARY ARTERY OF NATIVE HEART WITHOUT ANGINA PECTORIS: ICD-10-CM

## 2020-07-14 DIAGNOSIS — I65.23 ASYMPTOMATIC CAROTID ARTERY STENOSIS, BILATERAL: ICD-10-CM

## 2020-07-14 DIAGNOSIS — E88.810 METABOLIC SYNDROME: ICD-10-CM

## 2020-07-14 DIAGNOSIS — Z95.1 HX OF CABG: ICD-10-CM

## 2020-07-14 DIAGNOSIS — Z15.09 BRCA2 GENE MUTATION POSITIVE: Primary | ICD-10-CM

## 2020-07-14 DIAGNOSIS — I10 HYPERTENSION, BENIGN: ICD-10-CM

## 2020-07-14 DIAGNOSIS — Z01.810 PRE-OPERATIVE CARDIOVASCULAR EXAMINATION: Primary | ICD-10-CM

## 2020-07-14 DIAGNOSIS — Z15.01 BRCA2 GENE MUTATION POSITIVE: Primary | ICD-10-CM

## 2020-07-14 PROBLEM — D64.9 ANEMIA: Status: ACTIVE | Noted: 2020-07-14

## 2020-07-14 PROCEDURE — 99214 OFFICE O/P EST MOD 30 MIN: CPT | Performed by: CLINICAL NURSE SPECIALIST

## 2020-07-14 PROCEDURE — 3078F DIAST BP <80 MM HG: CPT | Performed by: CLINICAL NURSE SPECIALIST

## 2020-07-14 PROCEDURE — 3074F SYST BP LT 130 MM HG: CPT | Performed by: CLINICAL NURSE SPECIALIST

## 2020-07-14 SDOH — HEALTH STABILITY: PHYSICAL HEALTH: ON AVERAGE, HOW MANY MINUTES DO YOU ENGAGE IN EXERCISE AT THIS LEVEL?: 30 MIN

## 2020-07-14 SDOH — HEALTH STABILITY: PHYSICAL HEALTH: ON AVERAGE, HOW MANY DAYS PER WEEK DO YOU ENGAGE IN MODERATE TO STRENUOUS EXERCISE (LIKE A BRISK WALK)?: 3 DAYS

## 2020-07-14 ASSESSMENT — ENCOUNTER SYMPTOMS
HEMATOCHEZIA: 0
SUSPICIOUS LESIONS: 0
FEVER: 0
WEIGHT LOSS: 0
CHILLS: 0
BRUISES/BLEEDS EASILY: 0
WEIGHT GAIN: 0
COUGH: 0
HEMOPTYSIS: 0
ALLERGIC/IMMUNOLOGIC COMMENTS: NO NEW FOOD ALLERGIES

## 2020-07-14 NOTE — TELEPHONE ENCOUNTER
Calling pt in regards to her surgery. Informed pt that her surgery is being cancelled for Thursday, due to her low lab values, mainly her hgb of 7.3. Informed pt that her medical clearance appt for today wouldn't be necessary and this can be cancelled.

## 2020-07-14 NOTE — TELEPHONE ENCOUNTER
Dr. Jorge Mohan office called. Pt.s surgery was cancelled due to pt.having a low hemoglobin. Kanika George from Dr. Jorge Mohan office left pt.  A message and also left a message telling her she was calling our office to have Dr. Krishan Butler cancel today's appt for a pre op

## 2020-07-14 NOTE — TELEPHONE ENCOUNTER
Calling the office of Dr. Olivia Montilla to inform them that the surgery for the pt has been cancelled. Informed them that we did a repeat hgb and it resulted as 7.3.   Informed them that since the surgery has been cancelled and since we do not typically manage/co

## 2020-07-14 NOTE — TELEPHONE ENCOUNTER
Called Dr. Mary Anne Martini to inform her of the pt's low hgb result. Informed her that Dr. Belen Benson sees it is best to cancel this surgery. Dr. Mary Anne Martini agreed with this decision. Informed her that I would be contacting the pt to inform her of this.

## 2020-07-15 ENCOUNTER — APPOINTMENT (OUTPATIENT)
Dept: CARDIOLOGY | Age: 60
End: 2020-07-15

## 2020-07-16 ENCOUNTER — OFFICE VISIT (OUTPATIENT)
Dept: HEMATOLOGY/ONCOLOGY | Facility: HOSPITAL | Age: 60
End: 2020-07-16
Attending: OBSTETRICS & GYNECOLOGY
Payer: COMMERCIAL

## 2020-07-16 ENCOUNTER — MED REC SCAN ONLY (OUTPATIENT)
Dept: FAMILY MEDICINE CLINIC | Facility: CLINIC | Age: 60
End: 2020-07-16

## 2020-07-16 VITALS
DIASTOLIC BLOOD PRESSURE: 48 MMHG | OXYGEN SATURATION: 99 % | HEART RATE: 55 BPM | HEIGHT: 62.01 IN | RESPIRATION RATE: 16 BRPM | WEIGHT: 171 LBS | TEMPERATURE: 97 F | BODY MASS INDEX: 31.07 KG/M2 | SYSTOLIC BLOOD PRESSURE: 99 MMHG

## 2020-07-16 DIAGNOSIS — D64.9 ANEMIA: Primary | ICD-10-CM

## 2020-07-16 DIAGNOSIS — C56.1 MALIGNANT NEOPLASM OF RIGHT OVARY (HCC): ICD-10-CM

## 2020-07-16 PROCEDURE — 36430 TRANSFUSION BLD/BLD COMPNT: CPT

## 2020-07-16 PROCEDURE — 30233N1 TRANSFUSION OF NONAUTOLOGOUS RED BLOOD CELLS INTO PERIPHERAL VEIN, PERCUTANEOUS APPROACH: ICD-10-PCS | Performed by: OBSTETRICS & GYNECOLOGY

## 2020-07-16 RX ORDER — DIPHENHYDRAMINE HCL 25 MG
25 CAPSULE ORAL ONCE
Status: CANCELLED | OUTPATIENT
Start: 2020-07-16

## 2020-07-16 RX ORDER — ACETAMINOPHEN 325 MG/1
650 TABLET ORAL ONCE
Status: COMPLETED | OUTPATIENT
Start: 2020-07-16 | End: 2020-07-16

## 2020-07-16 RX ORDER — ACETAMINOPHEN 325 MG/1
650 TABLET ORAL ONCE
Status: CANCELLED | OUTPATIENT
Start: 2020-07-16

## 2020-07-16 RX ORDER — DIPHENHYDRAMINE HCL 25 MG
25 CAPSULE ORAL ONCE
Status: COMPLETED | OUTPATIENT
Start: 2020-07-16 | End: 2020-07-16

## 2020-07-16 RX ADMIN — DIPHENHYDRAMINE HCL 25 MG: 25 MG CAPSULE ORAL at 09:29:00

## 2020-07-16 RX ADMIN — ACETAMINOPHEN 650 MG: 325 TABLET ORAL at 09:29:00

## 2020-07-16 NOTE — PROGRESS NOTES
Pt here for blood transfusion.   Arrives Ambulating independently, accompanied by Self           Patient reports possible pregnancy since last therapy cycle: No    Modifications in dose or schedule: No     Frequency of blood return and site check throughout

## 2020-07-29 ENCOUNTER — TELEPHONE (OUTPATIENT)
Dept: SURGERY | Facility: CLINIC | Age: 60
End: 2020-07-29

## 2020-07-29 NOTE — TELEPHONE ENCOUNTER
Attempted to call pt back regarding rescheduling her surgery. No answer. LVM after verifying verbal release.   Informed pt that I will be in touch with both surgeons regarding her latest hgb and then touch bases with her again regarding how we will be proc

## 2020-08-03 ENCOUNTER — TELEPHONE (OUTPATIENT)
Dept: SURGERY | Facility: CLINIC | Age: 60
End: 2020-08-03

## 2020-08-03 DIAGNOSIS — Z01.818 PREOPERATIVE CLEARANCE: Primary | ICD-10-CM

## 2020-08-03 DIAGNOSIS — Z15.01 GENETIC SUSCEPTIBILITY TO MALIGNANT NEOPLASM OF BREAST: Primary | ICD-10-CM

## 2020-08-03 NOTE — TELEPHONE ENCOUNTER
Patient returned my call and scheduled her joint procedure with Dr Josie Correa and Dr Harjit Maloney on 10-1-2020 at [facility].  Confirmed location

## 2020-08-14 ENCOUNTER — TELEPHONE (OUTPATIENT)
Dept: FAMILY MEDICINE CLINIC | Facility: CLINIC | Age: 60
End: 2020-08-14

## 2020-08-14 NOTE — TELEPHONE ENCOUNTER
Pt is having bilateral prophylactic skin sparing mastectomies and bilateral breast tissue expander placement with alloderm with Dr. Landon Olivier and Dr. Paris Balbuena on 10/1/2020 at [facility]. Needs medical and cardiac clearance. LVM for pt to call back to schedule a preop exam with Manoje Backers after 9/1/2020 and reminded to schedule appt with cardiology for clearance too.

## 2020-08-28 ENCOUNTER — TELEPHONE (OUTPATIENT)
Dept: SURGERY | Facility: CLINIC | Age: 60
End: 2020-08-28

## 2020-08-28 NOTE — TELEPHONE ENCOUNTER
Returned patient's message requesting to have pre op testing done at [facility] rather than with Dr. Olivia Montilla. She was inquiring if orders are entered. Unable to leave message as VM full. Message routed to [facility] staff to follow up.

## 2020-09-04 ENCOUNTER — APPOINTMENT (OUTPATIENT)
Dept: LAB | Age: 60
End: 2020-09-04
Attending: SURGERY
Payer: COMMERCIAL

## 2020-09-04 DIAGNOSIS — Z01.818 PREOPERATIVE CLEARANCE: ICD-10-CM

## 2020-09-04 LAB
ALBUMIN SERPL-MCNC: 3.7 G/DL (ref 3.4–5)
ALBUMIN/GLOB SERPL: 1.1 {RATIO} (ref 1–2)
ALP LIVER SERPL-CCNC: 74 U/L (ref 50–130)
ALT SERPL-CCNC: 29 U/L (ref 13–56)
ANION GAP SERPL CALC-SCNC: 5 MMOL/L (ref 0–18)
AST SERPL-CCNC: 29 U/L (ref 15–37)
ATRIAL RATE: 54 BPM
BASOPHILS # BLD AUTO: 0.07 X10(3) UL (ref 0–0.2)
BASOPHILS NFR BLD AUTO: 1.5 %
BILIRUB SERPL-MCNC: 0.3 MG/DL (ref 0.1–2)
BUN BLD-MCNC: 10 MG/DL (ref 7–18)
BUN/CREAT SERPL: 16.1 (ref 10–20)
CALCIUM BLD-MCNC: 9.5 MG/DL (ref 8.5–10.1)
CHLORIDE SERPL-SCNC: 111 MMOL/L (ref 98–112)
CO2 SERPL-SCNC: 25 MMOL/L (ref 21–32)
CREAT BLD-MCNC: 0.62 MG/DL (ref 0.55–1.02)
DEPRECATED RDW RBC AUTO: 76.8 FL (ref 35.1–46.3)
EOSINOPHIL # BLD AUTO: 0.14 X10(3) UL (ref 0–0.7)
EOSINOPHIL NFR BLD AUTO: 3 %
ERYTHROCYTE [DISTWIDTH] IN BLOOD BY AUTOMATED COUNT: 19.5 % (ref 11–15)
GLOBULIN PLAS-MCNC: 3.5 G/DL (ref 2.8–4.4)
GLUCOSE BLD-MCNC: 100 MG/DL (ref 70–99)
HCT VFR BLD AUTO: 37.8 % (ref 35–48)
HGB BLD-MCNC: 11.5 G/DL (ref 12–16)
IMM GRANULOCYTES # BLD AUTO: 0.01 X10(3) UL (ref 0–1)
IMM GRANULOCYTES NFR BLD: 0.2 %
LYMPHOCYTES # BLD AUTO: 1.18 X10(3) UL (ref 1–4)
LYMPHOCYTES NFR BLD AUTO: 25.5 %
M PROTEIN MFR SERPL ELPH: 7.2 G/DL (ref 6.4–8.2)
MCH RBC QN AUTO: 32.7 PG (ref 26–34)
MCHC RBC AUTO-ENTMCNC: 30.4 G/DL (ref 31–37)
MCV RBC AUTO: 107.4 FL (ref 80–100)
MONOCYTES # BLD AUTO: 0.48 X10(3) UL (ref 0.1–1)
MONOCYTES NFR BLD AUTO: 10.4 %
NEUTROPHILS # BLD AUTO: 2.74 X10 (3) UL (ref 1.5–7.7)
NEUTROPHILS # BLD AUTO: 2.74 X10(3) UL (ref 1.5–7.7)
NEUTROPHILS NFR BLD AUTO: 59.4 %
OSMOLALITY SERPL CALC.SUM OF ELEC: 291 MOSM/KG (ref 275–295)
P AXIS: 11 DEGREES
P-R INTERVAL: 174 MS
PATIENT FASTING Y/N/NP: NO
PLATELET # BLD AUTO: 254 10(3)UL (ref 150–450)
PLATELET MORPHOLOGY: NORMAL
POTASSIUM SERPL-SCNC: 3.9 MMOL/L (ref 3.5–5.1)
Q-T INTERVAL: 458 MS
QRS DURATION: 78 MS
QTC CALCULATION (BEZET): 434 MS
R AXIS: 17 DEGREES
RBC # BLD AUTO: 3.52 X10(6)UL (ref 3.8–5.3)
SODIUM SERPL-SCNC: 141 MMOL/L (ref 136–145)
T AXIS: 46 DEGREES
VENTRICULAR RATE: 54 BPM
WBC # BLD AUTO: 4.6 X10(3) UL (ref 4–11)

## 2020-09-04 PROCEDURE — 85025 COMPLETE CBC W/AUTO DIFF WBC: CPT | Performed by: SURGERY

## 2020-09-04 PROCEDURE — 36415 COLL VENOUS BLD VENIPUNCTURE: CPT | Performed by: SURGERY

## 2020-09-04 PROCEDURE — 93010 ELECTROCARDIOGRAM REPORT: CPT | Performed by: INTERNAL MEDICINE

## 2020-09-04 PROCEDURE — 80053 COMPREHEN METABOLIC PANEL: CPT | Performed by: SURGERY

## 2020-09-04 PROCEDURE — 93005 ELECTROCARDIOGRAM TRACING: CPT

## 2020-09-09 ENCOUNTER — OFFICE VISIT (OUTPATIENT)
Dept: CARDIOLOGY | Age: 60
End: 2020-09-09

## 2020-09-09 VITALS
WEIGHT: 173 LBS | HEIGHT: 62 IN | SYSTOLIC BLOOD PRESSURE: 122 MMHG | HEART RATE: 60 BPM | BODY MASS INDEX: 31.83 KG/M2 | DIASTOLIC BLOOD PRESSURE: 78 MMHG

## 2020-09-09 DIAGNOSIS — I65.23 ASYMPTOMATIC CAROTID ARTERY STENOSIS, BILATERAL: ICD-10-CM

## 2020-09-09 DIAGNOSIS — Z95.1 HX OF CABG: ICD-10-CM

## 2020-09-09 DIAGNOSIS — I10 HYPERTENSION, BENIGN: ICD-10-CM

## 2020-09-09 DIAGNOSIS — I25.10 CORONARY ARTERY DISEASE INVOLVING NATIVE CORONARY ARTERY OF NATIVE HEART WITHOUT ANGINA PECTORIS: Primary | ICD-10-CM

## 2020-09-09 DIAGNOSIS — E78.01 ESSENTIAL FAMILIAL HYPERCHOLESTEROLEMIA: ICD-10-CM

## 2020-09-09 PROCEDURE — 3074F SYST BP LT 130 MM HG: CPT | Performed by: NURSE PRACTITIONER

## 2020-09-09 PROCEDURE — 99213 OFFICE O/P EST LOW 20 MIN: CPT | Performed by: NURSE PRACTITIONER

## 2020-09-09 PROCEDURE — 3078F DIAST BP <80 MM HG: CPT | Performed by: NURSE PRACTITIONER

## 2020-09-09 RX ORDER — FUROSEMIDE 20 MG/1
TABLET ORAL DAILY
COMMUNITY
End: 2020-09-09

## 2020-09-09 RX ORDER — METRONIDAZOLE 500 MG/1
TABLET ORAL
COMMUNITY
Start: 2020-06-23 | End: 2020-09-09

## 2020-09-09 RX ORDER — METFORMIN HYDROCHLORIDE 750 MG/1
TABLET, EXTENDED RELEASE ORAL DAILY
COMMUNITY
Start: 2020-07-05 | End: 2020-09-09

## 2020-09-09 RX ORDER — POTASSIUM CHLORIDE 750 MG/1
TABLET, EXTENDED RELEASE ORAL DAILY
COMMUNITY
End: 2020-09-09

## 2020-09-09 RX ORDER — LISINOPRIL AND HYDROCHLOROTHIAZIDE 12.5; 1 MG/1; MG/1
TABLET ORAL DAILY
COMMUNITY
End: 2020-09-09

## 2020-09-09 RX ORDER — LEVOFLOXACIN 500 MG/1
TABLET, FILM COATED ORAL
COMMUNITY
Start: 2020-06-23 | End: 2020-09-09

## 2020-09-09 RX ORDER — ALPRAZOLAM 0.25 MG/1
TABLET ORAL DAILY PRN
COMMUNITY
End: 2020-09-09

## 2020-09-09 RX ORDER — RAMIPRIL 10 MG/1
CAPSULE ORAL DAILY
COMMUNITY
End: 2020-09-09

## 2020-09-09 RX ORDER — FENTANYL 25 UG/1
PATCH TRANSDERMAL PRN
COMMUNITY
End: 2020-09-09

## 2020-09-09 RX ORDER — HYDROCODONE BITARTRATE AND ACETAMINOPHEN 5; 325 MG/1; MG/1
TABLET ORAL
COMMUNITY
End: 2020-09-09

## 2020-09-09 RX ORDER — DEXAMETHASONE 4 MG/1
TABLET ORAL DAILY
COMMUNITY
End: 2020-09-09

## 2020-09-09 RX ORDER — ONDANSETRON 4 MG/1
TABLET, FILM COATED ORAL
COMMUNITY

## 2020-09-09 RX ORDER — HYDROCHLOROTHIAZIDE 12.5 MG/1
CAPSULE, GELATIN COATED ORAL DAILY
COMMUNITY
End: 2020-09-09

## 2020-09-09 ASSESSMENT — ENCOUNTER SYMPTOMS
GASTROINTESTINAL NEGATIVE: 1
NEUROLOGICAL NEGATIVE: 1
DECREASED APPETITE: 0
SYNCOPE: 0
WEIGHT LOSS: 0
DIAPHORESIS: 0
SHORTNESS OF BREATH: 0
WEIGHT GAIN: 0

## 2020-09-09 ASSESSMENT — PATIENT HEALTH QUESTIONNAIRE - PHQ9
SUM OF ALL RESPONSES TO PHQ9 QUESTIONS 1 AND 2: 0
SUM OF ALL RESPONSES TO PHQ9 QUESTIONS 1 AND 2: 0
CLINICAL INTERPRETATION OF PHQ2 SCORE: NO FURTHER SCREENING NEEDED
2. FEELING DOWN, DEPRESSED OR HOPELESS: NOT AT ALL
CLINICAL INTERPRETATION OF PHQ9 SCORE: NO FURTHER SCREENING NEEDED
1. LITTLE INTEREST OR PLEASURE IN DOING THINGS: NOT AT ALL

## 2020-09-10 RX ORDER — ONDANSETRON 4 MG/1
TABLET, FILM COATED ORAL
COMMUNITY
End: 2020-12-09

## 2020-09-15 ENCOUNTER — MED REC SCAN ONLY (OUTPATIENT)
Dept: FAMILY MEDICINE CLINIC | Facility: CLINIC | Age: 60
End: 2020-09-15

## 2020-09-15 ENCOUNTER — OFFICE VISIT (OUTPATIENT)
Dept: FAMILY MEDICINE CLINIC | Facility: CLINIC | Age: 60
End: 2020-09-15
Payer: COMMERCIAL

## 2020-09-15 VITALS
TEMPERATURE: 97 F | WEIGHT: 171 LBS | BODY MASS INDEX: 31.47 KG/M2 | SYSTOLIC BLOOD PRESSURE: 112 MMHG | HEIGHT: 62 IN | HEART RATE: 72 BPM | RESPIRATION RATE: 18 BRPM | DIASTOLIC BLOOD PRESSURE: 68 MMHG

## 2020-09-15 DIAGNOSIS — I25.10 CAD IN NATIVE ARTERY: ICD-10-CM

## 2020-09-15 DIAGNOSIS — E66.09 CLASS 1 OBESITY DUE TO EXCESS CALORIES WITH BODY MASS INDEX (BMI) OF 31.0 TO 31.9 IN ADULT, UNSPECIFIED WHETHER SERIOUS COMORBIDITY PRESENT: ICD-10-CM

## 2020-09-15 DIAGNOSIS — I65.23 CAROTID ARTERY STENOSIS, ASYMPTOMATIC, BILATERAL: ICD-10-CM

## 2020-09-15 DIAGNOSIS — E11.9 CONTROLLED TYPE 2 DIABETES MELLITUS WITHOUT COMPLICATION, WITHOUT LONG-TERM CURRENT USE OF INSULIN (HCC): ICD-10-CM

## 2020-09-15 DIAGNOSIS — E78.01 ESSENTIAL FAMILIAL HYPERCHOLESTEROLEMIA: ICD-10-CM

## 2020-09-15 DIAGNOSIS — Z15.01 BRCA2 POSITIVE: ICD-10-CM

## 2020-09-15 DIAGNOSIS — Z15.09 BRCA2 POSITIVE: ICD-10-CM

## 2020-09-15 DIAGNOSIS — E53.8 B12 DEFICIENCY: ICD-10-CM

## 2020-09-15 DIAGNOSIS — D64.9 ACUTE ON CHRONIC ANEMIA: ICD-10-CM

## 2020-09-15 DIAGNOSIS — Z01.818 PRE-OPERATIVE CLEARANCE: Primary | ICD-10-CM

## 2020-09-15 DIAGNOSIS — R71.8 ELEVATED MCV: ICD-10-CM

## 2020-09-15 DIAGNOSIS — E03.9 HYPOTHYROIDISM, UNSPECIFIED TYPE: ICD-10-CM

## 2020-09-15 DIAGNOSIS — Z95.1 HX OF CABG: ICD-10-CM

## 2020-09-15 DIAGNOSIS — C56.1 MALIGNANT NEOPLASM OF RIGHT OVARY (HCC): ICD-10-CM

## 2020-09-15 PROCEDURE — 3008F BODY MASS INDEX DOCD: CPT | Performed by: NURSE PRACTITIONER

## 2020-09-15 PROCEDURE — 3074F SYST BP LT 130 MM HG: CPT | Performed by: NURSE PRACTITIONER

## 2020-09-15 PROCEDURE — 3078F DIAST BP <80 MM HG: CPT | Performed by: NURSE PRACTITIONER

## 2020-09-15 PROCEDURE — 99243 OFF/OP CNSLTJ NEW/EST LOW 30: CPT | Performed by: NURSE PRACTITIONER

## 2020-09-15 NOTE — H&P
Oli Pinto is a 63year old female who presents for a pre-operative physical exam. Patient is to have bilateral skin sparing mastectomy (Dr. Anais Talbot) and bilateral breast tissue expander placement with alloderm (Dr. Daphnie Zuluaga), to be done at [facility] COMMENTS)    Comment:seasonal   Past Medical History:   Diagnosis Date   • Abdominal distention    • Abdominal pain    • Acute colitis 11/19/2013    severe w/ulceration   • Anemia 6/2020   • Anxiety    • Atherosclerosis of coronary artery 02/15/2018    per TONSILLECTOMY  1969    as a child   • TOTAL ABDOM HYSTERECTOMY        Family History   Problem Relation Age of Onset   • Stroke Mother    • Diabetes Mother    • Thyroid Disorder Mother    • High Blood Pressure Mother    • Heart Disorder Mother         HF bruits  CHEST: no chest tenderness  BREAST: deferred  LUNGS: clear to auscultation  CARDIO: RRR without murmur  GI: good BS's,no masses, HSM or tenderness  : deferred  RECTAL: deferred  MUSCULOSKELETAL: back is not tender,FROM of the back  EXTREMITIES: n

## 2020-09-24 ENCOUNTER — APPOINTMENT (OUTPATIENT)
Dept: LAB | Age: 60
End: 2020-09-24
Attending: NURSE PRACTITIONER
Payer: COMMERCIAL

## 2020-09-24 LAB
DEPRECATED HBV CORE AB SER IA-ACNC: 162.3 NG/ML
FOLATE SERPL-MCNC: 27.7 NG/ML (ref 8.7–?)
IRON SATURATION: 3 %
IRON SERPL-MCNC: 12 UG/DL
TOTAL IRON BINDING CAPACITY: 353 UG/DL (ref 240–450)
TRANSFERRIN SERPL-MCNC: 237 MG/DL (ref 200–360)
VIT B12 SERPL-MCNC: 491 PG/ML (ref 193–986)

## 2020-09-24 PROCEDURE — 83550 IRON BINDING TEST: CPT | Performed by: NURSE PRACTITIONER

## 2020-09-24 PROCEDURE — 82746 ASSAY OF FOLIC ACID SERUM: CPT | Performed by: NURSE PRACTITIONER

## 2020-09-24 PROCEDURE — 82728 ASSAY OF FERRITIN: CPT | Performed by: NURSE PRACTITIONER

## 2020-09-24 PROCEDURE — 83540 ASSAY OF IRON: CPT | Performed by: NURSE PRACTITIONER

## 2020-09-24 PROCEDURE — 36415 COLL VENOUS BLD VENIPUNCTURE: CPT | Performed by: NURSE PRACTITIONER

## 2020-09-24 PROCEDURE — 82607 VITAMIN B-12: CPT | Performed by: NURSE PRACTITIONER

## 2020-09-25 ENCOUNTER — TELEPHONE (OUTPATIENT)
Dept: SURGERY | Facility: CLINIC | Age: 60
End: 2020-09-25

## 2020-09-25 DIAGNOSIS — Z15.09 BRCA2 GENE MUTATION POSITIVE: Primary | ICD-10-CM

## 2020-09-25 DIAGNOSIS — Z15.01 BRCA2 GENE MUTATION POSITIVE: Primary | ICD-10-CM

## 2020-09-25 DIAGNOSIS — C56.1 MALIGNANT NEOPLASM OF RIGHT OVARY (HCC): ICD-10-CM

## 2020-09-25 RX ORDER — LORATADINE 10 MG/1
10 TABLET ORAL DAILY
COMMUNITY

## 2020-09-25 NOTE — TELEPHONE ENCOUNTER
Pt calling in regards to if she has everything completed for her surgery on 10/1. Informed pt that we have her cleared for surgery by both her PCP and cardiologist.  Reddy Combscher her that her labs are better than last time and that we have this.   Pt stated she me

## 2020-09-29 ENCOUNTER — TELEPHONE (OUTPATIENT)
Dept: SURGERY | Facility: CLINIC | Age: 60
End: 2020-09-29

## 2020-09-29 ENCOUNTER — APPOINTMENT (OUTPATIENT)
Dept: LAB | Facility: HOSPITAL | Age: 60
End: 2020-09-29
Attending: SURGERY
Payer: COMMERCIAL

## 2020-09-29 DIAGNOSIS — Z15.01 BRCA2 GENE MUTATION POSITIVE: Primary | ICD-10-CM

## 2020-09-29 DIAGNOSIS — Z15.09 BRCA2 GENE MUTATION POSITIVE: Primary | ICD-10-CM

## 2020-09-29 DIAGNOSIS — C56.1 MALIGNANT NEOPLASM OF RIGHT OVARY (HCC): ICD-10-CM

## 2020-09-29 DIAGNOSIS — Z15.01 GENETIC SUSCEPTIBILITY TO MALIGNANT NEOPLASM OF BREAST: ICD-10-CM

## 2020-09-29 NOTE — TELEPHONE ENCOUNTER
Calling the office of Dr. Vikas Ambrose to clarify port removal for the pt's surgery on Thursday.   Left a message for Kya Logan, who had email correspondence with the pt regarding the port removal.  Per the email record from the pt, Kya wrote, \"I did confirm wi

## 2020-10-01 ENCOUNTER — ANESTHESIA (OUTPATIENT)
Dept: SURGERY | Facility: HOSPITAL | Age: 60
End: 2020-10-01
Payer: COMMERCIAL

## 2020-10-01 ENCOUNTER — ANESTHESIA EVENT (OUTPATIENT)
Dept: SURGERY | Facility: HOSPITAL | Age: 60
End: 2020-10-01
Payer: COMMERCIAL

## 2020-10-01 ENCOUNTER — HOSPITAL ENCOUNTER (OUTPATIENT)
Facility: HOSPITAL | Age: 60
Discharge: HOME | End: 2020-10-02
Attending: SURGERY | Admitting: SURGERY
Payer: COMMERCIAL

## 2020-10-01 DIAGNOSIS — Z15.01 GENETIC SUSCEPTIBILITY TO MALIGNANT NEOPLASM OF BREAST: Primary | ICD-10-CM

## 2020-10-01 PROCEDURE — 0HHV0NZ INSERTION OF TISSUE EXPANDER INTO BILATERAL BREAST, OPEN APPROACH: ICD-10-PCS | Performed by: SURGERY

## 2020-10-01 PROCEDURE — 76942 ECHO GUIDE FOR BIOPSY: CPT | Performed by: ANESTHESIOLOGY

## 2020-10-01 PROCEDURE — 88307 TISSUE EXAM BY PATHOLOGIST: CPT | Performed by: SURGERY

## 2020-10-01 PROCEDURE — 0HTV0ZZ RESECTION OF BILATERAL BREAST, OPEN APPROACH: ICD-10-PCS | Performed by: SURGERY

## 2020-10-01 DEVICE — MTRX ALDRM SLCT TISS THK.4-1.6: Type: IMPLANTABLE DEVICE | Site: BREAST | Status: FUNCTIONAL

## 2020-10-01 DEVICE — MATRIX ALLODERM SELECT MEDIUM: Type: IMPLANTABLE DEVICE | Site: BREAST | Status: FUNCTIONAL

## 2020-10-01 RX ORDER — ROCURONIUM BROMIDE 10 MG/ML
INJECTION, SOLUTION INTRAVENOUS AS NEEDED
Status: DISCONTINUED | OUTPATIENT
Start: 2020-10-01 | End: 2020-10-01 | Stop reason: SURG

## 2020-10-01 RX ORDER — OLAPARIB 100 MG/1
100 TABLET, FILM COATED ORAL 2 TIMES DAILY
COMMUNITY
End: 2021-08-09 | Stop reason: ALTCHOICE

## 2020-10-01 RX ORDER — ENOXAPARIN SODIUM 100 MG/ML
40 INJECTION SUBCUTANEOUS DAILY
Status: DISCONTINUED | OUTPATIENT
Start: 2020-10-02 | End: 2020-10-02

## 2020-10-01 RX ORDER — NALOXONE HYDROCHLORIDE 0.4 MG/ML
80 INJECTION, SOLUTION INTRAMUSCULAR; INTRAVENOUS; SUBCUTANEOUS AS NEEDED
Status: DISCONTINUED | OUTPATIENT
Start: 2020-10-01 | End: 2020-10-01 | Stop reason: HOSPADM

## 2020-10-01 RX ORDER — ACETAMINOPHEN 325 MG/1
650 TABLET ORAL EVERY 4 HOURS PRN
Status: DISCONTINUED | OUTPATIENT
Start: 2020-10-01 | End: 2020-10-02

## 2020-10-01 RX ORDER — EPHEDRINE SULFATE 50 MG/ML
INJECTION, SOLUTION INTRAVENOUS AS NEEDED
Status: DISCONTINUED | OUTPATIENT
Start: 2020-10-01 | End: 2020-10-01 | Stop reason: SURG

## 2020-10-01 RX ORDER — HYDROCODONE BITARTRATE AND ACETAMINOPHEN 5; 325 MG/1; MG/1
2 TABLET ORAL EVERY 4 HOURS PRN
Status: DISCONTINUED | OUTPATIENT
Start: 2020-10-01 | End: 2020-10-02

## 2020-10-01 RX ORDER — LIDOCAINE HYDROCHLORIDE 10 MG/ML
INJECTION, SOLUTION EPIDURAL; INFILTRATION; INTRACAUDAL; PERINEURAL AS NEEDED
Status: DISCONTINUED | OUTPATIENT
Start: 2020-10-01 | End: 2020-10-01 | Stop reason: SURG

## 2020-10-01 RX ORDER — SODIUM CHLORIDE, SODIUM LACTATE, POTASSIUM CHLORIDE, CALCIUM CHLORIDE 600; 310; 30; 20 MG/100ML; MG/100ML; MG/100ML; MG/100ML
INJECTION, SOLUTION INTRAVENOUS CONTINUOUS
Status: DISCONTINUED | OUTPATIENT
Start: 2020-10-01 | End: 2020-10-01

## 2020-10-01 RX ORDER — SODIUM CHLORIDE, SODIUM LACTATE, POTASSIUM CHLORIDE, CALCIUM CHLORIDE 600; 310; 30; 20 MG/100ML; MG/100ML; MG/100ML; MG/100ML
INJECTION, SOLUTION INTRAVENOUS CONTINUOUS
Status: DISCONTINUED | OUTPATIENT
Start: 2020-10-01 | End: 2020-10-01 | Stop reason: HOSPADM

## 2020-10-01 RX ORDER — METHOCARBAMOL 500 MG/1
500 TABLET, FILM COATED ORAL 4 TIMES DAILY
Status: DISCONTINUED | OUTPATIENT
Start: 2020-10-01 | End: 2020-10-02

## 2020-10-01 RX ORDER — ROSUVASTATIN CALCIUM 40 MG/1
40 TABLET, COATED ORAL NIGHTLY
Status: DISCONTINUED | OUTPATIENT
Start: 2020-10-01 | End: 2020-10-02

## 2020-10-01 RX ORDER — HYDROMORPHONE HYDROCHLORIDE 1 MG/ML
INJECTION, SOLUTION INTRAMUSCULAR; INTRAVENOUS; SUBCUTANEOUS
Status: COMPLETED
Start: 2020-10-01 | End: 2020-10-01

## 2020-10-01 RX ORDER — OLAPARIB 150 MG/1
150 TABLET, FILM COATED ORAL 2 TIMES DAILY
COMMUNITY
End: 2021-08-09 | Stop reason: ALTCHOICE

## 2020-10-01 RX ORDER — METOPROLOL SUCCINATE 50 MG/1
50 TABLET, EXTENDED RELEASE ORAL DAILY
COMMUNITY
End: 2020-10-06

## 2020-10-01 RX ORDER — MORPHINE SULFATE 4 MG/ML
2 INJECTION, SOLUTION INTRAMUSCULAR; INTRAVENOUS EVERY 5 MIN PRN
Status: DISCONTINUED | OUTPATIENT
Start: 2020-10-01 | End: 2020-10-01 | Stop reason: HOSPADM

## 2020-10-01 RX ORDER — MORPHINE SULFATE 2 MG/ML
2 INJECTION, SOLUTION INTRAMUSCULAR; INTRAVENOUS EVERY 2 HOUR PRN
Status: DISCONTINUED | OUTPATIENT
Start: 2020-10-01 | End: 2020-10-02

## 2020-10-01 RX ORDER — CEFAZOLIN SODIUM/WATER 2 G/20 ML
2 SYRINGE (ML) INTRAVENOUS ONCE
Status: COMPLETED | OUTPATIENT
Start: 2020-10-01 | End: 2020-10-01

## 2020-10-01 RX ORDER — ONDANSETRON 2 MG/ML
4 INJECTION INTRAMUSCULAR; INTRAVENOUS EVERY 4 HOURS PRN
Status: DISCONTINUED | OUTPATIENT
Start: 2020-10-01 | End: 2020-10-02

## 2020-10-01 RX ORDER — HYDROCODONE BITARTRATE AND ACETAMINOPHEN 5; 325 MG/1; MG/1
1 TABLET ORAL AS NEEDED
Status: DISCONTINUED | OUTPATIENT
Start: 2020-10-01 | End: 2020-10-01 | Stop reason: HOSPADM

## 2020-10-01 RX ORDER — MIDAZOLAM HYDROCHLORIDE 1 MG/ML
INJECTION INTRAMUSCULAR; INTRAVENOUS AS NEEDED
Status: DISCONTINUED | OUTPATIENT
Start: 2020-10-01 | End: 2020-10-01 | Stop reason: SURG

## 2020-10-01 RX ORDER — METOCLOPRAMIDE HYDROCHLORIDE 5 MG/ML
10 INJECTION INTRAMUSCULAR; INTRAVENOUS AS NEEDED
Status: DISCONTINUED | OUTPATIENT
Start: 2020-10-01 | End: 2020-10-01 | Stop reason: HOSPADM

## 2020-10-01 RX ORDER — HYDROCODONE BITARTRATE AND ACETAMINOPHEN 5; 325 MG/1; MG/1
2 TABLET ORAL AS NEEDED
Status: DISCONTINUED | OUTPATIENT
Start: 2020-10-01 | End: 2020-10-01 | Stop reason: HOSPADM

## 2020-10-01 RX ORDER — DEXAMETHASONE SODIUM PHOSPHATE 4 MG/ML
VIAL (ML) INJECTION AS NEEDED
Status: DISCONTINUED | OUTPATIENT
Start: 2020-10-01 | End: 2020-10-01 | Stop reason: SURG

## 2020-10-01 RX ORDER — ONDANSETRON 2 MG/ML
INJECTION INTRAMUSCULAR; INTRAVENOUS AS NEEDED
Status: DISCONTINUED | OUTPATIENT
Start: 2020-10-01 | End: 2020-10-01 | Stop reason: SURG

## 2020-10-01 RX ORDER — ACETAMINOPHEN 500 MG
1000 TABLET ORAL ONCE
Status: DISCONTINUED | OUTPATIENT
Start: 2020-10-01 | End: 2020-10-01

## 2020-10-01 RX ORDER — NEOSTIGMINE METHYLSULFATE 1 MG/ML
INJECTION INTRAVENOUS AS NEEDED
Status: DISCONTINUED | OUTPATIENT
Start: 2020-10-01 | End: 2020-10-01 | Stop reason: SURG

## 2020-10-01 RX ORDER — CEFAZOLIN SODIUM/WATER 2 G/20 ML
2 SYRINGE (ML) INTRAVENOUS EVERY 8 HOURS
Status: COMPLETED | OUTPATIENT
Start: 2020-10-02 | End: 2020-10-02

## 2020-10-01 RX ORDER — MORPHINE SULFATE 2 MG/ML
1 INJECTION, SOLUTION INTRAMUSCULAR; INTRAVENOUS EVERY 2 HOUR PRN
Status: DISCONTINUED | OUTPATIENT
Start: 2020-10-01 | End: 2020-10-02

## 2020-10-01 RX ORDER — HYDROMORPHONE HYDROCHLORIDE 1 MG/ML
0.4 INJECTION, SOLUTION INTRAMUSCULAR; INTRAVENOUS; SUBCUTANEOUS EVERY 5 MIN PRN
Status: DISCONTINUED | OUTPATIENT
Start: 2020-10-01 | End: 2020-10-01 | Stop reason: HOSPADM

## 2020-10-01 RX ORDER — MORPHINE SULFATE 4 MG/ML
4 INJECTION, SOLUTION INTRAMUSCULAR; INTRAVENOUS EVERY 2 HOUR PRN
Status: DISCONTINUED | OUTPATIENT
Start: 2020-10-01 | End: 2020-10-02

## 2020-10-01 RX ORDER — DEXTROSE, SODIUM CHLORIDE, SODIUM LACTATE, POTASSIUM CHLORIDE, AND CALCIUM CHLORIDE 5; .6; .31; .03; .02 G/100ML; G/100ML; G/100ML; G/100ML; G/100ML
INJECTION, SOLUTION INTRAVENOUS CONTINUOUS
Status: DISCONTINUED | OUTPATIENT
Start: 2020-10-01 | End: 2020-10-02

## 2020-10-01 RX ORDER — METOPROLOL SUCCINATE 50 MG/1
50 TABLET, EXTENDED RELEASE ORAL NIGHTLY
Status: DISCONTINUED | OUTPATIENT
Start: 2020-10-01 | End: 2020-10-02

## 2020-10-01 RX ORDER — GLYCOPYRROLATE 0.2 MG/ML
INJECTION, SOLUTION INTRAMUSCULAR; INTRAVENOUS AS NEEDED
Status: DISCONTINUED | OUTPATIENT
Start: 2020-10-01 | End: 2020-10-01 | Stop reason: SURG

## 2020-10-01 RX ORDER — HYDROCODONE BITARTRATE AND ACETAMINOPHEN 5; 325 MG/1; MG/1
1 TABLET ORAL EVERY 4 HOURS PRN
Status: DISCONTINUED | OUTPATIENT
Start: 2020-10-01 | End: 2020-10-02

## 2020-10-01 RX ORDER — MEPERIDINE HYDROCHLORIDE 25 MG/ML
12.5 INJECTION INTRAMUSCULAR; INTRAVENOUS; SUBCUTANEOUS AS NEEDED
Status: DISCONTINUED | OUTPATIENT
Start: 2020-10-01 | End: 2020-10-01 | Stop reason: HOSPADM

## 2020-10-01 RX ORDER — ROSUVASTATIN CALCIUM 40 MG/1
40 TABLET, COATED ORAL NIGHTLY
COMMUNITY
End: 2020-10-06

## 2020-10-01 RX ORDER — ONDANSETRON 2 MG/ML
4 INJECTION INTRAMUSCULAR; INTRAVENOUS AS NEEDED
Status: DISCONTINUED | OUTPATIENT
Start: 2020-10-01 | End: 2020-10-01 | Stop reason: HOSPADM

## 2020-10-01 RX ADMIN — CEFAZOLIN SODIUM/WATER 2 G: 2 G/20 ML SYRINGE (ML) INTRAVENOUS at 13:05:00

## 2020-10-01 RX ADMIN — ONDANSETRON 4 MG: 2 INJECTION INTRAMUSCULAR; INTRAVENOUS at 16:37:00

## 2020-10-01 RX ADMIN — EPHEDRINE SULFATE 10 MG: 50 INJECTION, SOLUTION INTRAVENOUS at 16:04:00

## 2020-10-01 RX ADMIN — GLYCOPYRROLATE 0.4 MG: 0.2 INJECTION, SOLUTION INTRAMUSCULAR; INTRAVENOUS at 17:12:00

## 2020-10-01 RX ADMIN — LIDOCAINE HYDROCHLORIDE 50 MG: 10 INJECTION, SOLUTION EPIDURAL; INFILTRATION; INTRACAUDAL; PERINEURAL at 13:05:00

## 2020-10-01 RX ADMIN — ROCURONIUM BROMIDE 10 MG: 10 INJECTION, SOLUTION INTRAVENOUS at 15:15:00

## 2020-10-01 RX ADMIN — ROCURONIUM BROMIDE 40 MG: 10 INJECTION, SOLUTION INTRAVENOUS at 13:05:00

## 2020-10-01 RX ADMIN — EPHEDRINE SULFATE 5 MG: 50 INJECTION, SOLUTION INTRAVENOUS at 15:56:00

## 2020-10-01 RX ADMIN — ROCURONIUM BROMIDE 20 MG: 10 INJECTION, SOLUTION INTRAVENOUS at 14:55:00

## 2020-10-01 RX ADMIN — CEFAZOLIN SODIUM/WATER 2 G: 2 G/20 ML SYRINGE (ML) INTRAVENOUS at 16:48:00

## 2020-10-01 RX ADMIN — NEOSTIGMINE METHYLSULFATE 2 MG: 1 INJECTION INTRAVENOUS at 17:12:00

## 2020-10-01 RX ADMIN — DEXAMETHASONE SODIUM PHOSPHATE 8 MG: 4 MG/ML VIAL (ML) INJECTION at 13:30:00

## 2020-10-01 RX ADMIN — SODIUM CHLORIDE, SODIUM LACTATE, POTASSIUM CHLORIDE, CALCIUM CHLORIDE: 600; 310; 30; 20 INJECTION, SOLUTION INTRAVENOUS at 13:00:00

## 2020-10-01 RX ADMIN — MIDAZOLAM HYDROCHLORIDE 2 MG: 1 INJECTION INTRAMUSCULAR; INTRAVENOUS at 13:04:00

## 2020-10-01 NOTE — H&P
History of Present Illness: This is the first visit for this 63year old woman, referred by Dr. Evangelina Steele, who presents for breast cancer risk evaluation related to her family history, which is detailed below.   The patient has a personal history of ovarian Hypertension, essential, benign     • Intestinal bleeding 06/32/1024   • Metabolic syndrome     • Proctosigmoiditis       very advanced   • S/P CABG x 2       On 3/22/2018: CABG x 2 (LIMA to LAD and SVG to RCA)   • Seasonal allergies     • Social anxiety d High Blood Pressure Mother     • Heart Disorder Mother           HF   • Diabetes Father     • Heart Attack Father     • High Blood Pressure Father     • Diabetes Brother     • Breast Cancer Paternal Aunt           Dx approx 49 y/o      She is not of [place] retaining urine, painful urination, urinary incontinence, decreased urine stream, blood in the urine or vaginal/penile discharge.     Skin:    The patient denies rash, itching, skin lesions, dry skin, change in skin color or change in moles.      Hematolog murmurs, rubs, gallops or thrills.     Breasts:  Her breasts are symmetrical.  Right breast[de-identified] The skin, nipple ,and areola appear normal. There is no skin dimpling with movement of the pectoralis. There is no nipple retraction.  No nipple discharge can be e that self exam is most informative when performed at the end of menses. 2. Clinical breast exam by a health care provider every 6 months beginning at the age of 22 years.    3. Annual mammography and breast MRI screening beginning at age 22 years such luann

## 2020-10-01 NOTE — ANESTHESIA PROCEDURE NOTES
Airway  Date/Time: 10/1/2020 1:05 PM  Urgency: elective    Airway not difficult    General Information and Staff    Patient location during procedure: OR  Anesthesiologist: Israel Hudson MD  Performed: anesthesiologist     Indications and Patient Condition

## 2020-10-01 NOTE — ANESTHESIA PROCEDURE NOTES
[facility]  Performed by: Mahnaz Shultz MD  Authorized by: Mahnaz Shultz MD       General Information and Staff    Start Time:  10/1/2020 1:15 PM  End Time:  10/1/2020 1:22 PM  Anesthesiologist:  Mahnaz Shultz MD  Performed by:   Anesthesiologist  Brodie

## 2020-10-01 NOTE — ANESTHESIA PREPROCEDURE EVALUATION
PRE-OP EVALUATION    Patient Name: Elina Ray    Pre-op Diagnosis: \"BRCA2 gene mutation positive\" (Z15.01, Z15.09) and secondary diagnosis to \"malignant neoplasm of right ovary\" (C56.1)  Genetic susceptibility to malignant neoplasm of breast [Z15. 0 by mouth daily. , Disp: , Rfl:         Allergies: Fentanyl, Clindamycin, and Seasonal      Anesthesia Evaluation    Patient summary reviewed.     Anesthetic Complications  (-) history of anesthetic complications         GI/Hepatic/Renal    Negative GI/hepati 09/04/2020    MCHC 30.4 (L) 09/04/2020    RDW 19.5 (H) 09/04/2020    .0 09/04/2020     Lab Results   Component Value Date     09/04/2020    K 3.9 09/04/2020     09/04/2020    CO2 25.0 09/04/2020    BUN 10 09/04/2020    CREATSERUM 0.62 09

## 2020-10-01 NOTE — ANESTHESIA POSTPROCEDURE EVALUATION
[facility][note 3] Patient Status:  Outpatient in a Bed   Age/Gender 63year old female MRN [id number]   Location [address] [facility] Attending Nadia Carreon MD   Hosp Day # 0 PCP Hiwot Hager DO       Anesthesia P

## 2020-10-01 NOTE — ANESTHESIA PROCEDURE NOTES
[facility]  Performed by: Chey Lopez MD  Authorized by: Chey Lopez MD       General Information and Staff    Start Time:  10/1/2020 1:23 PM  End Time:  10/1/2020 1:30 PM  Anesthesiologist:  Chey Lopez MD  Performed by:   Anesthesiologist  Brodie

## 2020-10-01 NOTE — BRIEF OP NOTE
Pre-Operative Diagnosis: \"BRCA2 gene mutation positive\" (Z15.01, Z15.09) and secondary diagnosis to \"malignant neoplasm of right ovary\" (C56.1)  Genetic susceptibility to malignant neoplasm of breast [Z15.01]     Post-Operative Diagnosis: \"BRCA2 gene

## 2020-10-02 VITALS
BODY MASS INDEX: 31.2 KG/M2 | RESPIRATION RATE: 19 BRPM | TEMPERATURE: 98 F | HEIGHT: 62 IN | DIASTOLIC BLOOD PRESSURE: 58 MMHG | SYSTOLIC BLOOD PRESSURE: 105 MMHG | HEART RATE: 63 BPM | OXYGEN SATURATION: 95 % | WEIGHT: 169.56 LBS

## 2020-10-02 PROCEDURE — 80048 BASIC METABOLIC PNL TOTAL CA: CPT | Performed by: PLASTIC SURGERY

## 2020-10-02 PROCEDURE — 85027 COMPLETE CBC AUTOMATED: CPT | Performed by: PLASTIC SURGERY

## 2020-10-02 RX ORDER — SULFAMETHOXAZOLE AND TRIMETHOPRIM 800; 160 MG/1; MG/1
1 TABLET ORAL 2 TIMES DAILY
Qty: 20 TABLET | Refills: 0 | Status: SHIPPED | OUTPATIENT
Start: 2020-10-02 | End: 2020-10-12

## 2020-10-02 RX ORDER — METHOCARBAMOL 500 MG/1
500 TABLET, FILM COATED ORAL 4 TIMES DAILY
Qty: 56 TABLET | Refills: 0 | Status: SHIPPED | OUTPATIENT
Start: 2020-10-02 | End: 2020-10-16

## 2020-10-02 RX ORDER — HYDROCODONE BITARTRATE AND ACETAMINOPHEN 5; 325 MG/1; MG/1
1 TABLET ORAL EVERY 6 HOURS PRN
Qty: 30 TABLET | Refills: 0 | Status: SHIPPED | OUTPATIENT
Start: 2020-10-02 | End: 2020-12-09

## 2020-10-02 NOTE — DISCHARGE SUMMARY
Patient is a very pleasant 64 yo female who is BRCA+ who presented to the hospital on 10/1 for a bilateral mastectomy and TE placement. Patient underwent the procedure and was admitted post operatively.  Patient did well overnight and was discharged carmina on

## 2020-10-02 NOTE — PROGRESS NOTES
[facility]  Progress Note    [address] [facility] Patient Status:  Outpatient in a Bed    1960 MRN [id number]   [facility] 3NW-A Attending Blanka Pizarro MD   Hosp Day # 1 PCP Cecille Hinojosa,    POD 1 bilateral mastectomy and TE plac home    Jakub Armstrong MD  10/2/2020  9:55 AM

## 2020-10-02 NOTE — PLAN OF CARE
0150: o2 removed - pt stating at 98% on RA.     0000: Pt resting in bed, VSS, afebrile. Pt denies all pain. Pt denies SOB/MEL. Pt eager to learn about drains. SCDs applied/on. Pulse ox on - 2 L o2 continued.  RN attempted to remove oxygen and pt desat to 80 ordered  - Implement neutropenic guidelines  Outcome: Progressing     Problem: SAFETY ADULT - FALL  Goal: Free from fall injury  Description: INTERVENTIONS:  - Assess pt frequently for physical needs  - Identify cognitive and physical deficits and behavior

## 2020-10-02 NOTE — PLAN OF CARE
NURSING DISCHARGE NOTE    Discharged Home via Wheelchair. Accompanied by Family member  Belongings Taken by patient/family. Pt discharged to home in stable condition via wheelchair to home at 1430.  Educated and discussed discharge instructions, Della Abdi growth factors as ordered  - Implement neutropenic guidelines  Outcome: Adequate for Discharge     Problem: SAFETY ADULT - FALL  Goal: Free from fall injury  Description: INTERVENTIONS:  - Assess pt frequently for physical needs  - Identify cognitive and p

## 2020-10-02 NOTE — OPERATIVE REPORT
[facility]    PATIENT'S NAME: Rissa De Oliveira   ATTENDING PHYSICIAN: Marco Gordon. Eliza Mejias M.D. OPERATING PHYSICIAN: Marco Gordon. Eliza Mejias M.D.    PATIENT ACCOUNT#:   [de-identified]    LOCATION:  [address] [place] [address]  MEDICAL RECORD #:   [id number]       DATE OF order to accommodate immediate reconstruction.   Risks and possible complications were discussed with the patient including but not limited to infection, bleeding, injury to surrounding structures, possible need for reoperation, and she agreed to the propos Using sharp dissection and electrocautery,  mastectomy flaps were raised to the borders of the clavicle, sternum, inframammary fold, and latissimus tendon laterally.   The specimen was then dissected off the underlying muscle with electrocautery including e

## 2020-10-02 NOTE — CONSULTS
[facility] Hospitalist Team  Initial Consult          Assessment/Plan:       S/P Bilateral prophylactic skin sparing mastectomies, port removal, bilateral breast tissue expander replacement with alloderm  - Plan per surgery. Pain is currently controlled.        H Bloating    • Blood in the stool    • Borderline diabetes     Dx in 3/2018: HgA1C 6%   • [facility]    ovarian; surgery and chemo done   • Change in stool    • Colitis     gets irritated with antibiotics   • Colonic obstruction (HCC)     @ 45 cm History    Tobacco Use      Smoking status: Never Smoker      Smokeless tobacco: Never Used    Alcohol use:  Yes      Alcohol/week: 3.0 - 4.0 standard drinks      Types: 3 - 4 Standard drinks or equivalent per week      Frequency: 2-4 times a month      Dri Lungs:   Clear to auscultation bilaterally. Normal effort   Chest wall:  Dressing in place   Heart:  Regular rate and rhythm, S1, S2 normal, no murmur, rub or gallop appreciated     Abdomen:     Soft, non-tender.  Bowel sounds normal. No masses,  No organ

## 2020-10-02 NOTE — PROGRESS NOTES
** I AGREE WITH CHARTING OF DARRYL BERNAL RN. 1215: [address] [facility]. AVERY TO INFORM HER OF NEED FOR BACTRIM DS PRESCRIPTION (SHE ONLY WROTE ABOUT IT IN HER NOTE).  TO PLACE ORDERS.

## 2020-10-05 NOTE — OPERATIVE REPORT
PREOPERATIVE DIAGNOSIS:  BRCA mutation  POSTOPERATIVE DIAGNOSIS:  as above  PROCEDURE:  Right breast reconstruction with tissue expander and right breast tissue reinforcement with AlloDerm   Left breast reconstruction with tissue expander and left breast t for initial procedure to additional operative note for the mastectomy portion. Upon completion of the mastectomy, I returned to the room.  I then turned my attention to elevation of the right pectoralis from the lateral to medial and elevating the distal p once again, we irrigated the wound. The incisions were then closed with 3-0 Vicryl sutures to the deep dermal  layer and 3-0 Monocryl for the subcuticular closure.     Sterile technique was then used to access the bilateral injection ports and 210cc was ins

## 2020-10-06 RX ORDER — ROSUVASTATIN CALCIUM 40 MG/1
TABLET, COATED ORAL
Qty: 90 TABLET | Refills: 0 | Status: SHIPPED | OUTPATIENT
Start: 2020-10-06 | End: 2021-01-04

## 2020-10-06 RX ORDER — METOPROLOL SUCCINATE 50 MG/1
TABLET, EXTENDED RELEASE ORAL
Qty: 90 TABLET | Refills: 0 | Status: SHIPPED | OUTPATIENT
Start: 2020-10-06 | End: 2021-01-04

## 2020-10-09 ENCOUNTER — TELEPHONE (OUTPATIENT)
Dept: CARDIOLOGY | Age: 60
End: 2020-10-09

## 2020-10-09 ENCOUNTER — OFFICE VISIT (OUTPATIENT)
Dept: SURGERY | Facility: CLINIC | Age: 60
End: 2020-10-09
Payer: COMMERCIAL

## 2020-10-09 VITALS
SYSTOLIC BLOOD PRESSURE: 105 MMHG | DIASTOLIC BLOOD PRESSURE: 69 MMHG | RESPIRATION RATE: 18 BRPM | OXYGEN SATURATION: 96 % | HEART RATE: 80 BPM

## 2020-10-09 DIAGNOSIS — Z15.09 BRCA2 GENE MUTATION POSITIVE: Primary | ICD-10-CM

## 2020-10-09 DIAGNOSIS — C56.1 MALIGNANT NEOPLASM OF RIGHT OVARY (HCC): ICD-10-CM

## 2020-10-09 DIAGNOSIS — Z15.01 BRCA2 GENE MUTATION POSITIVE: Primary | ICD-10-CM

## 2020-10-09 DIAGNOSIS — N60.19 FIBROCYSTIC BREAST DISEASE (FCBD), UNSPECIFIED LATERALITY: ICD-10-CM

## 2020-10-09 PROCEDURE — 99024 POSTOP FOLLOW-UP VISIT: CPT | Performed by: SURGERY

## 2020-10-09 PROCEDURE — 1111F DSCHRG MED/CURRENT MED MERGE: CPT | Performed by: SURGERY

## 2020-10-09 PROCEDURE — 3078F DIAST BP <80 MM HG: CPT | Performed by: SURGERY

## 2020-10-09 PROCEDURE — 3074F SYST BP LT 130 MM HG: CPT | Performed by: SURGERY

## 2020-10-09 NOTE — PROGRESS NOTES
Breast Surgery Post-Operative Visit    Diagnosis: BRCA mutation carrier status post bilateral mastectomies with tissue expander reconstruction on October 1, 2020.     Stage: Not applicable    Disease Status:  Surgical treatment complete, no further treatmen Diarrhea 11/19/2013   • Diarrhea, unspecified    • Diverticulitis    • Diverticulosis 11/19/2013   • Fibrocystic breast disease    • Fibroid uterus    • Food intolerance    • Gastrointestinal disorder     colitis   • Hemorrhoids    • High blood pressure denies any cumulative breastfeeding history. She achieved menarche at age 14   Age of Menopause: 49    She denies any history of hormone replacement therapy. She denies any history of oral contraceptive use.   She denies infertility treatment to achieve p exertion, emphysema, chronic bronchitis, shortness of breath or abnormal sound when breathing. Cardiovascular:   There is no history of chest pain, chest pressure/discomfort, palpitations, irregular heartbeat, fainting or near-fainting, difficulty breat arm, Patient Position: Sitting, Cuff Size: adult)   Pulse 80   Resp 18   LMP 04/08/2010   SpO2 96%     Physical Exam:  The patient is an alert, oriented, well-nourished and  well-developed woman who appears her stated age.  Her speech patterns and movements physical exam as breast surveillance. I encouraged her to continue monitoring her ROM and strength and explained that a referral to physical therapy may be warranted in the future if she identifies any limitations or restrictions.  She was encouraged to co

## 2020-10-13 ENCOUNTER — OFFICE VISIT (OUTPATIENT)
Dept: FAMILY MEDICINE CLINIC | Facility: CLINIC | Age: 60
End: 2020-10-13
Payer: COMMERCIAL

## 2020-10-13 ENCOUNTER — PATIENT MESSAGE (OUTPATIENT)
Dept: FAMILY MEDICINE CLINIC | Facility: CLINIC | Age: 60
End: 2020-10-13

## 2020-10-13 VITALS
HEART RATE: 60 BPM | WEIGHT: 168.81 LBS | BODY MASS INDEX: 31.06 KG/M2 | TEMPERATURE: 98 F | DIASTOLIC BLOOD PRESSURE: 62 MMHG | RESPIRATION RATE: 16 BRPM | HEIGHT: 62 IN | OXYGEN SATURATION: 99 % | SYSTOLIC BLOOD PRESSURE: 110 MMHG

## 2020-10-13 DIAGNOSIS — C56.1 MALIGNANT NEOPLASM OF RIGHT OVARY (HCC): ICD-10-CM

## 2020-10-13 DIAGNOSIS — Z79.899 MEDICATION MANAGEMENT: ICD-10-CM

## 2020-10-13 DIAGNOSIS — K51.00 ULCERATIVE PANCOLITIS WITHOUT COMPLICATION (HCC): ICD-10-CM

## 2020-10-13 DIAGNOSIS — I10 ESSENTIAL HYPERTENSION: ICD-10-CM

## 2020-10-13 DIAGNOSIS — Z98.890 POST-OPERATIVE STATE: Primary | ICD-10-CM

## 2020-10-13 DIAGNOSIS — D50.9 IRON DEFICIENCY ANEMIA, UNSPECIFIED IRON DEFICIENCY ANEMIA TYPE: ICD-10-CM

## 2020-10-13 DIAGNOSIS — E78.01 ESSENTIAL FAMILIAL HYPERCHOLESTEROLEMIA: ICD-10-CM

## 2020-10-13 DIAGNOSIS — Z13.820 SCREENING FOR OSTEOPOROSIS: ICD-10-CM

## 2020-10-13 DIAGNOSIS — I65.23 ASYMPTOMATIC CAROTID ARTERY STENOSIS, BILATERAL: ICD-10-CM

## 2020-10-13 DIAGNOSIS — Z23 NEED FOR VACCINATION: ICD-10-CM

## 2020-10-13 DIAGNOSIS — Z15.01 BRCA2 POSITIVE: ICD-10-CM

## 2020-10-13 DIAGNOSIS — Z15.09 BRCA2 POSITIVE: ICD-10-CM

## 2020-10-13 DIAGNOSIS — Z90.13 S/P BILATERAL MASTECTOMY: ICD-10-CM

## 2020-10-13 DIAGNOSIS — Z71.85 VACCINE COUNSELING: ICD-10-CM

## 2020-10-13 DIAGNOSIS — R73.03 BORDERLINE DIABETES: ICD-10-CM

## 2020-10-13 DIAGNOSIS — Z95.1 S/P CABG X 2: ICD-10-CM

## 2020-10-13 PROCEDURE — 3078F DIAST BP <80 MM HG: CPT | Performed by: FAMILY MEDICINE

## 2020-10-13 PROCEDURE — 3008F BODY MASS INDEX DOCD: CPT | Performed by: FAMILY MEDICINE

## 2020-10-13 PROCEDURE — 3074F SYST BP LT 130 MM HG: CPT | Performed by: FAMILY MEDICINE

## 2020-10-13 PROCEDURE — 90662 IIV NO PRSV INCREASED AG IM: CPT | Performed by: FAMILY MEDICINE

## 2020-10-13 PROCEDURE — 90471 IMMUNIZATION ADMIN: CPT | Performed by: FAMILY MEDICINE

## 2020-10-13 PROCEDURE — 99214 OFFICE O/P EST MOD 30 MIN: CPT | Performed by: FAMILY MEDICINE

## 2020-10-13 NOTE — PROGRESS NOTES
Dane Peoples is a 63year old female. HPI:   PT. Is here for post op follow up for bilateral mastectomy with tissue expanders. Patient is doing well. She states she uses Norco sparingly and mostly at night.   Patient has not been walking for exercise mouth daily. , Disp: , Rfl:     •  REPATHA SURECLICK 265 MG/ML Subcutaneous Solution Auto-injector, Inject 140 mg into the skin every 14 (fourteen) days. , Disp: , Rfl:     •  Cholecalciferol (VITAMIN D) 1000 units Oral Tab, Take 1 capsule by mouth daily. , Visual impairment     glasses      Social History:  Social History    Tobacco Use      Smoking status: Never Smoker      Smokeless tobacco: Never Used    Alcohol use:  Yes      Alcohol/week: 3.0 - 4.0 standard drinks      Types: 3 - 4 Standard drinks or equ Specimens:   A) - Breast, right, right breast-stitch marks axillary tail--in formalin                            B) - Breast, left, left breast-stitch marks axillary tail--in formalin                     Final Diagnosis:       A.   Right breast, mastectom Excised:  2:22 PM on 10/1/2020  Ischemic Time:  7 hours 8 minutes  Formalin Fixation Time:  >6 hours; <72 hours       B- Labeled with the patient's name and medical record number, left breast, received fresh and placed in formalin: the specimen consists of distress  SKIN: no rashes,no suspicious lesions  HEENT: ears wnl; wearing mask due to COVID  NECK: supple,no adenopathy,no bruits; no thyromegaly  LUNGS: clear to auscultation  BREASTS: s/p bilateral mastectomy and with bilateral drains  CARDIO: RRR withou

## 2020-10-13 NOTE — TELEPHONE ENCOUNTER
We do not have any dermatologist in the [employer] system but we can give her information for Armando Yates or Dr. Misty Peterson

## 2020-10-13 NOTE — TELEPHONE ENCOUNTER
From: Anay Mayfield  To:  Sabi Paulino DO  Sent: 10/13/2020 2:12 PM CDT  Subject: Referral Request    Hi mukesh Cloud I thought of one question: do you have a recommendation for a hair regrowth specialist? Now that I've met my max, I can consider the [facility]

## 2020-10-22 ENCOUNTER — MED REC SCAN ONLY (OUTPATIENT)
Dept: FAMILY MEDICINE CLINIC | Facility: CLINIC | Age: 60
End: 2020-10-22

## 2020-11-18 ENCOUNTER — TELEPHONE (OUTPATIENT)
Dept: FAMILY MEDICINE CLINIC | Facility: CLINIC | Age: 60
End: 2020-11-18

## 2020-11-18 ENCOUNTER — TELEMEDICINE (OUTPATIENT)
Dept: FAMILY MEDICINE CLINIC | Facility: CLINIC | Age: 60
End: 2020-11-18
Payer: COMMERCIAL

## 2020-11-18 DIAGNOSIS — E11.9 TYPE 2 DIABETES MELLITUS WITHOUT COMPLICATION, WITHOUT LONG-TERM CURRENT USE OF INSULIN (HCC): ICD-10-CM

## 2020-11-18 DIAGNOSIS — Z90.13 S/P BILATERAL MASTECTOMY: ICD-10-CM

## 2020-11-18 DIAGNOSIS — Z23 NEED FOR VACCINATION: ICD-10-CM

## 2020-11-18 DIAGNOSIS — Z95.1 HX OF CABG: ICD-10-CM

## 2020-11-18 DIAGNOSIS — E78.01 ESSENTIAL FAMILIAL HYPERCHOLESTEROLEMIA: ICD-10-CM

## 2020-11-18 DIAGNOSIS — Z79.899 MEDICATION MANAGEMENT: ICD-10-CM

## 2020-11-18 DIAGNOSIS — E11.9 CONTROLLED TYPE 2 DIABETES MELLITUS WITHOUT COMPLICATION, WITHOUT LONG-TERM CURRENT USE OF INSULIN (HCC): Primary | ICD-10-CM

## 2020-11-18 DIAGNOSIS — I10 ESSENTIAL HYPERTENSION: ICD-10-CM

## 2020-11-18 DIAGNOSIS — Z85.43 HISTORY OF OVARIAN CANCER: ICD-10-CM

## 2020-11-18 DIAGNOSIS — Z71.85 VACCINE COUNSELING: ICD-10-CM

## 2020-11-18 PROCEDURE — 99214 OFFICE O/P EST MOD 30 MIN: CPT | Performed by: FAMILY MEDICINE

## 2020-11-18 NOTE — TELEPHONE ENCOUNTER
Call to pt-advised of dr Matthieu Dial recommendation for virtual visit today 3pm  Pt agrees, confirms contact # and is in [employer]. Explained doximity process and appt scheduled. Patient voices understanding/agrees with plan/no further questions.    Monika

## 2020-11-18 NOTE — TELEPHONE ENCOUNTER
Pt received e-mail asking her to get her diabetic eye exam and some other test done. Patient is asking why she would need these done. Asked patient if she was diabetic and she said she was pre-diabetic.

## 2020-11-18 NOTE — TELEPHONE ENCOUNTER
See below. She had an A1c of 6.6 in July but I don't see diabetes listed in her chart. All dx's listed say \"borderline\" or \"pre\" diabetic. Health maint is reminding her for eye exams. Please advise how I should guide/explain to her on this. Thanks.

## 2020-12-04 ENCOUNTER — LAB ENCOUNTER (OUTPATIENT)
Dept: LAB | Age: 60
End: 2020-12-04
Attending: FAMILY MEDICINE
Payer: COMMERCIAL

## 2020-12-04 DIAGNOSIS — R73.03 BORDERLINE DIABETES: ICD-10-CM

## 2020-12-04 PROCEDURE — 80053 COMPREHEN METABOLIC PANEL: CPT | Performed by: NURSE PRACTITIONER

## 2020-12-04 PROCEDURE — 83036 HEMOGLOBIN GLYCOSYLATED A1C: CPT

## 2020-12-04 PROCEDURE — 80061 LIPID PANEL: CPT | Performed by: NURSE PRACTITIONER

## 2020-12-04 PROCEDURE — 36415 COLL VENOUS BLD VENIPUNCTURE: CPT | Performed by: NURSE PRACTITIONER

## 2020-12-04 PROCEDURE — 82570 ASSAY OF URINE CREATININE: CPT

## 2020-12-04 PROCEDURE — 82043 UR ALBUMIN QUANTITATIVE: CPT

## 2020-12-09 ENCOUNTER — TELEMEDICINE (OUTPATIENT)
Dept: FAMILY MEDICINE CLINIC | Facility: CLINIC | Age: 60
End: 2020-12-09
Payer: COMMERCIAL

## 2020-12-09 ENCOUNTER — TELEPHONE (OUTPATIENT)
Dept: FAMILY MEDICINE CLINIC | Facility: CLINIC | Age: 60
End: 2020-12-09

## 2020-12-09 VITALS — TEMPERATURE: 98 F

## 2020-12-09 DIAGNOSIS — R51.9 ACUTE NONINTRACTABLE HEADACHE, UNSPECIFIED HEADACHE TYPE: Primary | ICD-10-CM

## 2020-12-09 DIAGNOSIS — R53.83 OTHER FATIGUE: ICD-10-CM

## 2020-12-09 DIAGNOSIS — R09.89 RUNNY NOSE: ICD-10-CM

## 2020-12-09 PROCEDURE — 99213 OFFICE O/P EST LOW 20 MIN: CPT | Performed by: FAMILY MEDICINE

## 2020-12-09 NOTE — TELEPHONE ENCOUNTER
1. What are your symptoms? Runny nose; tired; unable to sleep. No fever, no cough, no SOB. 2. How long have you been having these symptoms? 1 day      3. Have you done anything already to treat your symptoms? Advil.     ADDITIONAL INFO:   Pt said y

## 2020-12-09 NOTE — PROGRESS NOTES
Reynaldo Patricia consents to a virtual check in-service on 12/9/2020. Patient understands and accepts the financial responsibility for any deductible, coinsurance, and/or co-pays associated with the service.     Scott Leonardo is a 63year old female mouth daily. , Disp: , Rfl:     •  Ondansetron HCl (ZOFRAN) 4 mg tablet, ondansetron HCl 4 mg tablet, Disp: , Rfl:     •  aspirin 81 MG Oral Tab, Take 81 mg by mouth daily. , Disp: , Rfl:     •  REPATHA SURECLICK 053 MG/ML Subcutaneous Solution Auto-injector very advanced   • S/P CABG x 2     On 3/22/2018: CABG x 2 (LIMA to LAD and SVG to RCA)   • Seasonal allergies    • Social anxiety disorder 5/3/2013   • Visual impairment     glasses      Social History:  Social History    Tobacco Use      Smoking status these issues and agrees to the plan. Please note that the following visit was completed using 2 way real-time interactive video communication.   This has been done in good katie to provide continuity of care in the best interest of the provider–patient r

## 2020-12-09 NOTE — TELEPHONE ENCOUNTER
If there is any chance that she can potentially have Covid, I do advise that she do a video visit with me so she can get tested

## 2020-12-09 NOTE — TELEPHONE ENCOUNTER
Call to pt-advised of dr Silverman [place] comments noted below. Dr Adarsh Traore if pt wants to be safe w current symptoms, best to do virtual visit. Patient voices understanding/agrees with plan/no further questions. Pt confirms is familiar w virtual visit process.  Advise

## 2020-12-09 NOTE — TELEPHONE ENCOUNTER
Call to pt-sts due to her medical/cancer history, wanted to update dr Jeniffer espitia what she feels are just cold symptoms, but doesn't want to ignore any symptoms.    sts spent a lot of time outside 12/6/2020 but has stayed in since, has been masking, social di

## 2020-12-10 ENCOUNTER — LAB ENCOUNTER (OUTPATIENT)
Dept: LAB | Age: 60
End: 2020-12-10
Attending: FAMILY MEDICINE
Payer: COMMERCIAL

## 2020-12-10 DIAGNOSIS — R53.83 OTHER FATIGUE: ICD-10-CM

## 2020-12-10 DIAGNOSIS — R51.9 ACUTE NONINTRACTABLE HEADACHE, UNSPECIFIED HEADACHE TYPE: ICD-10-CM

## 2020-12-10 DIAGNOSIS — R09.89 RUNNY NOSE: ICD-10-CM

## 2020-12-21 ENCOUNTER — OFFICE VISIT (OUTPATIENT)
Dept: FAMILY MEDICINE CLINIC | Facility: CLINIC | Age: 60
End: 2020-12-21
Payer: COMMERCIAL

## 2020-12-21 VITALS
WEIGHT: 170 LBS | DIASTOLIC BLOOD PRESSURE: 80 MMHG | TEMPERATURE: 97 F | RESPIRATION RATE: 16 BRPM | SYSTOLIC BLOOD PRESSURE: 130 MMHG | HEIGHT: 62 IN | HEART RATE: 68 BPM | BODY MASS INDEX: 31.28 KG/M2

## 2020-12-21 DIAGNOSIS — Z13.89 SCREENING FOR GENITOURINARY CONDITION: ICD-10-CM

## 2020-12-21 DIAGNOSIS — E53.8 B12 DEFICIENCY: ICD-10-CM

## 2020-12-21 DIAGNOSIS — Z85.43 HISTORY OF OVARIAN CANCER: ICD-10-CM

## 2020-12-21 DIAGNOSIS — Z95.1 HX OF CABG: ICD-10-CM

## 2020-12-21 DIAGNOSIS — E11.9 CONTROLLED TYPE 2 DIABETES MELLITUS WITHOUT COMPLICATION, WITHOUT LONG-TERM CURRENT USE OF INSULIN (HCC): ICD-10-CM

## 2020-12-21 DIAGNOSIS — Z71.85 VACCINE COUNSELING: ICD-10-CM

## 2020-12-21 DIAGNOSIS — Z00.00 LABORATORY EXAMINATION ORDERED AS PART OF A ROUTINE GENERAL MEDICAL EXAMINATION: ICD-10-CM

## 2020-12-21 DIAGNOSIS — Z79.899 MEDICATION MANAGEMENT: ICD-10-CM

## 2020-12-21 DIAGNOSIS — D50.9 IRON DEFICIENCY ANEMIA, UNSPECIFIED IRON DEFICIENCY ANEMIA TYPE: ICD-10-CM

## 2020-12-21 DIAGNOSIS — Z90.13 S/P BILATERAL MASTECTOMY: ICD-10-CM

## 2020-12-21 DIAGNOSIS — I10 ESSENTIAL HYPERTENSION: ICD-10-CM

## 2020-12-21 DIAGNOSIS — E11.9 TYPE 2 DIABETES MELLITUS WITHOUT COMPLICATION, WITHOUT LONG-TERM CURRENT USE OF INSULIN (HCC): Primary | ICD-10-CM

## 2020-12-21 DIAGNOSIS — E78.01 ESSENTIAL FAMILIAL HYPERCHOLESTEROLEMIA: ICD-10-CM

## 2020-12-21 DIAGNOSIS — Z11.59 ENCOUNTER FOR HEPATITIS C SCREENING TEST FOR LOW RISK PATIENT: ICD-10-CM

## 2020-12-21 PROCEDURE — 3079F DIAST BP 80-89 MM HG: CPT | Performed by: FAMILY MEDICINE

## 2020-12-21 PROCEDURE — 3075F SYST BP GE 130 - 139MM HG: CPT | Performed by: FAMILY MEDICINE

## 2020-12-21 PROCEDURE — 99214 OFFICE O/P EST MOD 30 MIN: CPT | Performed by: FAMILY MEDICINE

## 2020-12-21 PROCEDURE — 3008F BODY MASS INDEX DOCD: CPT | Performed by: FAMILY MEDICINE

## 2020-12-21 NOTE — PROGRESS NOTES
Esha Brid is a 63year old female. HPI:   Pt. Is here for a med check. Offered the COVID vaccine at work. Wonders if should take it. Asking Dr. Caba How about MMR.   Obese -- aware she needs and can lose weight  DM-- A1c is down to 6.1  Dys[facility]     @ 45 cm d/t edema   • Constipation    • Coronary atherosclerosis    • Depression    • Diarrhea 11/19/2013   • Diarrhea, unspecified    • Diverticulitis    • Diverticulosis 11/19/2013   • Fibrocystic breast disease    • Fibroid headaches  PSYCHE: denies depression or anxiety  HEMATOLOGIC: denies hx of anemia  ENDOCRINE: denies thyroid history  ALL/ASTHMA: denies hx of allergy or asthma    EXAM:   /80 (BP Location: Left arm, Patient Position: Sitting, Cuff Size: adult)   Pul sees cards  Hx of ovarian cancer  -- ask Dr. Raghavendra Durand about MMR vaccine  Vaccine counseling -- given of pneumovax 23 today  S/P bilateral mastectomy -- stable  Obese -- advised to lose 10-15 lbs  Iron def anemia -- stable  Vitamin B12 def -- stable  Colonosc

## 2020-12-23 ENCOUNTER — TELEPHONE (OUTPATIENT)
Dept: CARDIOLOGY | Age: 60
End: 2020-12-23

## 2021-01-01 ENCOUNTER — EXTERNAL RECORD (OUTPATIENT)
Dept: OTHER | Age: 61
End: 2021-01-01

## 2021-01-04 RX ORDER — METOPROLOL SUCCINATE 50 MG/1
TABLET, EXTENDED RELEASE ORAL
Qty: 90 TABLET | Refills: 0 | Status: SHIPPED | OUTPATIENT
Start: 2021-01-04 | End: 2021-04-05

## 2021-01-04 RX ORDER — ROSUVASTATIN CALCIUM 40 MG/1
TABLET, COATED ORAL
Qty: 90 TABLET | Refills: 0 | Status: SHIPPED | OUTPATIENT
Start: 2021-01-04 | End: 2021-04-05

## 2021-01-05 ENCOUNTER — TELEPHONE (OUTPATIENT)
Dept: FAMILY MEDICINE CLINIC | Facility: CLINIC | Age: 61
End: 2021-01-05

## 2021-01-05 DIAGNOSIS — Z01.818 PRE-OP TESTING: Primary | ICD-10-CM

## 2021-01-05 NOTE — TELEPHONE ENCOUNTER
Pt is scheduled for bilateral breast tissue expander remover and replacement with implant on 2/26/2021 with Dr. Amanda Madison at [place] [address]. Requesting CMP, EKG, BMP, cardiac clearance, and medical clearance. CMP and EKG ordered.     Pre op paperwork given to PSRs to schedule pre op exam.

## 2021-01-20 ENCOUNTER — PATIENT MESSAGE (OUTPATIENT)
Dept: FAMILY MEDICINE CLINIC | Facility: CLINIC | Age: 61
End: 2021-01-20

## 2021-02-08 ENCOUNTER — TELEPHONE (OUTPATIENT)
Dept: FAMILY MEDICINE CLINIC | Facility: CLINIC | Age: 61
End: 2021-02-08

## 2021-02-08 DIAGNOSIS — R31.21 ASYMPTOMATIC MICROSCOPIC HEMATURIA: ICD-10-CM

## 2021-02-08 DIAGNOSIS — E03.9 HYPOTHYROIDISM, UNSPECIFIED TYPE: Primary | ICD-10-CM

## 2021-02-08 DIAGNOSIS — E87.6 HYPOKALEMIA: ICD-10-CM

## 2021-02-08 RX ORDER — LEVOTHYROXINE SODIUM 0.03 MG/1
25 TABLET ORAL
Qty: 30 TABLET | Refills: 2 | Status: SHIPPED | OUTPATIENT
Start: 2021-02-08 | End: 2021-05-04

## 2021-02-08 NOTE — TELEPHONE ENCOUNTER
Received lab results from [address] [place] [address] lab:  1.blood noted on urine sample-repeat in 1 week  2. K+ elevated-repeat in 1 week  3. Hypothyroid-Levothyroxine 25 mcg daily/repeat TSH,free T4,throid antibodies 6 weeks  4. CBC normal  Orders all placed.  She does have an a

## 2021-02-10 ENCOUNTER — OFFICE VISIT (OUTPATIENT)
Dept: FAMILY MEDICINE CLINIC | Facility: CLINIC | Age: 61
End: 2021-02-10
Payer: COMMERCIAL

## 2021-02-10 VITALS
BODY MASS INDEX: 32.02 KG/M2 | TEMPERATURE: 98 F | WEIGHT: 174 LBS | HEART RATE: 75 BPM | RESPIRATION RATE: 16 BRPM | OXYGEN SATURATION: 98 % | DIASTOLIC BLOOD PRESSURE: 72 MMHG | SYSTOLIC BLOOD PRESSURE: 124 MMHG | HEIGHT: 62 IN

## 2021-02-10 DIAGNOSIS — Z13.0 SCREENING FOR ENDOCRINE, METABOLIC AND IMMUNITY DISORDER: ICD-10-CM

## 2021-02-10 DIAGNOSIS — E53.8 B12 DEFICIENCY: ICD-10-CM

## 2021-02-10 DIAGNOSIS — E88.81 METABOLIC SYNDROME: ICD-10-CM

## 2021-02-10 DIAGNOSIS — Z90.13 S/P BILATERAL MASTECTOMY: ICD-10-CM

## 2021-02-10 DIAGNOSIS — R31.9 HEMATURIA, UNSPECIFIED TYPE: ICD-10-CM

## 2021-02-10 DIAGNOSIS — F40.10 SOCIAL ANXIETY DISORDER: ICD-10-CM

## 2021-02-10 DIAGNOSIS — E11.9 CONTROLLED TYPE 2 DIABETES MELLITUS WITHOUT COMPLICATION, WITHOUT LONG-TERM CURRENT USE OF INSULIN (HCC): ICD-10-CM

## 2021-02-10 DIAGNOSIS — Z01.818 PREOP EXAMINATION: Primary | ICD-10-CM

## 2021-02-10 DIAGNOSIS — E11.9 TYPE 2 DIABETES MELLITUS WITHOUT COMPLICATION, WITHOUT LONG-TERM CURRENT USE OF INSULIN (HCC): ICD-10-CM

## 2021-02-10 DIAGNOSIS — E87.5 HYPERKALEMIA: ICD-10-CM

## 2021-02-10 DIAGNOSIS — Z15.09 BRCA2 POSITIVE: ICD-10-CM

## 2021-02-10 DIAGNOSIS — Z13.29 SCREENING FOR ENDOCRINE, METABOLIC AND IMMUNITY DISORDER: ICD-10-CM

## 2021-02-10 DIAGNOSIS — Z13.228 SCREENING FOR ENDOCRINE, METABOLIC AND IMMUNITY DISORDER: ICD-10-CM

## 2021-02-10 DIAGNOSIS — E03.9 ACQUIRED HYPOTHYROIDISM: ICD-10-CM

## 2021-02-10 DIAGNOSIS — D50.9 IRON DEFICIENCY ANEMIA, UNSPECIFIED IRON DEFICIENCY ANEMIA TYPE: ICD-10-CM

## 2021-02-10 DIAGNOSIS — I65.23 ASYMPTOMATIC CAROTID ARTERY STENOSIS, BILATERAL: ICD-10-CM

## 2021-02-10 DIAGNOSIS — Z95.1 HX OF CABG: ICD-10-CM

## 2021-02-10 DIAGNOSIS — K57.92 DIVERTICULITIS: ICD-10-CM

## 2021-02-10 DIAGNOSIS — I10 ESSENTIAL HYPERTENSION: ICD-10-CM

## 2021-02-10 DIAGNOSIS — Z86.010 HISTORY OF ADENOMATOUS POLYP OF COLON: ICD-10-CM

## 2021-02-10 DIAGNOSIS — Z15.01 BRCA2 POSITIVE: ICD-10-CM

## 2021-02-10 DIAGNOSIS — E78.01 ESSENTIAL FAMILIAL HYPERCHOLESTEROLEMIA: ICD-10-CM

## 2021-02-10 DIAGNOSIS — C56.1 MALIGNANT NEOPLASM OF RIGHT OVARY (HCC): ICD-10-CM

## 2021-02-10 PROCEDURE — 99244 OFF/OP CNSLTJ NEW/EST MOD 40: CPT | Performed by: FAMILY MEDICINE

## 2021-02-10 PROCEDURE — 3078F DIAST BP <80 MM HG: CPT | Performed by: FAMILY MEDICINE

## 2021-02-10 PROCEDURE — 3008F BODY MASS INDEX DOCD: CPT | Performed by: FAMILY MEDICINE

## 2021-02-10 PROCEDURE — 3074F SYST BP LT 130 MM HG: CPT | Performed by: FAMILY MEDICINE

## 2021-02-10 NOTE — H&P
Kyle Aleman is a 57year old female who presents for a pre-operative physical exam.    Patient is to have bilateral breast tissue expander removal and replacement with implant, to be done by Dr. Jolene Stephens at  [facility] of [address] group on Colitis     gets irritated with antibiotics   • [facility]     @ 45 cm d/t edema   • Constipation    • Coronary atherosclerosis    • Depression    • Diarrhea 11/19/2013   • Diarrhea, unspecified    • Diverticulitis    • Diverticulosis 11/19/ child   • TOTAL ABDOM HYSTERECTOMY        Family History   Problem Relation Age of Onset   • Stroke Mother    • Diabetes Mother    • Thyroid Disorder Mother    • High Blood Pressure Mother    • Heart Disorder Mother         HF   • Diabetes Father    • Hear no chest tenderness  BREAST: s/p mastectomy and currently with expanders  LUNGS: clear to auscultation  CARDIO: RRR without murmur  GI: good BS's,no masses, HSM or tenderness  : DEFERRED  RECTAL: DEFERRED  MUSCULOSKELETAL: back is not tender,FROM of the repeat lipid panel in 3 months. Potassium is mildly elevated which she will repeat in 1 week. She states she has been eating a banana every day. Her urine came back with 1+ blood. She will drink water and repeat her UA in 1 week as well.   A1c is down t

## 2021-02-12 ENCOUNTER — MED REC SCAN ONLY (OUTPATIENT)
Dept: FAMILY MEDICINE CLINIC | Facility: CLINIC | Age: 61
End: 2021-02-12

## 2021-02-16 ENCOUNTER — OFFICE VISIT (OUTPATIENT)
Dept: CARDIOLOGY | Age: 61
End: 2021-02-16

## 2021-02-16 VITALS
HEART RATE: 80 BPM | WEIGHT: 174 LBS | SYSTOLIC BLOOD PRESSURE: 138 MMHG | BODY MASS INDEX: 32.02 KG/M2 | DIASTOLIC BLOOD PRESSURE: 82 MMHG | HEIGHT: 62 IN

## 2021-02-16 DIAGNOSIS — I25.10 CORONARY ARTERY DISEASE INVOLVING NATIVE CORONARY ARTERY OF NATIVE HEART WITHOUT ANGINA PECTORIS: ICD-10-CM

## 2021-02-16 DIAGNOSIS — Z95.1 HX OF CABG: ICD-10-CM

## 2021-02-16 DIAGNOSIS — I10 HYPERTENSION, BENIGN: ICD-10-CM

## 2021-02-16 DIAGNOSIS — Z01.810 PREOPERATIVE CARDIOVASCULAR EXAMINATION: Primary | ICD-10-CM

## 2021-02-16 DIAGNOSIS — I65.23 ASYMPTOMATIC CAROTID ARTERY STENOSIS, BILATERAL: ICD-10-CM

## 2021-02-16 DIAGNOSIS — E78.01 ESSENTIAL FAMILIAL HYPERCHOLESTEROLEMIA: ICD-10-CM

## 2021-02-16 DIAGNOSIS — E88.810 METABOLIC SYNDROME: ICD-10-CM

## 2021-02-16 PROCEDURE — 3075F SYST BP GE 130 - 139MM HG: CPT | Performed by: NURSE PRACTITIONER

## 2021-02-16 PROCEDURE — 3079F DIAST BP 80-89 MM HG: CPT | Performed by: NURSE PRACTITIONER

## 2021-02-16 PROCEDURE — 93000 ELECTROCARDIOGRAM COMPLETE: CPT | Performed by: NURSE PRACTITIONER

## 2021-02-16 PROCEDURE — 99214 OFFICE O/P EST MOD 30 MIN: CPT | Performed by: NURSE PRACTITIONER

## 2021-02-16 RX ORDER — LEVOTHYROXINE SODIUM 0.03 MG/1
25 TABLET ORAL DAILY
COMMUNITY
Start: 2021-02-08

## 2021-02-16 ASSESSMENT — PATIENT HEALTH QUESTIONNAIRE - PHQ9
1. LITTLE INTEREST OR PLEASURE IN DOING THINGS: NOT AT ALL
CLINICAL INTERPRETATION OF PHQ2 SCORE: NO FURTHER SCREENING NEEDED
CLINICAL INTERPRETATION OF PHQ9 SCORE: NO FURTHER SCREENING NEEDED
SUM OF ALL RESPONSES TO PHQ9 QUESTIONS 1 AND 2: 0
2. FEELING DOWN, DEPRESSED OR HOPELESS: NOT AT ALL
SUM OF ALL RESPONSES TO PHQ9 QUESTIONS 1 AND 2: 0

## 2021-02-16 ASSESSMENT — ENCOUNTER SYMPTOMS
ABDOMINAL PAIN: 0
COUGH: 0
SYNCOPE: 0
NIGHT SWEATS: 0
BLOATING: 0
FEVER: 0
NEAR-SYNCOPE: 0
SHORTNESS OF BREATH: 0
ORTHOPNEA: 0

## 2021-04-05 RX ORDER — METOPROLOL SUCCINATE 50 MG/1
TABLET, EXTENDED RELEASE ORAL
Qty: 90 TABLET | Refills: 0 | Status: SHIPPED | OUTPATIENT
Start: 2021-04-05 | End: 2021-07-06

## 2021-04-05 RX ORDER — ROSUVASTATIN CALCIUM 40 MG/1
TABLET, COATED ORAL
Qty: 90 TABLET | Refills: 0 | Status: SHIPPED | OUTPATIENT
Start: 2021-04-05 | End: 2021-07-06

## 2021-05-04 DIAGNOSIS — E03.9 HYPOTHYROIDISM, UNSPECIFIED TYPE: ICD-10-CM

## 2021-05-04 RX ORDER — LEVOTHYROXINE SODIUM 0.03 MG/1
25 TABLET ORAL
Qty: 90 TABLET | Refills: 1 | Status: SHIPPED | OUTPATIENT
Start: 2021-05-04 | End: 2021-11-17

## 2021-05-28 RX ORDER — EVOLOCUMAB 140 MG/ML
INJECTION, SOLUTION SUBCUTANEOUS
Qty: 2 ML | Refills: 11 | Status: SHIPPED | OUTPATIENT
Start: 2021-05-28

## 2021-06-08 LAB
APPEARANCE UR: ABNORMAL
BACTERIA #/AREA URNS HPF: ABNORMAL /[HPF]
BILIRUB UR QL: NEGATIVE
CHOLEST SERPL-MCNC: 144 MG/DL
CHOLEST/HDLC SERPL: 6.5 {RATIO}
COLOR UR: YELLOW
EPI CELLS #/AREA URNS LPF: ABNORMAL /[LPF]
GLUCOSE UR-MCNC: NEGATIVE MG/DL
HDLC SERPL-MCNC: 22 MG/DL
HGB UR QL: NEGATIVE
KETONES UR-MCNC: NEGATIVE MG/DL
LDLC SERPL CALC-MCNC: 91 MG/DL (ref 0–129)
LENGTH OF FAST TIME PATIENT: ABNORMAL H
LEUKOCYTE ESTERASE UR QL STRIP: ABNORMAL
MUCOUS THREADS URNS QL MICRO: ABNORMAL
NITRITE UR QL: NEGATIVE
NONHDLC SERPL-MCNC: 122 MG/DL
PH UR: 5 [PH] (ref 5–8)
PROT UR QL: NEGATIVE MG/DL
RBC #/AREA URNS HPF: ABNORMAL /HPF (ref 0–2)
SP GR UR: 1.02 (ref 1–1.03)
TRIGL SERPL-MCNC: 156 MG/DL
UROBILINOGEN UR QL: NEGATIVE MG/DL
VLDLC SERPL CALC-MCNC: 31 MG/DL (ref 5–30)
WBC #/AREA URNS HPF: ABNORMAL /HPF (ref 0–5)

## 2021-06-14 ENCOUNTER — TELEPHONE (OUTPATIENT)
Dept: FAMILY MEDICINE CLINIC | Facility: CLINIC | Age: 61
End: 2021-06-14

## 2021-06-14 DIAGNOSIS — E11.9 CONTROLLED TYPE 2 DIABETES MELLITUS WITHOUT COMPLICATION, WITHOUT LONG-TERM CURRENT USE OF INSULIN (HCC): ICD-10-CM

## 2021-06-14 DIAGNOSIS — E11.9 TYPE 2 DIABETES MELLITUS WITHOUT COMPLICATION, WITHOUT LONG-TERM CURRENT USE OF INSULIN (HCC): Primary | ICD-10-CM

## 2021-06-14 RX ORDER — ERGOCALCIFEROL 1.25 MG/1
CAPSULE ORAL
COMMUNITY

## 2021-06-14 RX ORDER — ASPIRIN 325 MG
TABLET, DELAYED RELEASE (ENTERIC COATED) ORAL
COMMUNITY

## 2021-06-14 RX ORDER — HYDROCODONE BITARTRATE AND ACETAMINOPHEN 5; 325 MG/1; MG/1
1 TABLET ORAL PRN
COMMUNITY
Start: 2021-02-26

## 2021-06-14 NOTE — TELEPHONE ENCOUNTER
Pt had labs drawn thru [address] on 6/8. Per patient, [address] faxed over results. There were some results that were a little out of range, and patient is asking if Dr. Gil Swanson wants her to redo any of them.     She is picking up her order in our office for A1C

## 2021-06-14 NOTE — TELEPHONE ENCOUNTER
Noted.   Dr Kathrine Valdes, did you receive esequiel results? Pt requesting input/recommendations as well as order for H1X-jmtvo pended. Last A1C 6.0 2/8/21  Please advise-thanks! **need to print A1C order-pt wants to  form our ofc.

## 2021-06-15 ENCOUNTER — OFFICE VISIT (OUTPATIENT)
Dept: CARDIOLOGY | Age: 61
End: 2021-06-15

## 2021-06-15 VITALS
SYSTOLIC BLOOD PRESSURE: 110 MMHG | HEART RATE: 65 BPM | WEIGHT: 168 LBS | BODY MASS INDEX: 30.91 KG/M2 | DIASTOLIC BLOOD PRESSURE: 70 MMHG | HEIGHT: 62 IN

## 2021-06-15 DIAGNOSIS — I10 HYPERTENSION, BENIGN: ICD-10-CM

## 2021-06-15 DIAGNOSIS — Z95.1 HX OF CABG: ICD-10-CM

## 2021-06-15 DIAGNOSIS — E78.01 ESSENTIAL FAMILIAL HYPERCHOLESTEROLEMIA: ICD-10-CM

## 2021-06-15 DIAGNOSIS — Z01.810 PREOPERATIVE CARDIOVASCULAR EXAMINATION: Primary | ICD-10-CM

## 2021-06-15 DIAGNOSIS — I65.23 ASYMPTOMATIC CAROTID ARTERY STENOSIS, BILATERAL: ICD-10-CM

## 2021-06-15 DIAGNOSIS — E88.810 METABOLIC SYNDROME: ICD-10-CM

## 2021-06-15 DIAGNOSIS — E03.9 HYPOTHYROIDISM, UNSPECIFIED TYPE: ICD-10-CM

## 2021-06-15 DIAGNOSIS — I25.10 CORONARY ARTERY DISEASE INVOLVING NATIVE CORONARY ARTERY OF NATIVE HEART WITHOUT ANGINA PECTORIS: ICD-10-CM

## 2021-06-15 PROCEDURE — 3078F DIAST BP <80 MM HG: CPT | Performed by: INTERNAL MEDICINE

## 2021-06-15 PROCEDURE — 99214 OFFICE O/P EST MOD 30 MIN: CPT | Performed by: INTERNAL MEDICINE

## 2021-06-15 PROCEDURE — 3074F SYST BP LT 130 MM HG: CPT | Performed by: INTERNAL MEDICINE

## 2021-06-15 ASSESSMENT — PATIENT HEALTH QUESTIONNAIRE - PHQ9
CLINICAL INTERPRETATION OF PHQ2 SCORE: NO FURTHER SCREENING NEEDED
2. FEELING DOWN, DEPRESSED OR HOPELESS: NOT AT ALL
CLINICAL INTERPRETATION OF PHQ9 SCORE: NO FURTHER SCREENING NEEDED
SUM OF ALL RESPONSES TO PHQ9 QUESTIONS 1 AND 2: 0
1. LITTLE INTEREST OR PLEASURE IN DOING THINGS: NOT AT ALL
SUM OF ALL RESPONSES TO PHQ9 QUESTIONS 1 AND 2: 0

## 2021-06-16 ENCOUNTER — TELEPHONE (OUTPATIENT)
Dept: FAMILY MEDICINE CLINIC | Facility: CLINIC | Age: 61
End: 2021-06-16

## 2021-07-03 DIAGNOSIS — I10 ESSENTIAL HYPERTENSION: Primary | ICD-10-CM

## 2021-07-06 RX ORDER — ROSUVASTATIN CALCIUM 40 MG/1
TABLET, COATED ORAL
Qty: 90 TABLET | Refills: 0 | Status: SHIPPED | OUTPATIENT
Start: 2021-07-06 | End: 2021-10-01

## 2021-07-06 RX ORDER — METOPROLOL SUCCINATE 50 MG/1
TABLET, EXTENDED RELEASE ORAL
Qty: 90 TABLET | Refills: 0 | Status: SHIPPED | OUTPATIENT
Start: 2021-07-06 | End: 2021-12-09

## 2021-07-06 NOTE — TELEPHONE ENCOUNTER
Pt has appointment 9/14/21 with Dr Anabelle Sampson. Last OV 12/21/20 last refill of both medications was 4/5/21 for 90 day. Medication passes manual look up.

## 2021-08-09 ENCOUNTER — OFFICE VISIT (OUTPATIENT)
Dept: FAMILY MEDICINE CLINIC | Facility: CLINIC | Age: 61
End: 2021-08-09
Payer: COMMERCIAL

## 2021-08-09 VITALS
HEIGHT: 62 IN | SYSTOLIC BLOOD PRESSURE: 124 MMHG | HEART RATE: 64 BPM | BODY MASS INDEX: 31.1 KG/M2 | TEMPERATURE: 97 F | DIASTOLIC BLOOD PRESSURE: 70 MMHG | RESPIRATION RATE: 16 BRPM | WEIGHT: 169 LBS

## 2021-08-09 DIAGNOSIS — E78.01 ESSENTIAL FAMILIAL HYPERCHOLESTEROLEMIA: ICD-10-CM

## 2021-08-09 DIAGNOSIS — Z85.43 HISTORY OF OVARIAN CANCER: ICD-10-CM

## 2021-08-09 DIAGNOSIS — Z91.09 ENVIRONMENTAL ALLERGIES: ICD-10-CM

## 2021-08-09 DIAGNOSIS — E53.8 B12 DEFICIENCY: ICD-10-CM

## 2021-08-09 DIAGNOSIS — Z15.09 BRCA2 POSITIVE: ICD-10-CM

## 2021-08-09 DIAGNOSIS — I10 ESSENTIAL HYPERTENSION: ICD-10-CM

## 2021-08-09 DIAGNOSIS — E03.9 ACQUIRED HYPOTHYROIDISM: ICD-10-CM

## 2021-08-09 DIAGNOSIS — Z90.13 S/P BILATERAL MASTECTOMY: ICD-10-CM

## 2021-08-09 DIAGNOSIS — Z71.85 VACCINE COUNSELING: ICD-10-CM

## 2021-08-09 DIAGNOSIS — G89.29 CHRONIC RIGHT SHOULDER PAIN: ICD-10-CM

## 2021-08-09 DIAGNOSIS — E11.9 CONTROLLED TYPE 2 DIABETES MELLITUS WITHOUT COMPLICATION, WITHOUT LONG-TERM CURRENT USE OF INSULIN (HCC): Primary | ICD-10-CM

## 2021-08-09 DIAGNOSIS — M25.511 CHRONIC RIGHT SHOULDER PAIN: ICD-10-CM

## 2021-08-09 DIAGNOSIS — D50.9 IRON DEFICIENCY ANEMIA, UNSPECIFIED IRON DEFICIENCY ANEMIA TYPE: ICD-10-CM

## 2021-08-09 DIAGNOSIS — Z95.1 HX OF CABG: ICD-10-CM

## 2021-08-09 DIAGNOSIS — Z15.01 BRCA2 POSITIVE: ICD-10-CM

## 2021-08-09 DIAGNOSIS — F40.10 SOCIAL ANXIETY DISORDER: ICD-10-CM

## 2021-08-09 DIAGNOSIS — Z79.899 MEDICATION MANAGEMENT: ICD-10-CM

## 2021-08-09 DIAGNOSIS — E03.9 HYPOTHYROIDISM, UNSPECIFIED TYPE: ICD-10-CM

## 2021-08-09 DIAGNOSIS — I65.23 ASYMPTOMATIC CAROTID ARTERY STENOSIS, BILATERAL: ICD-10-CM

## 2021-08-09 PROCEDURE — 90471 IMMUNIZATION ADMIN: CPT | Performed by: FAMILY MEDICINE

## 2021-08-09 PROCEDURE — 3078F DIAST BP <80 MM HG: CPT | Performed by: FAMILY MEDICINE

## 2021-08-09 PROCEDURE — 99214 OFFICE O/P EST MOD 30 MIN: CPT | Performed by: FAMILY MEDICINE

## 2021-08-09 PROCEDURE — 3074F SYST BP LT 130 MM HG: CPT | Performed by: FAMILY MEDICINE

## 2021-08-09 PROCEDURE — 90732 PPSV23 VACC 2 YRS+ SUBQ/IM: CPT | Performed by: FAMILY MEDICINE

## 2021-08-09 PROCEDURE — 3008F BODY MASS INDEX DOCD: CPT | Performed by: FAMILY MEDICINE

## 2021-08-09 RX ORDER — CLOBETASOL PROPIONATE 0.5 MG/G
AEROSOL, FOAM TOPICAL
COMMUNITY
Start: 2021-02-16 | End: 2021-09-14 | Stop reason: ALTCHOICE

## 2021-08-09 NOTE — PROGRESS NOTES
Tavo Presley is a 57year old female. HPI:   Pt. Is here for a med check.   Asking Dr. Carrie Mckee about MMR and said it was ok  Obese -- aware she needs and can lose weight  DM-- A1c is down to 6.0  Dyslipidemia/carotid disease -- stable on rosuvastatin; see surgery and chemo done   • Change in stool    • Colitis     gets irritated with antibiotics   • Colonic obstruction (HCC)     @ 45 cm d/t edema   • Constipation    • Coronary atherosclerosis    • Depression    • Diarrhea 11/19/2013   • Diarrhea, unspecifie heartburn  : denies dysuria  MUSCULOSKELETAL: denies back pain; right shoulder pain  NEURO: denies headaches  PSYCHE: denies depression or anxiety  HEMATOLOGIC: denies hx of anemia  ENDOCRINE: denies thyroid history  ALL/ASTHMA: denies hx of allergy or a cancer  --  Dr. Santi Olmstead said ok with MMR vaccine  Vaccine counseling -- given of pneumovax 23 today  S/P bilateral mastectomy and reconstruction -- stable  Obese -- advised to lose 10-15 lbs  Iron def anemia -- stable  Vitamin B12 def -- stable  Colonoscopy

## 2021-08-12 ENCOUNTER — TELEPHONE (OUTPATIENT)
Dept: FAMILY MEDICINE CLINIC | Facility: CLINIC | Age: 61
End: 2021-08-12

## 2021-08-12 NOTE — TELEPHONE ENCOUNTER
I called and spoke to pt. She asked for the order for PT and for the Xray of the shoulder to be placed in reception for her to . Orders printed and put in reception for .

## 2021-08-12 NOTE — TELEPHONE ENCOUNTER
Pt needing order for Physical Therapy changed to [facility] system. Please advise.

## 2021-08-23 ENCOUNTER — TELEPHONE (OUTPATIENT)
Dept: FAMILY MEDICINE CLINIC | Facility: CLINIC | Age: 61
End: 2021-08-23

## 2021-08-23 NOTE — TELEPHONE ENCOUNTER
Looking for her shoulder xray result done last week. Please advise.   Want to make sure ok to do PT.

## 2021-08-23 NOTE — TELEPHONE ENCOUNTER
I called pt and notified her Dr. Krishan Butler has not received the results of her shoulder xray. I instructed pt to please call the facility and see if they can fax the results over.  Pt agreed to plan and verbalized the correct fax number to have the results se

## 2021-08-23 NOTE — TELEPHONE ENCOUNTER
Pt states she received her Xray results in my chart and is concerned for what they show. Pt states she completed her Xray for her shoulder the other week and still has not received a call from our office to discuss the results.     Pt states she has seen

## 2021-08-24 ENCOUNTER — TELEPHONE (OUTPATIENT)
Dept: FAMILY MEDICINE CLINIC | Facility: CLINIC | Age: 61
End: 2021-08-24

## 2021-08-24 NOTE — TELEPHONE ENCOUNTER
Per Dr. Deyanira Lynch she looked over report states mild to moderate OA, May start PT. Patient informed and verbalizes understanding.

## 2021-08-24 NOTE — TELEPHONE ENCOUNTER
Continuity of Care document from [facility] dropped off for Dr. Rocky Carson. Placed in Dr. Marlen Walsh in box.

## 2021-09-07 NOTE — TELEPHONE ENCOUNTER
Pt is due for a refill on her fluoxetine 10 mg. She has been weaning off the medication. She has been taking 10 mg every other day. Do you think she could stop at this pont or does she need to decrease dose some more before stopping? Banner Transposition Flap Text: The defect edges were debeveled with a #15 scalpel blade.  Given the location of the defect and the proximity to free margins a Banner transposition flap was deemed most appropriate.  Using a sterile surgical marker, an appropriate flap drawn around the defect. The area thus outlined was incised deep to adipose tissue with a #15 scalpel blade.  The skin margins were undermined to an appropriate distance in all directions utilizing iris scissors.

## 2021-09-08 ENCOUNTER — MED REC SCAN ONLY (OUTPATIENT)
Dept: FAMILY MEDICINE CLINIC | Facility: CLINIC | Age: 61
End: 2021-09-08

## 2021-09-14 ENCOUNTER — OFFICE VISIT (OUTPATIENT)
Dept: FAMILY MEDICINE CLINIC | Facility: CLINIC | Age: 61
End: 2021-09-14
Payer: COMMERCIAL

## 2021-09-14 VITALS
RESPIRATION RATE: 16 BRPM | DIASTOLIC BLOOD PRESSURE: 80 MMHG | SYSTOLIC BLOOD PRESSURE: 130 MMHG | HEART RATE: 76 BPM | WEIGHT: 168.38 LBS | HEIGHT: 62 IN | BODY MASS INDEX: 30.99 KG/M2

## 2021-09-14 DIAGNOSIS — Z71.85 VACCINE COUNSELING: ICD-10-CM

## 2021-09-14 DIAGNOSIS — Z95.1 HX OF CABG: ICD-10-CM

## 2021-09-14 DIAGNOSIS — Z15.09 BRCA2 POSITIVE: ICD-10-CM

## 2021-09-14 DIAGNOSIS — I10 ESSENTIAL HYPERTENSION: Primary | ICD-10-CM

## 2021-09-14 DIAGNOSIS — E06.3 HYPOTHYROIDISM DUE TO HASHIMOTO'S THYROIDITIS: ICD-10-CM

## 2021-09-14 DIAGNOSIS — Z85.43 HISTORY OF OVARIAN CANCER: ICD-10-CM

## 2021-09-14 DIAGNOSIS — Z15.01 BRCA2 POSITIVE: ICD-10-CM

## 2021-09-14 DIAGNOSIS — Z79.899 MEDICATION MANAGEMENT: ICD-10-CM

## 2021-09-14 DIAGNOSIS — E78.00 HYPERCHOLESTEREMIA: ICD-10-CM

## 2021-09-14 DIAGNOSIS — E03.8 HYPOTHYROIDISM DUE TO HASHIMOTO'S THYROIDITIS: ICD-10-CM

## 2021-09-14 DIAGNOSIS — Z90.13 S/P BILATERAL MASTECTOMY: ICD-10-CM

## 2021-09-14 DIAGNOSIS — E11.9 CONTROLLED TYPE 2 DIABETES MELLITUS WITHOUT COMPLICATION, WITHOUT LONG-TERM CURRENT USE OF INSULIN (HCC): ICD-10-CM

## 2021-09-14 DIAGNOSIS — E78.01 ESSENTIAL FAMILIAL HYPERCHOLESTEROLEMIA: ICD-10-CM

## 2021-09-14 PROCEDURE — 99214 OFFICE O/P EST MOD 30 MIN: CPT | Performed by: FAMILY MEDICINE

## 2021-09-14 PROCEDURE — 3079F DIAST BP 80-89 MM HG: CPT | Performed by: FAMILY MEDICINE

## 2021-09-14 PROCEDURE — 3075F SYST BP GE 130 - 139MM HG: CPT | Performed by: FAMILY MEDICINE

## 2021-09-14 PROCEDURE — 90472 IMMUNIZATION ADMIN EACH ADD: CPT | Performed by: FAMILY MEDICINE

## 2021-09-14 PROCEDURE — 3008F BODY MASS INDEX DOCD: CPT | Performed by: FAMILY MEDICINE

## 2021-09-14 PROCEDURE — 90471 IMMUNIZATION ADMIN: CPT | Performed by: FAMILY MEDICINE

## 2021-09-14 PROCEDURE — 90746 HEPB VACCINE 3 DOSE ADULT IM: CPT | Performed by: FAMILY MEDICINE

## 2021-09-14 PROCEDURE — 90632 HEPA VACCINE ADULT IM: CPT | Performed by: FAMILY MEDICINE

## 2021-09-14 NOTE — PROGRESS NOTES
Pietro Ac is a 57year old female. HPI:   Pt. Is here for med check.   DM -- stable  HTN -- stable at home -- 110's/60/70's  Dyslipidemia -- seeing Dr. Alexandra Bush in 12/ 2021  Hypothyroid -- stable  Right shoulder pain -- doing PT and had recent xray  Obes uterus    • Food intolerance    • Gastrointestinal disorder     colitis   • Hemorrhoids    • High blood pressure    • High cholesterol    • High coronary artery calcium score    • History of blood transfusion     7/2020   • Hyperlipidemia LDL goal <70    • adenopathy,no bruits  LUNGS: clear to auscultation  CARDIO: RRR without murmur  GI: soft, good BS's  EXTREMITIES: no cyanosis, clubbing or edema    ASSESSMENT AND PLAN:   Essential hypertension  (primary encounter diagnosis)  Controlled type 2 diabetes ximena

## 2021-10-01 DIAGNOSIS — I10 ESSENTIAL HYPERTENSION: ICD-10-CM

## 2021-10-01 RX ORDER — ROSUVASTATIN CALCIUM 40 MG/1
TABLET, COATED ORAL
Qty: 90 TABLET | Refills: 0 | Status: SHIPPED | OUTPATIENT
Start: 2021-10-01 | End: 2021-12-17

## 2021-10-15 NOTE — LETTER
Addended by: SOUTH PINTO on: 10/15/2021 11:16 AM     Modules accepted: Orders     Angella Morocho M.D., F.A.C.S. Mika Toribio M.D., F.A.C.S. Ericka Junior M.D., Emiliano Ansari. OLIVER Alamo M.D., F.A.C.SRodrigo Christopher. Pawan Ching M.D., F.A.C.S. RUDOLPH Bobo M. Emmie Dress A.D. Philis Lovings, M.D., TRACE chance to have all of your questions and concerns answered. If there are any issues which have not been adequately addressed, we ask you to bring them forward so that we can thoroughly address them.     A patient who is fully informed and understands their treatment, among other options and the risks and benefits of the different treatment options:    Yes _____ No _____    A [facility] surgeon as explained to me that if I should so desire, he/she is willing to explain my case and the surgical and non-surgical optio

## 2021-11-09 DIAGNOSIS — E03.9 HYPOTHYROIDISM, UNSPECIFIED TYPE: ICD-10-CM

## 2021-11-15 NOTE — TELEPHONE ENCOUNTER
Called patient and left voicemail since she has not seen [employer] message sent 6 days ago yet. Updating on refill request, need to make an appt or go to walk in lab before refilling medication.

## 2021-11-17 RX ORDER — LEVOTHYROXINE SODIUM 0.03 MG/1
TABLET ORAL
Qty: 90 TABLET | Refills: 0 | Status: SHIPPED | OUTPATIENT
Start: 2021-11-17 | End: 2022-01-13

## 2021-11-17 NOTE — TELEPHONE ENCOUNTER
LOV: 9/14/21  for: hypothyroidism due to hashimoto's thyroiditis  Patient advised to RTC on:  3 months  Previous Rx:  Levothyroxine 25mcgà Sig: Take 1 tablet by mouth before breakfast. Disp #90/1 refills.   LF: 5/4/21    NOV: 12/17/21

## 2021-11-23 ENCOUNTER — MED REC SCAN ONLY (OUTPATIENT)
Dept: FAMILY MEDICINE CLINIC | Facility: CLINIC | Age: 61
End: 2021-11-23

## 2021-12-09 DIAGNOSIS — I10 ESSENTIAL HYPERTENSION: ICD-10-CM

## 2021-12-09 RX ORDER — METOPROLOL SUCCINATE 50 MG/1
50 TABLET, EXTENDED RELEASE ORAL DAILY
Qty: 90 TABLET | Refills: 0 | Status: SHIPPED | OUTPATIENT
Start: 2021-12-09 | End: 2022-01-07

## 2021-12-17 ENCOUNTER — OFFICE VISIT (OUTPATIENT)
Dept: FAMILY MEDICINE CLINIC | Facility: CLINIC | Age: 61
End: 2021-12-17
Payer: COMMERCIAL

## 2021-12-17 ENCOUNTER — MED REC SCAN ONLY (OUTPATIENT)
Dept: FAMILY MEDICINE CLINIC | Facility: CLINIC | Age: 61
End: 2021-12-17

## 2021-12-17 VITALS
DIASTOLIC BLOOD PRESSURE: 70 MMHG | RESPIRATION RATE: 16 BRPM | HEART RATE: 68 BPM | SYSTOLIC BLOOD PRESSURE: 128 MMHG | HEIGHT: 62 IN | WEIGHT: 169 LBS | BODY MASS INDEX: 31.1 KG/M2

## 2021-12-17 DIAGNOSIS — Z95.1 HX OF CABG: ICD-10-CM

## 2021-12-17 DIAGNOSIS — E66.09 CLASS 1 OBESITY DUE TO EXCESS CALORIES WITH BODY MASS INDEX (BMI) OF 30.0 TO 30.9 IN ADULT, UNSPECIFIED WHETHER SERIOUS COMORBIDITY PRESENT: ICD-10-CM

## 2021-12-17 DIAGNOSIS — I10 ESSENTIAL HYPERTENSION: ICD-10-CM

## 2021-12-17 DIAGNOSIS — Z79.899 MEDICATION MANAGEMENT: ICD-10-CM

## 2021-12-17 DIAGNOSIS — Z71.85 VACCINE COUNSELING: ICD-10-CM

## 2021-12-17 DIAGNOSIS — E11.9 CONTROLLED TYPE 2 DIABETES MELLITUS WITHOUT COMPLICATION, WITHOUT LONG-TERM CURRENT USE OF INSULIN (HCC): Primary | ICD-10-CM

## 2021-12-17 DIAGNOSIS — E78.00 HYPERCHOLESTEREMIA: ICD-10-CM

## 2021-12-17 PROCEDURE — 90746 HEPB VACCINE 3 DOSE ADULT IM: CPT | Performed by: FAMILY MEDICINE

## 2021-12-17 PROCEDURE — 3008F BODY MASS INDEX DOCD: CPT | Performed by: FAMILY MEDICINE

## 2021-12-17 PROCEDURE — 3078F DIAST BP <80 MM HG: CPT | Performed by: FAMILY MEDICINE

## 2021-12-17 PROCEDURE — 90471 IMMUNIZATION ADMIN: CPT | Performed by: FAMILY MEDICINE

## 2021-12-17 PROCEDURE — 3074F SYST BP LT 130 MM HG: CPT | Performed by: FAMILY MEDICINE

## 2021-12-17 PROCEDURE — 99214 OFFICE O/P EST MOD 30 MIN: CPT | Performed by: FAMILY MEDICINE

## 2021-12-17 RX ORDER — ROSUVASTATIN CALCIUM 40 MG/1
40 TABLET, COATED ORAL NIGHTLY
Qty: 90 TABLET | Refills: 1 | Status: SHIPPED | OUTPATIENT
Start: 2021-12-17 | End: 2021-12-17

## 2021-12-17 RX ORDER — ROSUVASTATIN CALCIUM 40 MG/1
40 TABLET, COATED ORAL NIGHTLY
Qty: 30 TABLET | Refills: 1 | Status: SHIPPED | OUTPATIENT
Start: 2021-12-17 | End: 2022-01-13

## 2021-12-17 NOTE — PROGRESS NOTES
Corby Ortiz is a 57year old female. HPI:   Pt. Is here for med check.   DM -- stable  HTN -- stable at home -- 110's/60/70's  Dyslipidemia -- seeing Dr. Jaya Oswald in 12/ 2021  Hypothyroid -- stable  Obese -- up1 lb; aware she did not lose weight  Had recen Fibroid uterus    • Food intolerance    • Gastrointestinal disorder     colitis   • Hemorrhoids    • High blood pressure    • High cholesterol    • High coronary artery calcium score    • History of blood transfusion     7/2020   • Hyperlipidemia LDL goal without murmur  GI: soft, good BS's  EXTREMITIES: no cyanosis, clubbing or edema    ASSESSMENT AND PLAN:   Essential hypertension  Controlled type 2 diabetes mellitus without complication, without long-term current use of insulin (hcc)  (primary encounter

## 2022-01-07 ENCOUNTER — TELEPHONE (OUTPATIENT)
Dept: FAMILY MEDICINE CLINIC | Facility: CLINIC | Age: 62
End: 2022-01-07

## 2022-01-07 DIAGNOSIS — I10 ESSENTIAL HYPERTENSION: ICD-10-CM

## 2022-01-07 RX ORDER — METOPROLOL SUCCINATE 50 MG/1
50 TABLET, EXTENDED RELEASE ORAL DAILY
Qty: 90 TABLET | Refills: 0 | Status: SHIPPED | OUTPATIENT
Start: 2022-01-07

## 2022-01-07 NOTE — TELEPHONE ENCOUNTER
Mei Jasmine is calling to get a refill on her metoprolol succinate 50 MG Oral Tablet 24 Hr sent to her [facility] [id number] home delivery, it needs to be a 90 day supply, questions or concerns please call Mei Jasmine at [phone]

## 2022-01-12 ENCOUNTER — PATIENT MESSAGE (OUTPATIENT)
Dept: FAMILY MEDICINE CLINIC | Facility: CLINIC | Age: 62
End: 2022-01-12

## 2022-01-12 DIAGNOSIS — I10 ESSENTIAL HYPERTENSION: ICD-10-CM

## 2022-01-12 DIAGNOSIS — E03.9 HYPOTHYROIDISM, UNSPECIFIED TYPE: ICD-10-CM

## 2022-01-12 NOTE — TELEPHONE ENCOUNTER
From: Maritza Brady  To: Ros Dimas DO  Sent: 1/12/2022 4:30 PM CST  Subject: Medication refill for Metoprolol     Hi Doctor NICHOLAS [facility], can you please refill my mail order 90 day prescription through [facility] [address]? I appreciate it!  Luc

## 2022-01-13 RX ORDER — LEVOTHYROXINE SODIUM 0.03 MG/1
25 TABLET ORAL
Qty: 90 TABLET | Refills: 1 | Status: SHIPPED | OUTPATIENT
Start: 2022-01-13

## 2022-01-13 RX ORDER — ROSUVASTATIN CALCIUM 40 MG/1
40 TABLET, COATED ORAL NIGHTLY
Qty: 90 TABLET | Refills: 1 | Status: SHIPPED | OUTPATIENT
Start: 2022-01-13

## 2022-01-25 LAB
AMB EXT ANION GAP: 4 (ref 8–?)
AMB EXT BILIRUBIN, TOTAL: 0.4 MG/DL
AMB EXT BUN: 12 MG/DL (ref ?–25)
AMB EXT CALCIUM: 9.5 (ref ?–10.4)
AMB EXT CARBON DIOXIDE: 30 (ref ?–31)
AMB EXT CHLORIDE: 106 (ref ?–107)
AMB EXT CMP ALT: 16 U/L
AMB EXT CMP AST: 22 U/L (ref ?–39)
AMB EXT CREATININE: 0.67 MG/DL (ref ?–1.2)
AMB EXT EGFR NON-AA: 93.8
AMB EXT POSTASSIUM: 4 MMOL/L (ref ?–5.1)
AMB EXT SODIUM: 140 MMOL/L (ref ?–144)
AMB EXT TOTAL PROTEIN: 6.8

## 2022-01-25 NOTE — PROGRESS NOTES
Received labwork results from [facility] per Dr Magalie Lewis. Abstracted into chart.

## 2022-06-10 ENCOUNTER — TELEPHONE (OUTPATIENT)
Dept: FAMILY MEDICINE CLINIC | Facility: CLINIC | Age: 62
End: 2022-06-10

## 2022-06-28 ENCOUNTER — TELEPHONE (OUTPATIENT)
Dept: FAMILY MEDICINE CLINIC | Facility: CLINIC | Age: 62
End: 2022-06-28

## 2022-06-28 DIAGNOSIS — I10 ESSENTIAL HYPERTENSION: ICD-10-CM

## 2022-06-28 RX ORDER — METOPROLOL SUCCINATE 50 MG/1
50 TABLET, EXTENDED RELEASE ORAL DAILY
Qty: 90 TABLET | Refills: 0 | Status: SHIPPED | OUTPATIENT
Start: 2022-06-28

## 2022-06-28 NOTE — TELEPHONE ENCOUNTER
Asking for refill of metoprolol succinate 50 MG Oral Tablet 24 Hr to [facility] home delivery. Asking for 90 days.

## 2022-07-11 ENCOUNTER — TELEPHONE (OUTPATIENT)
Dept: FAMILY MEDICINE CLINIC | Facility: CLINIC | Age: 62
End: 2022-07-11

## 2022-07-11 NOTE — TELEPHONE ENCOUNTER
Patient called and wants to only see Dr. Radha Ge. She is complaining of a right pain on the side and under her breast.     She would like a RN to call her asap. She is wondering if she should go to the ER or not. She has had it for a few days now.

## 2022-07-11 NOTE — TELEPHONE ENCOUNTER
I called patient-->I left a message that she should go to ED for evaluation. I also sent her a [employer] Message indicating she should go to ED for evaluation.

## 2022-07-13 NOTE — TELEPHONE ENCOUNTER
Spoke to patient. Went to immediate care- [facility]. 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 She apologized for not keeping us in loop. Plural effusion found by them. Thoracentesis is scheduled by pulmonologist on Monday. Feeling good overall,  trouble taking deep breaths on occasion. Advil once a day. Wanted to tell you Hi.

## 2022-07-15 DIAGNOSIS — I10 ESSENTIAL HYPERTENSION: ICD-10-CM

## 2022-07-15 RX ORDER — METOPROLOL SUCCINATE 50 MG/1
TABLET, EXTENDED RELEASE ORAL
Qty: 90 TABLET | Refills: 0 | Status: SHIPPED | OUTPATIENT
Start: 2022-07-15

## 2022-07-15 NOTE — TELEPHONE ENCOUNTER
Patient is wondering if her prescription    metoprolol succinate ER 50 MG Oral Tablet 24 Hr    Has been approved so she can pick it up. She is all out.

## 2022-07-15 NOTE — TELEPHONE ENCOUNTER
Metoprolol succinate ER 50 mg was filled on 06/28/2022 to [facility]. Did she want it sent to [place]? I can go in and resend.

## 2022-07-19 ENCOUNTER — MED REC SCAN ONLY (OUTPATIENT)
Dept: FAMILY MEDICINE CLINIC | Facility: CLINIC | Age: 62
End: 2022-07-19

## 2022-08-22 ENCOUNTER — TELEPHONE (OUTPATIENT)
Dept: FAMILY MEDICINE CLINIC | Facility: CLINIC | Age: 62
End: 2022-08-22

## 2022-08-22 LAB
AMB EXT ANION GAP: 8 (ref 6–15)
AMB EXT BILIRUBIN, TOTAL: 0.3 MG/DL (ref 0.3–1)
AMB EXT BUN: 8 MG/DL (ref 7–25)
AMB EXT CALCIUM: 9.1 (ref 8.6–10.4)
AMB EXT CARBON DIOXIDE: 26 (ref 21–31)
AMB EXT CHLORIDE: 105 (ref 98–107)
AMB EXT CHOL/HDL RATIO: 6 (ref ?–5)
AMB EXT CHOLESTEROL, TOTAL: 150 MG/DL (ref ?–200)
AMB EXT CMP ALT: 17 U/L (ref 7–52)
AMB EXT CMP AST: 18 U/L (ref 13–39)
AMB EXT CREATININE: 0.54 MG/DL (ref 0.6–1.2)
AMB EXT GLUCOSE: 101 MG/DL (ref 70–99)
AMB EXT HDL CHOLESTEROL: 25 MG/DL (ref 40–?)
AMB EXT HEMATOCRIT: 40.4 (ref 38–50)
AMB EXT HEMOGLOBIN: 12.9 (ref 12.1–16.4)
AMB EXT LDL CHOLESTEROL, DIRECT: 85 MG/DL (ref 0–129)
AMB EXT MCV: 95.3 (ref 79–98)
AMB EXT NON HDL CHOL: 125 MG/DL (ref ?–130)
AMB EXT PLATELETS: 256 (ref 150–400)
AMB EXT POSTASSIUM: 4 MMOL/L (ref 3.5–5.1)
AMB EXT SODIUM: 139 MMOL/L (ref 133–144)
AMB EXT TOTAL PROTEIN: 6.6 (ref 6.4–8.9)
AMB EXT TRIGLYCERIDES: 198 MG/DL (ref ?–150)
AMB EXT TSH: 2.85 MIU/ML (ref 0.27–4.2)
AMB EXT VLDL: 40 MG/DL (ref 5–30)
AMB EXT WBC: 4 X10(3)UL (ref 4.5–11)

## 2022-08-23 ENCOUNTER — TELEPHONE (OUTPATIENT)
Dept: FAMILY MEDICINE CLINIC | Facility: CLINIC | Age: 62
End: 2022-08-23

## 2022-08-23 NOTE — TELEPHONE ENCOUNTER
Home COVID test positive today. Symptoms:  Headache and sore throat.     Scheduled for chemo treatment on Thursday; has a call into Oncologist.

## 2022-08-23 NOTE — TELEPHONE ENCOUNTER
Exposed to covid Sat. PM  COVID + on: today. Date of ONSET: last night  CURRENTLY:  Cough: related to pleural effusion. Onc tapped off 7/18/22 fluid and filled up again. Port placed. One spot of effusion and is drying it up. Next chemo Thursday-awaiting call    SOB/Chest tightness/Chest pain: on Chemo-+pleural effusion-so yes, mild SOB but no pain    Headache: frontal H/A controlled with Advil  Fever:99.5  Throat: +sore throat  Appetite/Hydrating: Appetite OK    Supportive MEDICATIONS: Advil. Chemo  The next condition follow up call will be on: Thurs. On Chemo    Patient is aware of the current CDC guidelines related to quarantine. Patient was advised to seek immediate medical attention if symptoms worsen; Especially if SOB, tightness of chest, and/or chest pain go to the emergency department.  Patient verbalized understanding, no further questions or concerns at this time

## 2022-08-23 NOTE — TELEPHONE ENCOUNTER
[facility] called [facility]-No one has the MAB in stock. The urgent care  gave me a number to call [facility], she said they are the only ones who have it. I called and was sent to voice mail, I did leave message to call us. I did relay this to Bobby and gave her the number to call as well. She will try to call and find out where she can go. She will as Onc if ok for us to Rx cough suppressant at night, most likely they don't want to suppress cough, she is just using [employer].

## 2022-09-08 ENCOUNTER — TELEPHONE (OUTPATIENT)
Dept: FAMILY MEDICINE CLINIC | Facility: CLINIC | Age: 62
End: 2022-09-08

## 2022-09-08 DIAGNOSIS — E53.8 B12 DEFICIENCY: Primary | ICD-10-CM

## 2022-09-08 DIAGNOSIS — E55.9 VITAMIN D DEFICIENCY: ICD-10-CM

## 2022-09-08 NOTE — TELEPHONE ENCOUNTER
Pt has an upcoming CPE. Pt is requesting her annual labs be ordered so she can have them drawn prior to her appt to discuss results. Pt uses an outside lab. Pt is requesting a call to let her know when labs are ordered. Also, requesting them be printed and left at the  for .     Thank you    Future Appointments   Date Time Provider Jesus Aranda   9/12/2022  7:30 AM Cecille Hinojosa DO [facility] [employer]

## 2022-09-08 NOTE — TELEPHONE ENCOUNTER
Looks like she had a bunch of stuff done recently, did you want anything drawn for her 9/12/22 visit?

## 2022-09-12 ENCOUNTER — OFFICE VISIT (OUTPATIENT)
Dept: FAMILY MEDICINE CLINIC | Facility: CLINIC | Age: 62
End: 2022-09-12
Payer: COMMERCIAL

## 2022-09-12 VITALS
HEIGHT: 62 IN | HEART RATE: 69 BPM | RESPIRATION RATE: 16 BRPM | OXYGEN SATURATION: 99 % | WEIGHT: 160 LBS | SYSTOLIC BLOOD PRESSURE: 120 MMHG | BODY MASS INDEX: 29.44 KG/M2 | DIASTOLIC BLOOD PRESSURE: 70 MMHG

## 2022-09-12 DIAGNOSIS — Z85.43 HISTORY OF OVARIAN CANCER: ICD-10-CM

## 2022-09-12 DIAGNOSIS — R73.03 BORDERLINE DIABETES: ICD-10-CM

## 2022-09-12 DIAGNOSIS — Z12.11 SPECIAL SCREENING FOR MALIGNANT NEOPLASMS, COLON: ICD-10-CM

## 2022-09-12 DIAGNOSIS — C56.1 MALIGNANT NEOPLASM OF RIGHT OVARY (HCC): ICD-10-CM

## 2022-09-12 DIAGNOSIS — Z12.11 SCREENING FOR COLON CANCER: ICD-10-CM

## 2022-09-12 DIAGNOSIS — Z00.00 ROUTINE GENERAL MEDICAL EXAMINATION AT A HEALTH CARE FACILITY: Primary | ICD-10-CM

## 2022-09-12 PROCEDURE — 3008F BODY MASS INDEX DOCD: CPT | Performed by: FAMILY MEDICINE

## 2022-09-12 PROCEDURE — 90677 PCV20 VACCINE IM: CPT | Performed by: FAMILY MEDICINE

## 2022-09-12 PROCEDURE — 90750 HZV VACC RECOMBINANT IM: CPT | Performed by: FAMILY MEDICINE

## 2022-09-12 PROCEDURE — 90471 IMMUNIZATION ADMIN: CPT | Performed by: FAMILY MEDICINE

## 2022-09-12 PROCEDURE — 90472 IMMUNIZATION ADMIN EACH ADD: CPT | Performed by: FAMILY MEDICINE

## 2022-09-12 PROCEDURE — 99396 PREV VISIT EST AGE 40-64: CPT | Performed by: FAMILY MEDICINE

## 2022-09-12 PROCEDURE — 3074F SYST BP LT 130 MM HG: CPT | Performed by: FAMILY MEDICINE

## 2022-09-12 PROCEDURE — 3078F DIAST BP <80 MM HG: CPT | Performed by: FAMILY MEDICINE

## 2022-09-12 RX ORDER — ONDANSETRON HYDROCHLORIDE 8 MG/1
TABLET, FILM COATED ORAL
COMMUNITY
Start: 2022-09-10

## 2022-09-12 RX ORDER — PROCHLORPERAZINE MALEATE 10 MG
TABLET ORAL
COMMUNITY
Start: 2022-08-05

## 2022-09-12 RX ORDER — HYDROCODONE BITARTRATE AND ACETAMINOPHEN 5; 325 MG/1; MG/1
TABLET ORAL
COMMUNITY
Start: 2022-09-01

## 2022-09-19 ENCOUNTER — MED REC SCAN ONLY (OUTPATIENT)
Dept: FAMILY MEDICINE CLINIC | Facility: CLINIC | Age: 62
End: 2022-09-19

## 2022-09-30 ENCOUNTER — TELEPHONE (OUTPATIENT)
Dept: FAMILY MEDICINE CLINIC | Facility: CLINIC | Age: 62
End: 2022-09-30

## 2022-10-04 DIAGNOSIS — E03.9 HYPOTHYROIDISM, UNSPECIFIED TYPE: ICD-10-CM

## 2022-10-04 DIAGNOSIS — I10 ESSENTIAL HYPERTENSION: ICD-10-CM

## 2022-10-04 RX ORDER — ROSUVASTATIN CALCIUM 40 MG/1
TABLET, COATED ORAL
Qty: 90 TABLET | Refills: 1 | Status: SHIPPED | OUTPATIENT
Start: 2022-10-04

## 2022-10-04 RX ORDER — METOPROLOL SUCCINATE 50 MG/1
TABLET, EXTENDED RELEASE ORAL
Qty: 90 TABLET | Refills: 1 | Status: SHIPPED | OUTPATIENT
Start: 2022-10-04

## 2022-10-04 RX ORDER — LEVOTHYROXINE SODIUM 0.03 MG/1
TABLET ORAL
Qty: 90 TABLET | Refills: 1 | Status: SHIPPED | OUTPATIENT
Start: 2022-10-04

## 2022-10-05 ENCOUNTER — TELEPHONE (OUTPATIENT)
Dept: FAMILY MEDICINE CLINIC | Facility: CLINIC | Age: 62
End: 2022-10-05

## 2022-10-05 LAB
AMB EXT ANION GAP: 5 (ref 6–15)
AMB EXT BILIRUBIN, TOTAL: 0.4 MG/DL (ref 0.3–1)
AMB EXT BUN: 10 MG/DL (ref 7–25)
AMB EXT CALCIUM: 8.9 (ref 8.6–10.4)
AMB EXT CARBON DIOXIDE: 30 (ref 21–31)
AMB EXT CHLORIDE: 105 (ref 98–107)
AMB EXT CMP ALT: 12 U/L (ref 7–52)
AMB EXT CMP AST: 16 U/L (ref 13–39)
AMB EXT CREATININE: 0.74 MG/DL (ref 0.6–1.2)
AMB EXT GLUCOSE: 99 MG/DL (ref 70–99)
AMB EXT HEMATOCRIT: 32.9 (ref 38–50)
AMB EXT HEMOGLOBIN: 10.7 (ref 12.1–16.4)
AMB EXT MCV: 96.5 (ref 79–98)
AMB EXT PLATELETS: 57 (ref 150–400)
AMB EXT POSTASSIUM: 3.7 MMOL/L (ref 3.5–5.1)
AMB EXT SODIUM: 140 MMOL/L (ref 133–144)
AMB EXT TOTAL PROTEIN: 7.2 (ref 6.4–8.9)
AMB EXT WBC: 1.3 X10(3)UL (ref 4.5–11)

## 2022-10-28 LAB
AMB EXT ANION GAP: 7 (ref 6–15)
AMB EXT BILIRUBIN, TOTAL: 0.4 MG/DL (ref 0.3–1)
AMB EXT BUN: 7 MG/DL (ref 7–25)
AMB EXT CALCIUM: 9.1 (ref 8.6–10.4)
AMB EXT CARBON DIOXIDE: 27 (ref 21–31)
AMB EXT CHLORIDE: 104 (ref 98–107)
AMB EXT CMP ALT: 17 U/L (ref 7–52)
AMB EXT CMP AST: 17 U/L (ref 13–39)
AMB EXT CREATININE: 0.68 MG/DL (ref 0.6–1.2)
AMB EXT GLUCOSE: 108 MG/DL (ref 70–99)
AMB EXT POSTASSIUM: 3.2 MMOL/L (ref 3.5–5.1)
AMB EXT SODIUM: 138 MMOL/L (ref 133–144)
AMB EXT TOTAL PROTEIN: 7.2 (ref 6.4–8.9)

## 2022-10-31 ENCOUNTER — TELEPHONE (OUTPATIENT)
Dept: FAMILY MEDICINE CLINIC | Facility: CLINIC | Age: 62
End: 2022-10-31

## 2022-11-30 ENCOUNTER — TELEPHONE (OUTPATIENT)
Dept: FAMILY MEDICINE CLINIC | Facility: CLINIC | Age: 62
End: 2022-11-30

## 2022-12-09 DIAGNOSIS — I10 ESSENTIAL HYPERTENSION: ICD-10-CM

## 2022-12-09 DIAGNOSIS — E03.9 HYPOTHYROIDISM, UNSPECIFIED TYPE: ICD-10-CM

## 2022-12-09 RX ORDER — LEVOTHYROXINE SODIUM 0.03 MG/1
TABLET ORAL
Qty: 90 TABLET | Refills: 1 | Status: SHIPPED | OUTPATIENT
Start: 2022-12-09

## 2022-12-09 RX ORDER — METOPROLOL SUCCINATE 50 MG/1
TABLET, EXTENDED RELEASE ORAL
Qty: 90 TABLET | Refills: 1 | Status: SHIPPED | OUTPATIENT
Start: 2022-12-09

## 2022-12-13 ENCOUNTER — MED REC SCAN ONLY (OUTPATIENT)
Dept: FAMILY MEDICINE CLINIC | Facility: CLINIC | Age: 62
End: 2022-12-13

## 2022-12-29 NOTE — TELEPHONE ENCOUNTER
Augmentin 875mg twice daily with food x 7 days  Follow up if your symptoms persist or worsen   Entered labs we received from [facility]

## 2023-01-08 PROBLEM — Z15.09 BRCA2 POSITIVE: Status: ACTIVE | Noted: 2020-10-01

## 2023-01-08 PROBLEM — Z15.01 BRCA2 POSITIVE: Status: ACTIVE | Noted: 2020-10-01

## 2023-01-08 PROBLEM — H40.053 BILATERAL OCULAR HYPERTENSION: Status: ACTIVE | Noted: 2023-01-08

## 2023-01-09 ENCOUNTER — OFFICE VISIT (OUTPATIENT)
Dept: FAMILY MEDICINE CLINIC | Facility: CLINIC | Age: 63
End: 2023-01-09
Payer: COMMERCIAL

## 2023-01-09 VITALS
HEIGHT: 62 IN | OXYGEN SATURATION: 99 % | HEART RATE: 73 BPM | BODY MASS INDEX: 27.97 KG/M2 | DIASTOLIC BLOOD PRESSURE: 72 MMHG | TEMPERATURE: 97 F | SYSTOLIC BLOOD PRESSURE: 124 MMHG | RESPIRATION RATE: 18 BRPM | WEIGHT: 152 LBS

## 2023-01-09 DIAGNOSIS — E03.9 ACQUIRED HYPOTHYROIDISM: ICD-10-CM

## 2023-01-09 DIAGNOSIS — D50.9 IRON DEFICIENCY ANEMIA, UNSPECIFIED IRON DEFICIENCY ANEMIA TYPE: ICD-10-CM

## 2023-01-09 DIAGNOSIS — E55.9 VITAMIN D DEFICIENCY: ICD-10-CM

## 2023-01-09 DIAGNOSIS — Z71.85 VACCINE COUNSELING: ICD-10-CM

## 2023-01-09 DIAGNOSIS — Z90.13 S/P BILATERAL MASTECTOMY: ICD-10-CM

## 2023-01-09 DIAGNOSIS — Z85.43 HISTORY OF OVARIAN CANCER: ICD-10-CM

## 2023-01-09 DIAGNOSIS — I65.23 ASYMPTOMATIC CAROTID ARTERY STENOSIS, BILATERAL: ICD-10-CM

## 2023-01-09 DIAGNOSIS — F40.10 SOCIAL ANXIETY DISORDER: ICD-10-CM

## 2023-01-09 DIAGNOSIS — I10 ESSENTIAL HYPERTENSION: ICD-10-CM

## 2023-01-09 DIAGNOSIS — Z79.899 MEDICATION MANAGEMENT: ICD-10-CM

## 2023-01-09 DIAGNOSIS — E53.8 B12 DEFICIENCY: ICD-10-CM

## 2023-01-09 DIAGNOSIS — Z15.01 BRCA2 POSITIVE: ICD-10-CM

## 2023-01-09 DIAGNOSIS — Z15.09 BRCA2 POSITIVE: ICD-10-CM

## 2023-01-09 DIAGNOSIS — Z95.1 HX OF CABG: ICD-10-CM

## 2023-01-09 DIAGNOSIS — C56.1: ICD-10-CM

## 2023-01-09 DIAGNOSIS — Z91.09 ENVIRONMENTAL ALLERGIES: ICD-10-CM

## 2023-01-09 DIAGNOSIS — E11.9 CONTROLLED TYPE 2 DIABETES MELLITUS WITHOUT COMPLICATION, WITHOUT LONG-TERM CURRENT USE OF INSULIN (HCC): ICD-10-CM

## 2023-01-09 DIAGNOSIS — H40.053 BILATERAL OCULAR HYPERTENSION: Primary | ICD-10-CM

## 2023-01-09 DIAGNOSIS — E78.00 HYPERCHOLESTEREMIA: ICD-10-CM

## 2023-01-09 LAB
ALBUMIN: 4 G/DL (ref 3.5–5.7)
ALKALINE PHOSPHATASE: 84 (ref 34–104)
ALT: 9 (ref 7–52)
AMB EXT ANION GAP: 5 (ref 7–15)
AST: 14 (ref 13–39)
BILIRUBIN, TOTAL: 0.3 MG/DL (ref 0.3–1)
CALCIUM: 9.2 (ref 8.6–10.4)
CHLORIDE: 106 (ref 98–107)
CO2: 30 (ref 21–31)
EOS (ABSOLUTE): 0.01 X10E3/UL (ref 0–0.11)
GFR (CKD-EPI)CORRECTED: 100.1
GLUCOSE: 101 (ref 70–99)
HCT: 29.3 (ref 38–50)
HGB: 9.1 (ref 12.1–16.4)
Lab: 0.59 MG/DL (ref 0.6–1.2)
MEAN CELL VOLUME: 113.6 (ref 79–98)
MEAN CORPUSCULAR HEMOGLOBIN: 35.3 (ref 26–34)
MEAN CORPUSCULAR HGB CONC: 31.1 (ref 30.6–37)
MEAN PLATELET VOLUME: 0.02
NON HDL CHOL: 124
NUCRBC: 0.4
PLT: 91 (ref 150–400)
POTASSIUM: 3.8 (ref 3.5–5.1)
RED BLOOD COUNT: 2.58 (ref 4–5.6)
RED CELL DISTRIBUTION WIDTH: 0.4
SODIUM: 141 (ref 133–144)
TOTAL PROTEIN: 7.4 (ref 6.4–8.9)
TRIGLYCERIDES: 269 MG/DL (ref ?–149)
TSH: 3.4 UIU/ML (ref 0.27–4.2)
VLDL, CALCULATED (ARUP): 54 (ref 0–30)
WBC: 5.3 (ref 4.5–11)

## 2023-01-09 PROCEDURE — 90471 IMMUNIZATION ADMIN: CPT | Performed by: FAMILY MEDICINE

## 2023-01-09 PROCEDURE — 3078F DIAST BP <80 MM HG: CPT | Performed by: FAMILY MEDICINE

## 2023-01-09 PROCEDURE — 90632 HEPA VACCINE ADULT IM: CPT | Performed by: FAMILY MEDICINE

## 2023-01-09 PROCEDURE — 3074F SYST BP LT 130 MM HG: CPT | Performed by: FAMILY MEDICINE

## 2023-01-09 PROCEDURE — 90750 HZV VACC RECOMBINANT IM: CPT | Performed by: FAMILY MEDICINE

## 2023-01-09 PROCEDURE — 90472 IMMUNIZATION ADMIN EACH ADD: CPT | Performed by: FAMILY MEDICINE

## 2023-01-09 PROCEDURE — 3008F BODY MASS INDEX DOCD: CPT | Performed by: FAMILY MEDICINE

## 2023-01-09 PROCEDURE — 99215 OFFICE O/P EST HI 40 MIN: CPT | Performed by: FAMILY MEDICINE

## 2023-01-09 PROCEDURE — 90746 HEPB VACCINE 3 DOSE ADULT IM: CPT | Performed by: FAMILY MEDICINE

## 2023-01-10 ENCOUNTER — MED REC SCAN ONLY (OUTPATIENT)
Dept: FAMILY MEDICINE CLINIC | Facility: CLINIC | Age: 63
End: 2023-01-10

## 2023-03-07 DIAGNOSIS — I10 ESSENTIAL HYPERTENSION: ICD-10-CM

## 2023-03-07 RX ORDER — ROSUVASTATIN CALCIUM 40 MG/1
TABLET, COATED ORAL
Qty: 90 TABLET | Refills: 1 | Status: SHIPPED | OUTPATIENT
Start: 2023-03-07 | End: 2023-03-09

## 2023-03-09 DIAGNOSIS — I10 ESSENTIAL HYPERTENSION: ICD-10-CM

## 2023-03-09 LAB
AMB EXT ANION GAP: 8 (ref 6–15)
AMB EXT BILIRUBIN, TOTAL: 0.5 MG/DL (ref 0.3–1)
AMB EXT BUN: 7 MG/DL (ref 7–25)
AMB EXT CALCIUM: 9.1 (ref 8.6–10.4)
AMB EXT CARBON DIOXIDE: 28 (ref 21–31)
AMB EXT CHLORIDE: 104 (ref 98–107)
AMB EXT CMP ALT: 11 U/L (ref 7–52)
AMB EXT CMP AST: 18 U/L (ref 13–39)
AMB EXT CREATININE: 0.61 MG/DL (ref 0.6–1.2)
AMB EXT GLUCOSE: 93 MG/DL (ref 70–99)
AMB EXT HEMATOCRIT: 38.1 (ref 38–50)
AMB EXT HEMOGLOBIN: 11.9 (ref 12.1–16.4)
AMB EXT MCV: 110.8 (ref 79–98)
AMB EXT PLATELETS: 162 (ref 150–400)
AMB EXT POSTASSIUM: 3.6 MMOL/L (ref 3.5–5.1)
AMB EXT SODIUM: 140 MMOL/L (ref 133–144)
AMB EXT TOTAL PROTEIN: 7.4 (ref 6.4–8.9)
AMB EXT WBC: 4.9 X10(3)UL (ref 4.5–11)

## 2023-03-09 RX ORDER — ROSUVASTATIN CALCIUM 40 MG/1
TABLET, COATED ORAL
Qty: 90 TABLET | Refills: 0 | Status: SHIPPED | OUTPATIENT
Start: 2023-03-09

## 2023-03-10 ENCOUNTER — TELEPHONE (OUTPATIENT)
Dept: FAMILY MEDICINE CLINIC | Facility: CLINIC | Age: 63
End: 2023-03-10

## 2023-03-10 DIAGNOSIS — I10 ESSENTIAL HYPERTENSION: ICD-10-CM

## 2023-03-28 LAB
AMB EXT GLUCOSE: 108 MG/DL
AMB EXT HGBA1C: 5.4 % (ref ?–5.6)
AMB EXT MALB/CRE CALC: 38 UG/MG (ref 0–30)

## 2023-03-30 ENCOUNTER — TELEPHONE (OUTPATIENT)
Dept: INTERNAL MEDICINE CLINIC | Facility: CLINIC | Age: 63
End: 2023-03-30

## 2023-03-30 NOTE — TELEPHONE ENCOUNTER
Received labs from [place] [address] at [facility], entering results into external results.

## 2023-03-31 ENCOUNTER — TELEPHONE (OUTPATIENT)
Dept: FAMILY MEDICINE CLINIC | Facility: CLINIC | Age: 63
End: 2023-03-31

## 2023-03-31 NOTE — TELEPHONE ENCOUNTER
Pharmacy is requesting a refill of[de-identified]    Metroprolol Succinate    Pharmacy Fax # is [phone]

## 2023-03-31 NOTE — TELEPHONE ENCOUNTER
Contacted pharmacy, metoprolol refill already on file, representative stated she will cancel the refill request as refill already on file.     Yannick Martinez MD, 03/31/23, 9:54 AM

## 2023-04-02 PROBLEM — R93.89 ABNORMAL COMPUTED TOMOGRAPHY SCAN: Status: ACTIVE | Noted: 2021-12-20

## 2023-04-02 PROBLEM — Z85.3 HISTORY OF BREAST CANCER IN FEMALE: Status: ACTIVE | Noted: 2021-12-20

## 2023-04-02 PROBLEM — C78.6 PERITONEAL CARCINOMATOSIS (HCC): Status: ACTIVE | Noted: 2023-04-02

## 2023-04-02 PROBLEM — R19.00 PELVIC MASS: Status: ACTIVE | Noted: 2023-04-02

## 2023-04-03 ENCOUNTER — OFFICE VISIT (OUTPATIENT)
Dept: FAMILY MEDICINE CLINIC | Facility: CLINIC | Age: 63
End: 2023-04-03
Payer: COMMERCIAL

## 2023-04-03 VITALS
BODY MASS INDEX: 28.52 KG/M2 | RESPIRATION RATE: 18 BRPM | OXYGEN SATURATION: 98 % | HEIGHT: 62 IN | WEIGHT: 155 LBS | SYSTOLIC BLOOD PRESSURE: 122 MMHG | DIASTOLIC BLOOD PRESSURE: 74 MMHG | HEART RATE: 76 BPM

## 2023-04-03 DIAGNOSIS — G62.9 NEUROPATHY: Primary | ICD-10-CM

## 2023-04-03 DIAGNOSIS — M79.604 RIGHT LEG PAIN: ICD-10-CM

## 2023-04-03 DIAGNOSIS — C78.6 PERITONEAL CARCINOMATOSIS (HCC): ICD-10-CM

## 2023-04-03 DIAGNOSIS — C56.1 MALIGNANT NEOPLASM OF RIGHT OVARY (HCC): ICD-10-CM

## 2023-04-18 ENCOUNTER — TELEPHONE (OUTPATIENT)
Dept: PHYSICAL THERAPY | Facility: HOSPITAL | Age: 63
End: 2023-04-18

## 2023-04-22 LAB
AMB EXT ANION GAP: 5 (ref 6–15)
AMB EXT BILIRUBIN, TOTAL: 0.3 MG/DL (ref 0.3–1)
AMB EXT BUN: 11 MG/DL (ref 7–25)
AMB EXT CALCIUM: 8.7 (ref 8.6–10.4)
AMB EXT CARBON DIOXIDE: 30 (ref 21–31)
AMB EXT CHLORIDE: 106 (ref 98–107)
AMB EXT CMP ALT: 29 U/L (ref 7–52)
AMB EXT CMP AST: 38 U/L (ref 13–39)
AMB EXT CREATININE, URINE: 106 MG/DL (ref 8–198)
AMB EXT CREATININE: 0.71 MG/DL (ref 0.6–1.2)
AMB EXT GLUCOSE: 97 MG/DL (ref 70–99)
AMB EXT HEMATOCRIT: 39.7 (ref 38–50)
AMB EXT HEMOGLOBIN: 12.2 (ref 12.1–16.4)
AMB EXT MCV: 106.7 (ref 79–98)
AMB EXT PLATELETS: 148 (ref 150–400)
AMB EXT POSTASSIUM: 4.2 MMOL/L (ref 3.5–5.1)
AMB EXT SODIUM: 141 MMOL/L (ref 133–144)
AMB EXT TOTAL PROTEIN: 7 (ref 6.4–8.9)
AMB EXT WBC: 3.7 X10(3)UL (ref 4.5–11)

## 2023-04-24 ENCOUNTER — TELEPHONE (OUTPATIENT)
Dept: FAMILY MEDICINE CLINIC | Facility: CLINIC | Age: 63
End: 2023-04-24

## 2023-04-24 ENCOUNTER — MED REC SCAN ONLY (OUTPATIENT)
Dept: FAMILY MEDICINE CLINIC | Facility: CLINIC | Age: 63
End: 2023-04-24

## 2023-04-24 DIAGNOSIS — E53.8 LOW VITAMIN B12 LEVEL: Primary | ICD-10-CM

## 2023-04-24 LAB — VITAMIN B12: 186 PG/ML (ref 180–914)

## 2023-04-28 ENCOUNTER — TELEPHONE (OUTPATIENT)
Dept: FAMILY MEDICINE CLINIC | Facility: CLINIC | Age: 63
End: 2023-04-28

## 2023-05-24 ENCOUNTER — TELEPHONE (OUTPATIENT)
Dept: FAMILY MEDICINE CLINIC | Facility: CLINIC | Age: 63
End: 2023-05-24

## 2023-05-25 ENCOUNTER — MED REC SCAN ONLY (OUTPATIENT)
Dept: FAMILY MEDICINE CLINIC | Facility: CLINIC | Age: 63
End: 2023-05-25

## 2023-06-05 ENCOUNTER — MED REC SCAN ONLY (OUTPATIENT)
Dept: FAMILY MEDICINE CLINIC | Facility: CLINIC | Age: 63
End: 2023-06-05

## 2023-06-20 DIAGNOSIS — C56.9 OVARIAN CANCER (CMD): Primary | ICD-10-CM

## 2023-07-03 ENCOUNTER — OFFICE VISIT (OUTPATIENT)
Dept: FAMILY MEDICINE CLINIC | Facility: CLINIC | Age: 63
End: 2023-07-03
Payer: COMMERCIAL

## 2023-07-03 VITALS
BODY MASS INDEX: 29.81 KG/M2 | RESPIRATION RATE: 18 BRPM | DIASTOLIC BLOOD PRESSURE: 76 MMHG | HEIGHT: 62 IN | HEART RATE: 70 BPM | WEIGHT: 162 LBS | OXYGEN SATURATION: 98 % | SYSTOLIC BLOOD PRESSURE: 124 MMHG

## 2023-07-03 DIAGNOSIS — Z85.3 HISTORY OF BREAST CANCER IN FEMALE: ICD-10-CM

## 2023-07-03 DIAGNOSIS — E53.8 VITAMIN B12 DEFICIENCY: ICD-10-CM

## 2023-07-03 DIAGNOSIS — E78.5 DYSLIPIDEMIA: ICD-10-CM

## 2023-07-03 DIAGNOSIS — Z90.13 S/P BILATERAL MASTECTOMY: ICD-10-CM

## 2023-07-03 DIAGNOSIS — Z86.39 HISTORY OF DIABETES MELLITUS: ICD-10-CM

## 2023-07-03 DIAGNOSIS — E03.9 ACQUIRED HYPOTHYROIDISM: ICD-10-CM

## 2023-07-03 DIAGNOSIS — C56.1: ICD-10-CM

## 2023-07-03 DIAGNOSIS — I10 ESSENTIAL HYPERTENSION: ICD-10-CM

## 2023-07-03 DIAGNOSIS — Z15.01 BRCA2 POSITIVE: ICD-10-CM

## 2023-07-03 DIAGNOSIS — C56.1 MALIGNANT NEOPLASM OF RIGHT OVARY (HCC): ICD-10-CM

## 2023-07-03 DIAGNOSIS — Z15.09 BRCA2 POSITIVE: ICD-10-CM

## 2023-07-03 DIAGNOSIS — Z85.43 HISTORY OF OVARIAN CANCER: ICD-10-CM

## 2023-07-03 DIAGNOSIS — G62.9 NEUROPATHY: Primary | ICD-10-CM

## 2023-07-03 DIAGNOSIS — H40.053 BILATERAL OCULAR HYPERTENSION: ICD-10-CM

## 2023-07-03 DIAGNOSIS — M79.604 RIGHT LEG PAIN: ICD-10-CM

## 2023-07-03 PROCEDURE — 3078F DIAST BP <80 MM HG: CPT | Performed by: FAMILY MEDICINE

## 2023-07-03 PROCEDURE — 3074F SYST BP LT 130 MM HG: CPT | Performed by: FAMILY MEDICINE

## 2023-07-03 PROCEDURE — 99213 OFFICE O/P EST LOW 20 MIN: CPT | Performed by: FAMILY MEDICINE

## 2023-07-03 PROCEDURE — 3008F BODY MASS INDEX DOCD: CPT | Performed by: FAMILY MEDICINE

## 2023-08-24 LAB
AMB EXT ANION GAP: 7
AMB EXT BILIRUBIN, TOTAL: 0.4 MG/DL
AMB EXT BUN: 12 MG/DL
AMB EXT CALCIUM: 9.4
AMB EXT CARBON DIOXIDE: 27
AMB EXT CHLORIDE: 104
AMB EXT CHOL/HDL RATIO: 5
AMB EXT CHOLESTEROL, TOTAL: 157 MG/DL
AMB EXT CMP ALT: 20 U/L (ref 7–52)
AMB EXT CMP AST: 24 U/L (ref 13–39)
AMB EXT CREATININE: 0.84 MG/DL
AMB EXT GLUCOSE: 103 MG/DL
AMB EXT HDL CHOLESTEROL: 34 MG/DL
AMB EXT HEMATOCRIT: 40.8 (ref 38–50)
AMB EXT HEMOGLOBIN: 12.7 (ref 12.1–16.4)
AMB EXT LDL CHOLESTEROL, DIRECT: 80 MG/DL
AMB EXT MCV: 105.4 (ref 79–98)
AMB EXT PLATELETS: 146 (ref 150–400)
AMB EXT POSTASSIUM: 4.1 MMOL/L (ref 3.5–5.1)
AMB EXT SODIUM: 138 MMOL/L (ref 133–144)
AMB EXT TOTAL PROTEIN: 7.1 (ref 6.4–8.9)
AMB EXT TRIGLYCERIDES: 212 MG/DL
AMB EXT TSH: 6 MIU/ML
AMB EXT VLDL: 42 MG/DL
AMB EXT WBC: 3.9 X10(3)UL

## 2023-08-25 ENCOUNTER — TELEPHONE (OUTPATIENT)
Dept: FAMILY MEDICINE CLINIC | Facility: CLINIC | Age: 63
End: 2023-08-25

## 2023-09-08 NOTE — TELEPHONE ENCOUNTER
Call back from pt-sts she viewed lab results. Advised of gordon ORTIZ comments--correcting free T4 result to 1.23 and free T3 2.89, per faxed copy of 10/18/19 [email or web address]. Provided med and repeat lab instructions.  Pt confirms current levothyroxine is 25 mc
Call to pt's cell reaches identified voice mail. Left vmm req call back to triage nurse today for test results/gordon instructions. Provided ofc phone hours.   No new med order sent until we speak w pt, as record shows current dose is 25 mcg-may have enoug
Incoming call from patient, information and recommendations discussed with patient, patient voiced understanding, agreed with plan.
Lm on vm to cb. Repeat labs ordered. No RX sent yet.
Lm to [facility]. Please see note from [facility].
Received labs from [facility]. Lipids--stable  TSH- elevated at 4.590, T4 Free- 1.230, T4 Free- 2.89. Increase Levothyroxine to 50 mcg daily before breakfast--please send rx. Repeat TSH/T4 in 1 month.       A1c is 5.8- continue to focus on healthy eating, ex
Will continue Levothyroxine through chemo and monitor her TSH/T4 once she has completed chemo- she may not need to continue the Levothyroxine but will see what her thyroid labs are once completed.  She does have a history of one of her thyroid antibodies be
present

## 2023-10-06 ENCOUNTER — MED REC SCAN ONLY (OUTPATIENT)
Dept: FAMILY MEDICINE CLINIC | Facility: CLINIC | Age: 63
End: 2023-10-06

## 2023-10-17 DIAGNOSIS — I10 ESSENTIAL HYPERTENSION: ICD-10-CM

## 2023-10-17 RX ORDER — ROSUVASTATIN CALCIUM 40 MG/1
40 TABLET, COATED ORAL NIGHTLY
Qty: 90 TABLET | Refills: 1 | Status: SHIPPED | OUTPATIENT
Start: 2023-10-17

## 2023-10-17 NOTE — TELEPHONE ENCOUNTER
Last office visit: 7/3/2023   Protocol: pass  Requested medication(s) are due for refill today: yes  Requested medication(s) are on the active medication list same strength, form, dose/ sig: yes  Requested medication(s) are managed by provider: yes  Patient has already received a courtsey refill: no

## 2023-10-23 DIAGNOSIS — I10 ESSENTIAL HYPERTENSION: ICD-10-CM

## 2023-10-23 DIAGNOSIS — E03.9 HYPOTHYROIDISM, UNSPECIFIED TYPE: ICD-10-CM

## 2023-10-24 RX ORDER — LEVOTHYROXINE SODIUM 0.03 MG/1
25 TABLET ORAL
Qty: 90 TABLET | Refills: 1 | Status: SHIPPED | OUTPATIENT
Start: 2023-10-24

## 2023-10-24 RX ORDER — METOPROLOL SUCCINATE 50 MG/1
50 TABLET, EXTENDED RELEASE ORAL DAILY
Qty: 90 TABLET | Refills: 1 | Status: SHIPPED | OUTPATIENT
Start: 2023-10-24

## 2023-10-26 ENCOUNTER — TELEPHONE (OUTPATIENT)
Dept: FAMILY MEDICINE CLINIC | Facility: CLINIC | Age: 63
End: 2023-10-26

## 2023-10-26 NOTE — TELEPHONE ENCOUNTER
Received call from [facility] to clarify metoprolol and levothyroxine prescriptions that were sent 10/24/23. No further questions.

## 2024-01-05 ENCOUNTER — TELEPHONE (OUTPATIENT)
Dept: FAMILY MEDICINE CLINIC | Facility: CLINIC | Age: 64
End: 2024-01-05

## 2024-01-05 DIAGNOSIS — E55.9 VITAMIN D DEFICIENCY: ICD-10-CM

## 2024-01-05 DIAGNOSIS — Z86.39 HISTORY OF DIABETES MELLITUS: ICD-10-CM

## 2024-01-05 DIAGNOSIS — Z00.00 LABORATORY EXAMINATION ORDERED AS PART OF A ROUTINE GENERAL MEDICAL EXAMINATION: ICD-10-CM

## 2024-01-05 DIAGNOSIS — E78.5 DYSLIPIDEMIA: ICD-10-CM

## 2024-01-05 DIAGNOSIS — E03.9 HYPOTHYROIDISM, UNSPECIFIED TYPE: Primary | ICD-10-CM

## 2024-01-05 NOTE — TELEPHONE ENCOUNTER
Pt is requesting annual labs be printed and placed at the  before her visit.    Pt aware Dr. Hinojosa is not in the office today.     Future Appointments   Date Time Provider Department Center   1/22/2024  7:00 AM Cecille Hinojosa DO [facility]

## 2024-01-08 NOTE — TELEPHONE ENCOUNTER
Last Ov 7/3/23      next OV 1/22/24PE     Pt requests   lab orders for upcoming appointment 1/22/24  Orders pended for your review?

## 2024-01-19 PROBLEM — R53.83 FATIGUE: Status: ACTIVE | Noted: 2023-09-19

## 2024-01-22 ENCOUNTER — OFFICE VISIT (OUTPATIENT)
Dept: FAMILY MEDICINE CLINIC | Facility: CLINIC | Age: 64
End: 2024-01-22
Payer: COMMERCIAL

## 2024-01-22 VITALS
OXYGEN SATURATION: 99 % | DIASTOLIC BLOOD PRESSURE: 72 MMHG | WEIGHT: 160 LBS | BODY MASS INDEX: 29.44 KG/M2 | HEIGHT: 62 IN | SYSTOLIC BLOOD PRESSURE: 120 MMHG | HEART RATE: 59 BPM | RESPIRATION RATE: 18 BRPM

## 2024-01-22 DIAGNOSIS — C56.1 MALIGNANT NEOPLASM OF RIGHT OVARY (HCC): ICD-10-CM

## 2024-01-22 DIAGNOSIS — Z00.00 ROUTINE GENERAL MEDICAL EXAMINATION AT A HEALTH CARE FACILITY: Primary | ICD-10-CM

## 2024-01-22 DIAGNOSIS — R05.3 CHRONIC COUGH: ICD-10-CM

## 2024-01-22 DIAGNOSIS — Z78.0 MENOPAUSE: ICD-10-CM

## 2024-01-22 DIAGNOSIS — J90 PLEURAL EFFUSION: ICD-10-CM

## 2024-01-22 PROCEDURE — 3008F BODY MASS INDEX DOCD: CPT | Performed by: FAMILY MEDICINE

## 2024-01-22 PROCEDURE — 3078F DIAST BP <80 MM HG: CPT | Performed by: FAMILY MEDICINE

## 2024-01-22 PROCEDURE — 3074F SYST BP LT 130 MM HG: CPT | Performed by: FAMILY MEDICINE

## 2024-01-22 PROCEDURE — 99397 PER PM REEVAL EST PAT 65+ YR: CPT | Performed by: FAMILY MEDICINE

## 2024-01-22 NOTE — H&P
CHIEF COMPLAINT   Complete physical  HPI:   Zully Finn is a 64 year old female who presents for a complete physical exam.    for pap/breast/pelvic.   Has implants for her breasts -- Dr. HAILEY Holder  -- sees once yearly     Pt. Complains of chronic cough.  Feels it is driven by vagus nerve and anxiety.  Usually lasts 2-3 months.  Started 11/2023.       Wt Readings from Last 6 Encounters:   01/22/24 160 lb (72.6 kg)   07/03/23 162 lb (73.5 kg)   04/03/23 155 lb (70.3 kg)   01/09/23 152 lb (68.9 kg)   11/29/22 155 lb 8 oz (70.5 kg)   09/12/22 160 lb (72.6 kg)     Body mass index is 29.26 kg/m².     Cholesterol, Total (mg/dL)   Date Value   08/24/2023 157   08/22/2022 150   12/04/2020 111   10/29/2018 151   06/08/2018 181   02/05/2007 199     CHOLESTEROL, TOTAL (mg/dL)   Date Value   09/23/2017 338 (H)   12/07/2016 229 (H)   03/16/2016 270 (H)     HDL Cholesterol (mg/dL)   Date Value   08/24/2023 34   08/22/2022 25 (A)   12/04/2020 33 (L)   10/29/2018 25 (L)   06/08/2018 29 (L)   02/05/2007 36 (L)     HDL CHOLESTEROL (mg/dL)   Date Value   09/23/2017 37 (L)   12/07/2016 29 (L)   03/16/2016 42 (L)     LDL Cholesterol Calc (mg/dL)   Date Value   02/05/2007 145 (H)     LDL Cholesterol (mg/dL)   Date Value   12/04/2020 54   10/29/2018 80   06/08/2018 116     LDL-CHOLESTEROL (mg/dL (calc))   Date Value   09/23/2017 257 (H)   12/07/2016 173 (H)   03/16/2016 194 (H)     Triglycerides (mg/dL)   Date Value   01/06/2023 269 (A)   02/05/2007 91     AST (SGOT) (IU/L)   Date Value   02/05/2007 36     AST   Date Value   08/24/2023 24 U/L   04/22/2023 38 U/L   03/09/2023 18 U/L   01/06/2023 14   12/04/2020 26 U/L   09/04/2020 29 U/L   07/13/2020 23 U/L   09/23/2017 21 U/L   12/07/2016 23 U/L     ALT (SGPT) (IU/L)   Date Value   02/05/2007 30     ALT   Date Value   08/24/2023 20 U/L   04/22/2023 29 U/L   03/09/2023 11 U/L   01/06/2023 9   12/04/2020 26 U/L   09/04/2020 29 U/L   07/13/2020 25 U/L   09/23/2017 15 U/L    12/07/2016 20 U/L        Current Outpatient Medications   Medication Sig Dispense Refill    levothyroxine 25 MCG Oral Tab Take 1 tablet (25 mcg total) by mouth before breakfast. 90 tablet 1    metoprolol succinate ER 50 MG Oral Tablet 24 Hr Take 1 tablet (50 mg total) by mouth daily. 90 tablet 1    ROSUVASTATIN 40 MG Oral Tab TAKE ONE TABLET BY MOUTH AT BEDTIME 90 tablet 1    prochlorperazine (COMPAZINE) 10 mg tablet       NON FORMULARY FM Formula + TR 0.0125%      NON FORMULARY FM Formula + TR 0.0125%      loratadine 10 MG Oral Tab Take 1 tablet (10 mg total) by mouth daily.      aspirin 81 MG Oral Tab Take 1 tablet (81 mg total) by mouth daily.      REPATHA SURECLICK 140 MG/ML Subcutaneous Solution Auto-injector Inject 140 mg into the skin every 14 (fourteen) days.          Allergies   Allergen Reactions    Fentanyl CONFUSION and HYPOTENSION     Felt \"loopy\"    Clindamycin DIARRHEA and NAUSEA ONLY     Rectal bleeding    Seasonal Runny nose     seasonal      Past Medical History:   Diagnosis Date    Abdominal distention     Abdominal pain     Acute colitis 11/19/2013    severe w/ulceration    Anemia 6/2020    Anxiety     Atherosclerosis of coronary artery 02/15/2018    per angiogram    Back problem     Bloating     Blood in the stool     Borderline diabetes     Dx in 3/2018: HgA1C 6%    Cancer (HCC) 06/2018    ovarian; surgery and chemo done    Change in stool     Colitis     gets irritated with antibiotics    Colonic obstruction (HCC)     @ 45 cm d/t edema    Constipation     Coronary atherosclerosis     Depression     Diarrhea 11/19/2013    Diarrhea, unspecified     Diverticulitis     Diverticulosis 11/19/2013    Fibrocystic breast disease     Fibroid uterus     Food intolerance     Gastrointestinal disorder     colitis    Hemorrhoids     High blood pressure     High cholesterol     High coronary artery calcium score     History of blood transfusion     7/2020    Hyperlipidemia LDL goal <70     Hypertension  5/3/2013    Hypertension, essential, benign     Intestinal bleeding 11/19/2013    Irregular bowel habits 6/2020    Metabolic syndrome     Personal history of antineoplastic chemotherapy     8/2018     Prediabetes     Proctosigmoiditis     very advanced    S/P CABG x 2     On 3/22/2018: CABG x 2 (LIMA to LAD and SVG to RCA)    Seasonal allergies     Social anxiety disorder 5/3/2013    Visual impairment     glasses      Past Surgical History:   Procedure Laterality Date    ANGIOGRAM  02/15/2018    BYPASS SURGERY  03/22/2018    CABG x 2    CABG      On 3/22/2018: CABG x 2 (LIMA to LAD and SVG to RCA)    COLONOSCOPY  age 50    DR. Carrero. benign polyps. Tics. Repeat in 3 years    COLONOSCOPY N/A 03/30/2017    Procedure: COLONOSCOPY;  Surgeon: Alejandro Taylor MD;  Location: [facility] ENDOSCOPY    COLONOSCOPY WITH BIOPSY  11/19/2013    HYSTERECTOMY      SHAMIKA BIOPSY STEREO NODULE 1 SITE RIGHT (CPT=19081)  03/2016    Atypical hyperplasia no surgery performed    MASTECTOMY LEFT Bilateral 10/2020    ORAL SURGERY PROCEDURE  1980    wisdom teeth    TONSILLECTOMY      TONSILLECTOMY  1969    as a child    TOTAL ABDOM HYSTERECTOMY        Family History   Problem Relation Age of Onset    Stroke Mother     Diabetes Mother     Thyroid Disorder Mother     High Blood Pressure Mother     Heart Disorder Mother         HF    Diabetes Father     Heart Attack Father     High Blood Pressure Father     Diabetes Brother     Melanoma Brother     Breast Cancer Paternal Aunt         Dx approx 49 y/o      Social History:   Social History     Socioeconomic History    Marital status:    Tobacco Use    Smoking status: Never    Smokeless tobacco: Never   Vaping Use    Vaping Use: Never used   Substance and Sexual Activity    Alcohol use: Yes     Alcohol/week: 1.0 standard drink of alcohol     Types: 1 Standard drinks or equivalent per week     Comment: socially    Drug use: No   Other Topics Concern    Caffeine Concern No     Comment: 1 cup daily      Exercise Yes    Seat Belt Yes        REVIEW OF SYSTEMS:   GENERAL: feels well otherwise  SKIN: denies any unusual skin lesions  EYES:denies blurred vision or double vision  HEENT: denies nasal congestion, sinus pain or ST  LUNGS: denies shortness of breath with exertion  CARDIOVASCULAR: denies chest pain on exertion  GI: denies abdominal pain,denies heartburn  : denies dysuria, vaginal discharge or itching  MUSCULOSKELETAL: denies back pain  NEURO: denies headaches  PSYCHE: worried about angiogram  HEMATOLOGIC: denies hx of anemia  ENDOCRINE: denies thyroid history    EXAM:   /72 (BP Location: Left arm, Patient Position: Sitting, Cuff Size: adult)   Pulse 59   Resp 18   Ht 5' 2\" (1.575 m)   Wt 160 lb (72.6 kg)   LMP 04/08/2010   SpO2 99%   BMI 29.26 kg/m²   Body mass index is 29.26 kg/m².   GENERAL: well developed, well nourished,in no apparent distress  SKIN: no rashes,no suspicious lesions  HEENT: atraumatic, normocephalic,ears clear; wearing mask  EYES: EOMI, conjunctiva clear  NECK: supple,no adenopathy,no bruits, no thyroid masses. No palpable masses.  BREAST:DEFERRED TO GYNE  LUNGS: clear to auscultation; no rhonchi, rales, or wheezing  CARDIO: RRR without murmur  GI: good BS's,no masses, organomegaly or tenderness  :DEFERRED TO GYNE   MUSCULOSKELETAL: No obvious joint deformity or swelling.  Normal gait.  EXTREMITIES: no cyanosis, clubbing or edema  NEURO: Oriented times three,cranial nerves are grossly intact,motor and sensory are grossly intact  Bilateral barefoot skin diabetic exam is normal, visualized feet and the appearance is normal.  Bilateral monofilament/sensation of both feet is normal.  Pulsation pedal pulse exam of both lower legs/feet is normal as well.       ASSESSMENT AND PLAN:   Zully Finn is a 64 year old female who presents for a complete physical exam.  Pap and pelvic deferred to gyne per patient request. Reviewed diet and exercise.   Pt' s weight is Body mass index  is 29.26 kg/m².. Recommended low fat diet and aerobic exercise 30 minutes three times weekly.  The patient indicates understanding of these issues and agrees to the plan.   Encounter Diagnoses   Name Primary?    Routine general medical examination at a health care facility Yes    Menopause     Chronic cough     Pleural effusion     Malignant neoplasm of right ovary (HCC)        No orders of the defined types were placed in this encounter.      Meds & Refills for this Visit:  Requested Prescriptions      No prescriptions requested or ordered in this encounter       Imaging & Consults:  PULMONARY - INTERNAL  XR DEXA BONE DENSITOMETRY (CPT=77080)    Last eye exam -- 5/2023  Lasr dental exam -- 6/2023    Advised against  RSV  -- pt. Immunocomprimised.  HOLD MMR due to live vaccine.  Labs ordered.  Colonoscopy due in 2023.  Deferred for now due to medical condition.  Dr. AISSATOU Dotson -- derm  - -sees her annually - -due in March 2024  A1c 2 times was in DM but recently not diabetic.

## 2024-01-25 ENCOUNTER — TELEPHONE (OUTPATIENT)
Dept: FAMILY MEDICINE CLINIC | Facility: CLINIC | Age: 64
End: 2024-01-25

## 2024-01-25 LAB
AMB EXT ANION GAP: 10
AMB EXT BILIRUBIN URINE: NEGATIVE
AMB EXT BILIRUBIN, TOTAL: 0.6 MG/DL
AMB EXT BLOOD URINE: NEGATIVE
AMB EXT BUN: 11 MG/DL
AMB EXT CALCIUM: 8.8
AMB EXT CARBON DIOXIDE: 26
AMB EXT CHLORIDE: 104
AMB EXT CMP ALT: 45 U/L (ref 7–52)
AMB EXT CMP AST: 53 U/L (ref 13–39)
AMB EXT CREATININE, URINE: 143.63 MG/DL (ref 8–198)
AMB EXT CREATININE: 0.66 MG/DL
AMB EXT EGFR NON-AA: 97.5
AMB EXT GLUCOSE URINE: NEGATIVE
AMB EXT GLUCOSE: 103 MG/DL
AMB EXT GLUCOSE: 103 MG/DL
AMB EXT HEMATOCRIT: 29.7 (ref 38–50)
AMB EXT HEMOGLOBIN: 9.7 (ref 12.1–16.4)
AMB EXT HGBA1C: 6.1 %
AMB EXT KETONES URINE: NEGATIVE
AMB EXT MALB URINE CALC: 31 MG/24HR
AMB EXT MALB/CRE CALC: 216.5 UG/MG
AMB EXT MCV: 96.7 (ref 79–98)
AMB EXT NITRITE URINE: NEGATIVE
AMB EXT PH URINE: 6.5
AMB EXT PLATELETS: 14 (ref 150–400)
AMB EXT POSTASSIUM: 3.3 MMOL/L (ref 3.5–5.1)
AMB EXT PROTEIN URINE: 20
AMB EXT SODIUM: 140 MMOL/L (ref 133–144)
AMB EXT TOTAL PROTEIN: 7.1 (ref 6.4–8.9)
AMB EXT TOTAL VITAMIN D: 20.7 (ref 30–100)
AMB EXT TSH: 6.03 MIU/ML
AMB EXT URINE CLARITY: CLEAR
AMB EXT URINE COLOR: YELLOW
AMB EXT UROBILINOGEN URINE: 6
AMB EXT WBC: 1.4 X10(3)UL

## 2024-01-25 NOTE — TELEPHONE ENCOUNTER
Critical labs reported to Dr Harris.  Contacted Dr 's office, critical labs reported to Nurse guardado and faxed to [facility] oncology at [phone].  Fax confirmation received.

## 2024-01-25 NOTE — TELEPHONE ENCOUNTER
Received call [facility] lab-[place]: Critical Results  WBC 1.40  Plt 14.0    Lab will fax results to our office.

## 2024-01-25 NOTE — TELEPHONE ENCOUNTER
Lab results Fax recived from [facility].  Placed in Dr Hinojosa's bin for review

## 2024-01-26 ENCOUNTER — TELEPHONE (OUTPATIENT)
Dept: FAMILY MEDICINE CLINIC | Facility: CLINIC | Age: 64
End: 2024-01-26

## 2024-02-06 ENCOUNTER — PATIENT OUTREACH (OUTPATIENT)
Dept: CASE MANAGEMENT | Age: 64
End: 2024-02-06

## 2024-02-06 NOTE — PROCEDURES
The office order for Diabetes registry removal request is Approved and finalized on February 6, 2024.    Thanks,  [facility] Team

## 2024-02-07 ENCOUNTER — TELEPHONE (OUTPATIENT)
Dept: FAMILY MEDICINE CLINIC | Facility: CLINIC | Age: 64
End: 2024-02-07

## 2024-02-07 DIAGNOSIS — R05.3 HABITUAL COUGH: Primary | ICD-10-CM

## 2024-02-07 DIAGNOSIS — R05.3 CHRONIC COUGH: ICD-10-CM

## 2024-02-07 NOTE — TELEPHONE ENCOUNTER
Per pt you talked about gabapentin for poss tx for her neurogenic cough   She said that Dr. Vera (onco) is ok with it   If you can prescribed?     Pls advise   Thank you!

## 2024-02-07 NOTE — TELEPHONE ENCOUNTER
Sure -- lets start gabapentin 100 mg nightly x 4 days then 200 mg nightly x 4 day and then 300 mg nightly and see if this improves the cough

## 2024-02-08 RX ORDER — GABAPENTIN 100 MG/1
CAPSULE ORAL
Qty: 90 CAPSULE | Refills: 0 | Status: SHIPPED | OUTPATIENT
Start: 2024-02-08

## 2024-03-04 ENCOUNTER — PATIENT MESSAGE (OUTPATIENT)
Dept: FAMILY MEDICINE CLINIC | Facility: CLINIC | Age: 64
End: 2024-03-04

## 2024-03-04 ENCOUNTER — TELEPHONE (OUTPATIENT)
Dept: FAMILY MEDICINE CLINIC | Facility: CLINIC | Age: 64
End: 2024-03-04

## 2024-03-04 NOTE — TELEPHONE ENCOUNTER
Pt is calling because she feel the following medication is not helping. She would like to wean off of it:    gabapentin 100 MG Oral Cap

## 2024-03-04 NOTE — TELEPHONE ENCOUNTER
Last OV:  1/22/24  PE               Next OV: 7/22/24 6 month med check    Left vm to return call re  current gabapentin dose    Pt sent Mychart reply:  \"I took my last 3 pills once daily on Saturday night. I was scheduled to go to 4 pills once daily Sunday night but instead took 2. I was planning to do that for 4 nights and then go to 1 pill for 4 nights and then stopping. Let me know if that’s wrong. \"

## 2024-03-04 NOTE — TELEPHONE ENCOUNTER
From: Zully Finn  To: Cecille Ashish  Sent: 3/4/2024 4:38 PM CST  Subject: Gabapentin     Hello, I’m returning Niharika’s call regarding weaning off gabapentin. I took my last 3 pills once daily on Saturday night. I was scheduled to go to 4 pills once daily Sunday night but instead took 2. I was planning to do that for 4 nights and then go to 1 pill for 4 nights and then stopping. Let me know if that’s wrong. Thank you!

## 2024-04-14 DIAGNOSIS — E03.9 HYPOTHYROIDISM, UNSPECIFIED TYPE: ICD-10-CM

## 2024-04-14 DIAGNOSIS — I10 ESSENTIAL HYPERTENSION: ICD-10-CM

## 2024-04-15 RX ORDER — LEVOTHYROXINE SODIUM 0.03 MG/1
25 TABLET ORAL
Qty: 90 TABLET | Refills: 1 | Status: SHIPPED | OUTPATIENT
Start: 2024-04-15

## 2024-04-15 RX ORDER — METOPROLOL SUCCINATE 50 MG/1
50 TABLET, EXTENDED RELEASE ORAL DAILY
Qty: 90 TABLET | Refills: 1 | Status: SHIPPED | OUTPATIENT
Start: 2024-04-15

## 2024-04-15 NOTE — TELEPHONE ENCOUNTER
Last office visit: 01/22/24   Protocol: pass    Requested medication(s) are due for refill today: Yes    Requested medication(s) are on the active medication list same strength, form, dose/ sig: Yes    Requested medication(s) are managed by provider: Yes    Patient has already received a courtsey refill: No    NOV: 07/22/24

## 2024-04-22 ENCOUNTER — TELEPHONE (OUTPATIENT)
Dept: FAMILY MEDICINE CLINIC | Facility: CLINIC | Age: 64
End: 2024-04-22

## 2024-04-22 RX ORDER — BENZONATATE 200 MG/1
200 CAPSULE ORAL 3 TIMES DAILY PRN
Qty: 30 CAPSULE | Refills: 1 | Status: SHIPPED | OUTPATIENT
Start: 2024-04-22

## 2024-04-22 NOTE — TELEPHONE ENCOUNTER
Rx order placed. Pt informed that Rx was sent to [facility], reviewed instructions,  onset of action is 15-20 minutes and effective for 3-8 hrs. Pt verbalized understanding to call office if cough worsens or persists.

## 2024-04-22 NOTE — TELEPHONE ENCOUNTER
Last OV : 1/22/24  PE         Next OV: 7/22/24 Med check    Pt is still having spasmodic cough,  mostly in the evening and when talking  She has experienced it in the past and it usually lasts 2-3 months  This episode started 11/2023  She weaned off gabapentin  in March because it wasn't helping  No worse since stopping gabapentin    She asks if there anything Dr HOUSE  could prescribe that might help?

## 2024-05-13 LAB
AMB EXT ANION GAP: 5
AMB EXT BILIRUBIN, TOTAL: 0.9 MG/DL
AMB EXT BUN: 12 MG/DL
AMB EXT CALCIUM: 8.2
AMB EXT CARBON DIOXIDE: 28
AMB EXT CHLORIDE: 109
AMB EXT CHOL/HDL RATIO: 3.1
AMB EXT CHOLESTEROL, TOTAL: 65 MG/DL
AMB EXT CMP ALT: 52 U/L
AMB EXT CMP AST: 74 U/L
AMB EXT CREATININE: 1 MG/DL
AMB EXT EGFR NON-AA: 62.6
AMB EXT HDL CHOLESTEROL: 21 MG/DL
AMB EXT LDL CHOLESTEROL, DIRECT: 24 MG/DL
AMB EXT NON HDL CHOL: 44 MG/DL
AMB EXT POSTASSIUM: 3.4 MMOL/L
AMB EXT SODIUM: 142 MMOL/L
AMB EXT TOTAL PROTEIN: 5.9
AMB EXT TRIGLYCERIDES: 101 MG/DL

## 2024-05-16 ENCOUNTER — TELEPHONE (OUTPATIENT)
Dept: FAMILY MEDICINE CLINIC | Facility: CLINIC | Age: 64
End: 2024-05-16

## 2024-05-16 ENCOUNTER — MED REC SCAN ONLY (OUTPATIENT)
Dept: FAMILY MEDICINE CLINIC | Facility: CLINIC | Age: 64
End: 2024-05-16

## 2024-05-16 NOTE — TELEPHONE ENCOUNTER
Received patients lab work result from [facility], updated results to patients chart.

## 2024-08-05 ENCOUNTER — TELEPHONE (OUTPATIENT)
Dept: FAMILY MEDICINE CLINIC | Facility: CLINIC | Age: 64
End: 2024-08-05

## 2024-10-26 DIAGNOSIS — I10 ESSENTIAL HYPERTENSION: ICD-10-CM

## 2024-10-28 RX ORDER — METOPROLOL SUCCINATE 50 MG/1
50 TABLET, EXTENDED RELEASE ORAL DAILY
Qty: 90 TABLET | Refills: 1 | Status: SHIPPED | OUTPATIENT
Start: 2024-10-28

## 2024-10-28 NOTE — TELEPHONE ENCOUNTER
Last office visit: 01/22/24   Protocol: pass    Requested medication(s) are due for refill today: Yes    Requested medication(s) are on the active medication list same strength, form, dose/ sig: Yes    Requested medication(s) are managed by provider: Yes    Patient has already received a courtsey refill: No    NOV: none   Asked to Return: prn

## 2025-01-19 DIAGNOSIS — E03.9 HYPOTHYROIDISM, UNSPECIFIED TYPE: Primary | ICD-10-CM

## 2025-01-20 RX ORDER — LEVOTHYROXINE SODIUM 25 UG/1
25 TABLET ORAL
Qty: 90 TABLET | Refills: 0 | Status: SHIPPED | OUTPATIENT
Start: 2025-01-20

## 2025-01-20 NOTE — TELEPHONE ENCOUNTER
Covering for Dr. Marie, refill of levothyroxine sent.     Chaitanya Harris MD, 01/20/25, 11:27 AM

## 2025-01-20 NOTE — TELEPHONE ENCOUNTER
Last office visit: 1/22/2024   Protocol: pass    Requested medication(s) are due for refill today: Yes    Requested medication(s) are on the active medication list same strength, form, dose/ sig: Yes    Requested medication(s) are managed by provider: Yes    Patient has already received a courtsey refill: No    NOV: 03/05/2025  Asked to Return: n/a

## 2025-02-26 ENCOUNTER — TELEPHONE (OUTPATIENT)
Dept: FAMILY MEDICINE CLINIC | Facility: CLINIC | Age: 65
End: 2025-02-26

## 2025-02-26 DIAGNOSIS — E03.9 ACQUIRED HYPOTHYROIDISM: ICD-10-CM

## 2025-02-26 DIAGNOSIS — Z13.89 SCREENING FOR GENITOURINARY CONDITION: ICD-10-CM

## 2025-02-26 DIAGNOSIS — E53.8 VITAMIN B12 DEFICIENCY: ICD-10-CM

## 2025-02-26 DIAGNOSIS — E55.9 VITAMIN D DEFICIENCY: ICD-10-CM

## 2025-02-26 DIAGNOSIS — I10 ESSENTIAL HYPERTENSION: Primary | ICD-10-CM

## 2025-02-26 DIAGNOSIS — Z86.39 HISTORY OF DIABETES MELLITUS: ICD-10-CM

## 2025-02-26 DIAGNOSIS — E78.5 DYSLIPIDEMIA: ICD-10-CM

## 2025-03-04 RX ORDER — ONDANSETRON 8 MG/1
8 TABLET, FILM COATED ORAL EVERY 8 HOURS PRN
COMMUNITY
Start: 2024-12-05

## 2025-03-04 RX ORDER — OLAPARIB 100 MG/1
TABLET, FILM COATED ORAL
COMMUNITY
Start: 2025-02-06

## 2025-03-05 ENCOUNTER — OFFICE VISIT (OUTPATIENT)
Dept: FAMILY MEDICINE CLINIC | Facility: CLINIC | Age: 65
End: 2025-03-05
Payer: MEDICARE

## 2025-03-05 VITALS
HEIGHT: 62 IN | DIASTOLIC BLOOD PRESSURE: 68 MMHG | WEIGHT: 132.5 LBS | OXYGEN SATURATION: 99 % | BODY MASS INDEX: 24.38 KG/M2 | RESPIRATION RATE: 18 BRPM | HEART RATE: 64 BPM | SYSTOLIC BLOOD PRESSURE: 120 MMHG

## 2025-03-05 DIAGNOSIS — Z86.39 HISTORY OF DIABETES MELLITUS: ICD-10-CM

## 2025-03-05 DIAGNOSIS — C56.1: ICD-10-CM

## 2025-03-05 DIAGNOSIS — E78.01 ESSENTIAL FAMILIAL HYPERCHOLESTEROLEMIA: ICD-10-CM

## 2025-03-05 DIAGNOSIS — Z12.11 SCREENING FOR MALIGNANT NEOPLASM OF COLON: ICD-10-CM

## 2025-03-05 DIAGNOSIS — C78.6 PERITONEAL CARCINOMATOSIS (HCC): ICD-10-CM

## 2025-03-05 DIAGNOSIS — Z90.13 S/P BILATERAL MASTECTOMY: ICD-10-CM

## 2025-03-05 DIAGNOSIS — Z71.85 VACCINE COUNSELING: ICD-10-CM

## 2025-03-05 DIAGNOSIS — Z15.09 BRCA2 POSITIVE: ICD-10-CM

## 2025-03-05 DIAGNOSIS — F40.10 SOCIAL ANXIETY DISORDER: ICD-10-CM

## 2025-03-05 DIAGNOSIS — Z79.899 MEDICATION MANAGEMENT: ICD-10-CM

## 2025-03-05 DIAGNOSIS — E88.810 METABOLIC SYNDROME: ICD-10-CM

## 2025-03-05 DIAGNOSIS — E55.9 VITAMIN D DEFICIENCY: ICD-10-CM

## 2025-03-05 DIAGNOSIS — Z85.3 HISTORY OF BREAST CANCER IN FEMALE: ICD-10-CM

## 2025-03-05 DIAGNOSIS — R05.3 HABITUAL COUGH: ICD-10-CM

## 2025-03-05 DIAGNOSIS — E78.5 DYSLIPIDEMIA: ICD-10-CM

## 2025-03-05 DIAGNOSIS — Z95.1 S/P CABG X 2: ICD-10-CM

## 2025-03-05 DIAGNOSIS — Z00.00 ENCOUNTER FOR ANNUAL HEALTH EXAMINATION: Primary | ICD-10-CM

## 2025-03-05 DIAGNOSIS — I10 ESSENTIAL HYPERTENSION: ICD-10-CM

## 2025-03-05 DIAGNOSIS — K57.90 DIVERTICULOSIS: ICD-10-CM

## 2025-03-05 DIAGNOSIS — Z78.0 MENOPAUSE: ICD-10-CM

## 2025-03-05 DIAGNOSIS — Z15.01 BRCA2 POSITIVE: ICD-10-CM

## 2025-03-05 DIAGNOSIS — K51.00 ULCERATIVE PANCOLITIS WITHOUT COMPLICATION (HCC): ICD-10-CM

## 2025-03-05 DIAGNOSIS — E53.8 VITAMIN B12 DEFICIENCY: ICD-10-CM

## 2025-03-05 DIAGNOSIS — E03.9 ACQUIRED HYPOTHYROIDISM: ICD-10-CM

## 2025-03-05 DIAGNOSIS — I25.10 CORONARY ARTERY DISEASE INVOLVING NATIVE CORONARY ARTERY OF NATIVE HEART WITHOUT ANGINA PECTORIS: ICD-10-CM

## 2025-03-05 PROBLEM — T45.1X5A ANEMIA ASSOCIATED WITH CHEMOTHERAPY: Status: ACTIVE | Noted: 2024-04-11

## 2025-03-05 PROBLEM — R18.8 ABDOMINAL ASCITES: Status: ACTIVE | Noted: 2025-03-05

## 2025-03-05 PROBLEM — D64.81 ANEMIA ASSOCIATED WITH CHEMOTHERAPY: Status: ACTIVE | Noted: 2024-04-11

## 2025-03-05 LAB — HEMOGLOBIN A1C: 4.8 % (ref 4.3–5.6)

## 2025-03-05 RX ORDER — ROSUVASTATIN CALCIUM 20 MG/1
20 TABLET, COATED ORAL NIGHTLY
COMMUNITY
Start: 2025-01-22

## 2025-03-05 RX ORDER — CARBOPLATIN 10 MG/ML
INJECTION INTRAVENOUS AS DIRECTED
COMMUNITY

## 2025-03-05 NOTE — H&P
Subjective:   Zully Finn is a 65 year old female who presents for a Medicare Initial Preventative Physical Exam (Welcome to Medicare- < 12 months on Medicare) and scheduled follow up of multiple significant but stable problems.   History of Present Illness  Hx of DM -- stable  A1c 4.8  HTN -- stable at home -- 110's/60/70's  Dyslipidemia -- seeing Dr. Lopez   Hypothyroid -- stable  Overweight -- staying active  Ovarian cancer -- per onc    Eye exam -- 5/2024  Dental exam -- 1/2025  Colonoscopy due.  Deferred gyne and EKG.  Dr. Lopez - -cards -- last visit about 1 month ago  A1c 5.6 in 2/2025      History/Other:   Fall Risk Assessment:   She has been screened for Falls and is low risk.      Cognitive Assessment:   She had a completely normal cognitive assessment - see flowsheet entries       Functional Ability/Status:   Zully Finn has a completely normal functional assessment. See flowsheet for details.      Depression Screening (PHQ):  PHQ-2 SCORE: 0  , done 3/5/2025   If you checked off any problems, how difficult have these problems made it for you to do your work, take care of things at home, or get along with other people?: Not difficult at all    Last [place] Suicide Screening on 3/5/2025 was No Risk.     5 minutes spent screening and counseling for depression    Advanced Directives:   She does have a Living Will but we do NOT have it on file in Epic.    She has a Power of  for Health Care on file in [place].  Patient has Advance Care Planning documents present in EMR. Reviewed documents with patient (and family/surrogate if present).      Patient Active Problem List   Diagnosis    Social anxiety disorder    Hypertension    Hypercholesteremia    Fibrocystic breast disease    Diverticulosis    Diverticulitis    Ulcerative colitis (HCC)    History of adenomatous polyp of colon    Borderline diabetes    S/P CABG x 2    Ovarian cancer (HCC)    Asymptomatic carotid artery stenosis, bilateral    CAD  (coronary artery disease), native coronary artery    Essential familial hypercholesterolemia    Metabolic syndrome    Anemia    BRCA2 positive    Bilateral ocular hypertension    Abnormal computed tomography scan    History of breast cancer in female    Peritoneal carcinomatosis (HCC)    Pelvic mass    Fatigue    Anemia associated with chemotherapy    Abdominal ascites     Allergies:  She is allergic to fentanyl, clindamycin, and seasonal.    Current Medications:  Outpatient Medications Marked as Taking for the 3/5/25 encounter (Office Visit) with Cecille Hinojosa DO   Medication Sig    rosuvastatin 20 MG Oral Tab Take 1 tablet (20 mg total) by mouth nightly.    LYNPARZA 100 MG Oral Tab     ondansetron (ZOFRAN) 8 MG tablet Take 1 tablet (8 mg total) by mouth every 8 (eight) hours as needed for Nausea.    LEVOTHYROXINE 25 MCG Oral Tab TAKE ONE TABLET BY MOUTH EVERY DAY BEFORE BREAKFAST    METOPROLOL SUCCINATE ER 50 MG Oral Tablet 24 Hr TAKE ONE TABLET BY MOUTH EVERY DAY    iopamidol 61 % Injection Solution iopamidoL 300 mg iodine/mL (61 %) intrathecal solution, [RxNorm: 0]    gabapentin 100 MG Oral Cap Take one 100 mg capsule nightly x 4 days, then two 100 mg capsule nightly x 4 days, then three 100 mg capsules nightly.    prochlorperazine (COMPAZINE) 10 mg tablet     NON FORMULARY FM Formula + TR 0.0125%    loratadine 10 MG Oral Tab Take 1 tablet (10 mg total) by mouth daily.    aspirin 81 MG Oral Tab Take 1 tablet (81 mg total) by mouth daily.    REPATHA SURECLICK 140 MG/ML Subcutaneous Solution Auto-injector Inject 140 mg into the skin every 14 (fourteen) days.         Medical History:  She  has a past medical history of Abdominal distention, Abdominal pain, Acute colitis (11/19/2013), Anemia (6/2020), Anxiety, Atherosclerosis of coronary artery (02/15/2018), Back problem, Bloating, Blood in the stool, Borderline diabetes, Cancer (HCC) (06/2018), Change in stool, Colitis, Colonic obstruction (HCC), Constipation,  Coronary atherosclerosis, Depression, Diarrhea (11/19/2013), Diarrhea, unspecified, Diverticulitis, Diverticulosis (11/19/2013), Fibrocystic breast disease, Fibroid uterus, Food intolerance, Gastrointestinal disorder, Hemorrhoids, High blood pressure, High cholesterol, High coronary artery calcium score, History of blood transfusion, Hyperlipidemia LDL goal <70, Hypertension (5/3/2013), Hypertension, essential, benign, Intestinal bleeding (11/19/2013), Irregular bowel habits (6/2020), Metabolic syndrome, Personal history of antineoplastic chemotherapy, Prediabetes, Proctosigmoiditis, S/P CABG x 2, Seasonal allergies, Social anxiety disorder (5/3/2013), and Visual impairment.  Surgical History:  She  has a past surgical history that includes colonoscopy (age 50); colonoscopy with biopsy (11/19/2013); oral surgery procedure (1980); sree biopsy stereo nodule 1 site right (cpt=19081) (03/2016); colonoscopy (N/A, 03/30/2017); tonsillectomy; tonsillectomy (1969); cabg; angiogram (02/15/2018); bypass surgery (03/22/2018); total abdom hysterectomy; hysterectomy; and mastectomy left (Bilateral, 10/2020).   Family History:  Her family history includes Breast Cancer in her paternal aunt; Diabetes in her brother, father, and mother; Heart Attack in her father; Heart Disorder in her mother; High Blood Pressure in her father and mother; Melanoma in her brother; Stroke in her mother; Thyroid Disorder in her mother.  Social History:  She  reports that she has never smoked. She has never used smokeless tobacco. She reports current alcohol use of about 1.0 standard drink of alcohol per week. She reports that she does not use drugs.    Tobacco:  She has never smoked tobacco.    CAGE Alcohol Screen:   CAGE screening score of 0 on 3/5/2025, showing low risk of alcohol abuse.      Patient Care Team:  Cecille Hinojosa DO as PCP - General (Family Practice)  Alejandro Taylor MD as Consulting Physician (GASTROENTEROLOGY)  Tobi Lira MD as  Consulting Physician (SURGERY, CARDIOVASCULAR)  Shira Lopez MD as Consulting Physician (Cardiovascular Diseases)  Santosh Saini MD as Consulting Physician (CARDIOLOGY)  Yanci Cleaning MD (GASTROENTEROLOGY)  Reynold Roman APRN (Nurse Practitioner)    Review of Systems  GENERAL: feels well otherwise  SKIN: denies any unusual skin lesions  EYES: denies blurred vision or double vision  HEENT: denies nasal congestion, sinus pain or ST  LUNGS: denies shortness of breath with exertion  CARDIOVASCULAR: denies chest pain on exertion  GI: denies abdominal pain, denies heartburn  : denies dysuria, vaginal discharge or itching, no complaint of urinary incontinence   MUSCULOSKELETAL: denies back pain  NEURO: denies headaches  PSYCHE: denies depression or anxiety  HEMATOLOGIC: denies hx of anemia  ENDOCRINE: denies thyroid history  ALL/ASTHMA: denies hx of allergy or asthma    Objective:   Physical Exam  General Appearance:  Alert, cooperative, no distress, appears stated age   Head:  Normocephalic, without obvious abnormality, atraumatic   Eyes:  Conjunctiva/corneas clear, EOM's intact both eyes   Ears:  Normal TM's and external ear canals, both ears   Nose: Nares normal, septum midline,mucosa normal, no drainage or sinus tenderness   Throat: Lips, mucosa, and tongue normal; teeth and gums normal   Neck: Supple, symmetrical, trachea midline, no adenopathy;  thyroid: not enlarged, symmetric, no tenderness/mass/nodules; no carotid bruit or JVD   Back:   Symmetric, no curvature, ROM normal, no CVA tenderness   Lungs:   Clear to auscultation bilaterally, respirations unlabored   Heart:  Regular rate and rhythm, S1 and S2 normal, no murmur, rub, or gallop   Abdomen:   Soft, non-tender, bowel sounds active all four quadrants,  no masses, no organomegaly   Breast/Pelvic: Deferred   Extremities: Extremities normal, atraumatic, no cyanosis or edema   Pulses: 2+ and symmetric   Skin: Skin color, texture, turgor  normal, no rashes or lesions   Lymph nodes: Cervical, supraclavicular, and axillary nodes normal   Neurologic: Normal        /68 (BP Location: Left arm, Patient Position: Sitting, Cuff Size: adult)   Pulse 64   Resp 18   Ht 5' 2\" (1.575 m)   Wt 132 lb 8 oz (60.1 kg)   LMP 04/08/2010   SpO2 99%   BMI 24.23 kg/m²  Estimated body mass index is 24.23 kg/m² as calculated from the following:    Height as of this encounter: 5' 2\" (1.575 m).    Weight as of this encounter: 132 lb 8 oz (60.1 kg).    Medicare Hearing Assessment:   Hearing Screening    Time taken: 3/5/2025 11:41 AM  Entry User: Genevieve Grnade MA  Screening Method: Finger Rub  Finger Rub Result: Pass         Visual Acuity:   Right Eye Visual Acuity: Corrected Right Eye Chart Acuity: 20/20   Left Eye Visual Acuity: Corrected Left Eye Chart Acuity: 20/20   Both Eyes Visual Acuity: Corrected Both Eyes Chart Acuity: 20/20   Able To Tolerate Visual Acuity: Yes        Assessment & Plan:   Zully Finn is a 65 year old female who presents for a Medicare Assessment.     1. Encounter for annual health examination (Primary)  2. Essential hypertension  -     Detailed, Mod Complex (45248)  3. Dyslipidemia  -     Detailed, Mod Complex (09296)  4. Social anxiety disorder  -     Detailed, Mod Complex (28746)  5. Coronary artery disease involving native coronary artery of native heart without angina pectoris  -     Detailed, Mod Complex (31955)  6. History of diabetes mellitus  -     POC Hemoglobin A1C  -     Detailed, Mod Complex (77620)  7. Acquired hypothyroidism  -     Detailed, Mod Complex (96655)  8. Vitamin D deficiency  -     Detailed, Mod Complex (58683)  9. Vitamin B12 deficiency  -     Detailed, Mod Complex (40649)  10. Habitual cough  -     Detailed, Mod Complex (45023)  11. BRCA2 positive  -     Detailed, Mod Complex (90305)  12. History of breast cancer in female  -     Detailed, Mod Complex (20261)  13. S/P bilateral mastectomy  -     Detailed,  Mod Complex (86430)  14. 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 S/P CABG x 2  Overview:  On 3/22/2018: CABG x 2 (LIMA to LAD and SVG to RCA)    Orders:  -     Detailed, Mod Complex (16400)  16. Essential familial hypercholesterolemia  Overview:  positive FH test 2/16/18  Orders:  -     Detailed, Mod Complex (51485)  17. Peritoneal carcinomatosis (HCC)  -     Detailed, Mod Complex (19755)  18. Diverticulosis  -     Detailed, Mod Complex (06647)  19. Ulcerative pancolitis without complication (HCC)  -     Detailed, Mod Complex (45176)  20. Metabolic syndrome  -     Detailed, Mod Complex (82452)  21. Menopause  -     XR DEXA BONE DENSITOMETRY (CPT=77080); Future; Expected date: 03/05/2025  -     Detailed, Mod Complex (37765)  22. Screening for malignant neoplasm of colon  -     Gastro Referral - In Network  -     Detailed, Mod Complex (17382)  23. Medication management  24. Vaccine counseling  Assessment & Plan    The patient indicates understanding of these issues and agrees to the plan.  Reinforced healthy diet, lifestyle, and exercise.      1. Encounter for annual health examination  Done today.    2. Essential hypertension  Stable on metoprolol  - Detailed, Mod Complex (61162)    3. Dyslipidemia  Stable on repatha and rosuvastatin  - Detailed, Mod Complex (84905)    4. Social anxiety disorder  Stable; Cpm  - Detailed, Mod Complex (42509)    5. Coronary artery disease involving native coronary artery of native heart without angina pectoris  Stable on metoprolol; sees Dr. Lopez  - Detailed, Mod Complex (36630)    6. History of diabetes mellitus  POC 4.8 today  - POC Hemoglobin A1C  - Detailed, Mod Complex (15850)    7. Acquired hypothyroidism  Stable on levothyorxine 25 mcg daily  - Detailed, Mod Complex (64926)    8. Vitamin D deficiency  Stable; CPM  - Detailed, Mod Complex (57051)    9. Vitamin B12 deficiency  Stable; CPM  - Detailed, Mod Complex  (92814)    10. Habitual cough  Stable; CPM  - Detailed, Mod Complex (22961)    11. BRCA2 positive  Stable; CPM  - Detailed, Mod Complex (26225)    12. History of breast cancer in female  Stable; CPM  - Detailed, Mod Complex (34847)    13. S/P bilateral mastectomy  Stable; CPM  - Detailed, Mod Complex (58550)    14. 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 Vera; doing chemo  - Detailed, Mod Complex (88745)    15. S/P CABG x 2  Stable per Dr. Lopez  - Detailed, Mod Complex (07719)    16. Essential familial hypercholesterolemia  Stable on repatha per Geetha Lopez  - Detailed, Mod Complex (80330)    17. Peritoneal carcinomatosis (HCC)  Doing chemo  - Detailed, Mod Complex (01138)    18. Diverticulosis  Stable; CPM  - Detailed, Mod Complex (03409)    19. Ulcerative pancolitis without complication (HCC)  Stable; CPM  - Detailed, Mod Complex (29424)    20. Metabolic syndrome  Stable; CPM  - Detailed, Mod Complex (92509)    21. Menopause  Stable; advised dexa  - XR DEXA BONE DENSITOMETRY (CPT=77080); Future  - Detailed, Mod Complex (15780)    22. Screening for malignant neoplasm of colon  Sees GI frequently  - Gastro Referral - In Network  - Detailed, Mod Complex (16933)    23. Medication management  Reviewed.    24. Vaccine counseling  Reviewed.    Other orders  - LYNPARZA 100 MG Oral Tab  - ondansetron (ZOFRAN) 8 MG tablet; Take 1 tablet (8 mg total) by mouth every 8 (eight) hours as needed for Nausea.  - rosuvastatin 20 MG Oral Tab; Take 1 tablet (20 mg total) by mouth nightly.  - CARBOplatin 50 mg/5mL Intravenous Solution; Inject into the vein As Directed. (Patient not taking: Reported on 3/5/2025)        Return in about 6 months (around 9/5/2025) for med check 30.     Cecille Hinojosa DO, 3/5/2025     Supplementary Documentation:   General Health:  In the past six months, have you lost more than 10 pounds without trying?: 2 - No  Has your appetite been poor?: Yes  Type of Diet: Balanced  How does the  patient maintain a good energy level?: Daily Walks  How would you describe your daily physical activity?: Light  How would you describe your current health state?: Good  How do you maintain positive mental well-being?: Social Interaction, Visiting Family, Visiting Friends  On a scale of 0 to 10, with 0 being no pain and 10 being severe pain, what is your pain level?: 2 - (Mild)  In the past six months, have you experienced urine leakage?: 0-No  At any time do you feel concerned for the safety/well-being of yourself and/or your children, in your home or elsewhere?: No  Have you had any immunizations at another office such as Influenza, Hepatitis B, Tetanus, or Pneumococcal?: No    Health Maintenance   Topic Date Due    Colorectal Cancer Screening  07/06/2023    DEXA Scan  01/02/2024    Annual Depression Screening  01/01/2025    Fall Risk Screening (Annual)  01/01/2025    Annual Physical  01/22/2025    COVID-19 Vaccine (7 - 2024-25 season) 05/16/2025    Influenza Vaccine  Completed    Pneumococcal Vaccine: 50+ Years  Completed    Zoster Vaccines  Completed    Meningococcal B Vaccine  Aged Out    Mammogram  Discontinued

## 2025-03-08 DIAGNOSIS — I10 ESSENTIAL HYPERTENSION: ICD-10-CM

## 2025-03-10 RX ORDER — METOPROLOL SUCCINATE 50 MG/1
50 TABLET, EXTENDED RELEASE ORAL DAILY
Qty: 90 TABLET | Refills: 1 | Status: SHIPPED | OUTPATIENT
Start: 2025-03-10

## 2025-03-10 NOTE — TELEPHONE ENCOUNTER
Last office visit: 3/5/25   Protocol: pass  Requested medication(s) are due for refill today: yes  Requested medication(s) are on the active medication list same strength, form, dose/ sig: yes  Requested medication(s) are managed by provider: yes  Patient has already received a courtsey refill: no    NOV: none    Asked to Return: 9/5/25

## 2025-03-17 ENCOUNTER — HOSPITAL ENCOUNTER (OUTPATIENT)
Dept: BONE DENSITY | Age: 65
Discharge: HOME | End: 2025-03-17
Attending: FAMILY MEDICINE
Payer: MEDICARE

## 2025-03-17 DIAGNOSIS — Z78.0 MENOPAUSE: ICD-10-CM

## 2025-03-17 PROCEDURE — 77080 DXA BONE DENSITY AXIAL: CPT | Performed by: FAMILY MEDICINE

## 2025-04-01 DIAGNOSIS — I10 ESSENTIAL HYPERTENSION: ICD-10-CM

## 2025-04-01 DIAGNOSIS — E03.9 HYPOTHYROIDISM, UNSPECIFIED TYPE: ICD-10-CM

## 2025-04-01 RX ORDER — LEVOTHYROXINE SODIUM 25 UG/1
25 TABLET ORAL
Qty: 90 TABLET | Refills: 0 | Status: SHIPPED | OUTPATIENT
Start: 2025-04-01

## 2025-04-01 RX ORDER — METOPROLOL SUCCINATE 50 MG/1
50 TABLET, EXTENDED RELEASE ORAL DAILY
Qty: 90 TABLET | Refills: 1 | Status: SHIPPED | OUTPATIENT
Start: 2025-04-01

## 2025-04-01 RX ORDER — METOPROLOL SUCCINATE 50 MG/1
50 TABLET, EXTENDED RELEASE ORAL DAILY
Qty: 90 TABLET | Refills: 1 | Status: SHIPPED | OUTPATIENT
Start: 2025-04-01 | End: 2025-04-01

## 2025-04-01 NOTE — TELEPHONE ENCOUNTER
Last office visit: 3/5/25    Protocol: fail  Requested medication(s) are due for refill today: yes  Requested medication(s) are on the active medication list: yes  Patient has already received a courtsey refill: no  Reason request has been forwarded to provider:   Thyroid Medication Protocol Sweocu4404/01/2025 11:59 AM   Protocol Details TSH in past 12 months     Last TSH per [facility] was 9.894 on 12/30/24    NOV: none    Metoprolol succinate passed refill protocol   It was recently sent to different home delivery service but pt requesting it be sent to [facility] instead

## 2025-04-01 NOTE — TELEPHONE ENCOUNTER
Covering for Dr. Marie, refill of levothyroxine sent.     Chaitanya Harris MD, 04/01/25, 1:58 PM

## 2025-04-01 NOTE — TELEPHONE ENCOUNTER
Patient calling for a refill on her METOPROLOL SUCCINATE ER 50 MG Oral Tablet 24 Hr and LEVOTHYROXINE 25 MCG Oral Tab to go to [facility] 90 tab supply

## 2025-04-02 DIAGNOSIS — E03.9 HYPOTHYROIDISM, UNSPECIFIED TYPE: ICD-10-CM

## 2025-04-02 RX ORDER — LEVOTHYROXINE SODIUM 25 UG/1
25 TABLET ORAL
Qty: 90 TABLET | Refills: 0 | OUTPATIENT
Start: 2025-04-02

## 2025-06-14 DIAGNOSIS — E03.9 HYPOTHYROIDISM, UNSPECIFIED TYPE: ICD-10-CM

## 2025-06-16 RX ORDER — LEVOTHYROXINE SODIUM 25 UG/1
25 TABLET ORAL
Qty: 90 TABLET | Refills: 1 | Status: SHIPPED | OUTPATIENT
Start: 2025-06-16

## 2025-06-16 NOTE — TELEPHONE ENCOUNTER
Last office visit: 3/5/2025   Protocol: pass  Requested medication(s) are due for refill today: yes  Requested medication(s) are on the active medication list same strength, form, dose/ sig: yes  Requested medication(s) are managed by provider: yes  Patient has already received a courtsey refill: no    NOV: none   Last Labs: 3/5/2025  Asked to Return: 9/5/2025

## 2025-07-11 ENCOUNTER — LAB ENCOUNTER (OUTPATIENT)
Dept: LAB | Age: 65
End: 2025-07-11
Attending: INTERNAL MEDICINE
Payer: MEDICARE

## 2025-07-11 DIAGNOSIS — I10 ESSENTIAL HYPERTENSION, MALIGNANT: ICD-10-CM

## 2025-07-11 DIAGNOSIS — R53.83 FATIGUE: ICD-10-CM

## 2025-07-11 DIAGNOSIS — I25.10 CORONARY ATHEROSCLEROSIS OF NATIVE CORONARY ARTERY: Primary | ICD-10-CM

## 2025-07-11 DIAGNOSIS — E55.9 VITAMIN D DEFICIENCY: ICD-10-CM

## 2025-07-11 DIAGNOSIS — E78.01 FAMILIAL HYPERCHOLESTEROLEMIA: ICD-10-CM

## 2025-07-11 DIAGNOSIS — Z13.21 SCREENING FOR MALNUTRITION: ICD-10-CM

## 2025-07-11 LAB
ALBUMIN SERPL-MCNC: 4 G/DL (ref 3.2–4.8)
ALBUMIN/GLOB SERPL: 1.5 {RATIO} (ref 1–2)
ALP LIVER SERPL-CCNC: 76 U/L (ref 55–142)
ALT SERPL-CCNC: 14 U/L (ref 10–49)
ANION GAP SERPL CALC-SCNC: 3 MMOL/L (ref 0–18)
AST SERPL-CCNC: 32 U/L (ref ?–34)
BASOPHILS # BLD AUTO: 0.04 X10(3) UL (ref 0–0.2)
BASOPHILS NFR BLD AUTO: 1.1 %
BILIRUB SERPL-MCNC: 0.4 MG/DL (ref 0.2–1.1)
BUN BLD-MCNC: 18 MG/DL (ref 9–23)
CALCIUM BLD-MCNC: 9.4 MG/DL (ref 8.7–10.6)
CHLORIDE SERPL-SCNC: 108 MMOL/L (ref 98–112)
CHOLEST SERPL-MCNC: 109 MG/DL (ref ?–200)
CO2 SERPL-SCNC: 28 MMOL/L (ref 21–32)
CREAT BLD-MCNC: 1.37 MG/DL (ref 0.55–1.02)
EGFRCR SERPLBLD CKD-EPI 2021: 43 ML/MIN/1.73M2 (ref 60–?)
EOSINOPHIL # BLD AUTO: 0.12 X10(3) UL (ref 0–0.7)
EOSINOPHIL NFR BLD AUTO: 3.2 %
ERYTHROCYTE [DISTWIDTH] IN BLOOD BY AUTOMATED COUNT: 18.6 %
FASTING PATIENT LIPID ANSWER: YES
FASTING STATUS PATIENT QL REPORTED: YES
GLOBULIN PLAS-MCNC: 2.6 G/DL (ref 2–3.5)
GLUCOSE BLD-MCNC: 100 MG/DL (ref 70–99)
HCT VFR BLD AUTO: 31.1 % (ref 35–48)
HDLC SERPL-MCNC: 37 MG/DL (ref 40–59)
HGB BLD-MCNC: 10.5 G/DL (ref 12–16)
IMM GRANULOCYTES # BLD AUTO: 0.01 X10(3) UL (ref 0–1)
IMM GRANULOCYTES NFR BLD: 0.3 %
LDLC SERPL CALC-MCNC: 51 MG/DL (ref ?–100)
LYMPHOCYTES # BLD AUTO: 0.72 X10(3) UL (ref 1–4)
LYMPHOCYTES NFR BLD AUTO: 19 %
MCH RBC QN AUTO: 39.5 PG (ref 26–34)
MCHC RBC AUTO-ENTMCNC: 33.8 G/DL (ref 31–37)
MCV RBC AUTO: 116.9 FL (ref 80–100)
MONOCYTES # BLD AUTO: 0.52 X10(3) UL (ref 0.1–1)
MONOCYTES NFR BLD AUTO: 13.7 %
NEUTROPHILS # BLD AUTO: 2.38 X10 (3) UL (ref 1.5–7.7)
NEUTROPHILS # BLD AUTO: 2.38 X10(3) UL (ref 1.5–7.7)
NEUTROPHILS NFR BLD AUTO: 62.7 %
NONHDLC SERPL-MCNC: 72 MG/DL (ref ?–130)
OSMOLALITY SERPL CALC.SUM OF ELEC: 290 MOSM/KG (ref 275–295)
PLATELET # BLD AUTO: 86 10(3)UL (ref 150–450)
PLATELETS.RETICULATED NFR BLD AUTO: 2.6 % (ref 0–7)
POTASSIUM SERPL-SCNC: 4.1 MMOL/L (ref 3.5–5.1)
PROT SERPL-MCNC: 6.6 G/DL (ref 5.7–8.2)
RBC # BLD AUTO: 2.66 X10(6)UL (ref 3.8–5.3)
SODIUM SERPL-SCNC: 139 MMOL/L (ref 136–145)
TRIGL SERPL-MCNC: 117 MG/DL (ref 30–149)
TSI SER-ACNC: 9.39 UIU/ML (ref 0.55–4.78)
VIT D+METAB SERPL-MCNC: 45.6 NG/ML (ref 30–100)
VLDLC SERPL CALC-MCNC: 17 MG/DL (ref 0–30)
WBC # BLD AUTO: 3.8 X10(3) UL (ref 4–11)

## 2025-07-11 PROCEDURE — 80061 LIPID PANEL: CPT

## 2025-07-11 PROCEDURE — 85025 COMPLETE CBC W/AUTO DIFF WBC: CPT

## 2025-07-11 PROCEDURE — 82306 VITAMIN D 25 HYDROXY: CPT

## 2025-07-11 PROCEDURE — 80053 COMPREHEN METABOLIC PANEL: CPT

## 2025-07-11 PROCEDURE — 84443 ASSAY THYROID STIM HORMONE: CPT

## 2025-07-11 PROCEDURE — 36415 COLL VENOUS BLD VENIPUNCTURE: CPT

## 2025-07-25 ENCOUNTER — TELEPHONE (OUTPATIENT)
Dept: FAMILY MEDICINE CLINIC | Facility: CLINIC | Age: 65
End: 2025-07-25

## 2025-07-25 DIAGNOSIS — E03.9 HYPOTHYROIDISM, UNSPECIFIED TYPE: Primary | ICD-10-CM

## 2025-07-25 NOTE — TELEPHONE ENCOUNTER
I was reading Dr. Lopez's consult note which mentioned the patient has an abnormal thyroid and vitamin D level.  Patient's vitamin D level drawn on 7/11/2025 is within normal limits at 45.6.    Her TSH is elevated at 9.395.  Please confirm if she is taking levothyroxine 25 mcg daily.  If so, increase to 50 mcg daily #90 and repeat her TSH and free T4 in 6 weeks.

## 2025-07-25 NOTE — TELEPHONE ENCOUNTER
Spoke with pt, she confirmed she is taking 25 mcg levothyroxine but she thinks she was taking it too close to hear breakfast (within 30 minutes)   She has since adjusted and now separates them a little bit more by taking the levothyroxine when she wakes up in the middle of the night   She is wondering if she should wait to see if this change helps? Or still increase dose now?   She is not interested in increasing the dose until it is absolutely necessary

## (undated) DEVICE — Device

## (undated) DEVICE — DRAPE SLUSH/WARMER W/DISC

## (undated) DEVICE — SCRUB PVP -1 PREP SOLUTION 4OZ

## (undated) DEVICE — CLEAR MONOFILAMENT (POLYDIOXANONE), ABSORBABLE SURGICAL SUTURE: Brand: PDS

## (undated) DEVICE — SUTURE ETHILON 3-0 PS-1

## (undated) DEVICE — DRAPE PACK CHEST & U BAR

## (undated) DEVICE — SUTURE PROLENE 7-0 CC

## (undated) DEVICE — SUTURE PROLENE 5-0 C-1

## (undated) DEVICE — PLASTIC BREAST CDS-LF: Brand: MEDLINE INDUSTRIES, INC.

## (undated) DEVICE — SUTURE POLYDEK 2-0

## (undated) DEVICE — FIXED CORE WIRE GUIDE SAFE-T-J, CURVED: Brand: COOK

## (undated) DEVICE — SUTURE SILK 2-0

## (undated) DEVICE — HEMOCLIP HORIZON MED 002200

## (undated) DEVICE — CAUTERY BLADE 2IN INS E1455

## (undated) DEVICE — PROXIMATE RH ROTATING HEAD SKIN STAPLERS (35 WIDE) CONTAINS 35 STAINLESS STEEL STAPLES: Brand: PROXIMATE

## (undated) DEVICE — CELL SAVER 5/5+ BOWL KIT-225ML: Brand: HAEMONETICS CELL SAVER 5/5+ SYSTEMS

## (undated) DEVICE — SUTURE PDS II 2-0 CT-2

## (undated) DEVICE — BLOWER CO2 MEDTRONIC/DLP 22150

## (undated) DEVICE — SUTURE PROLENE 3-0 SH

## (undated) DEVICE — Device: Brand: DEFENDO AIR/WATER/SUCTION AND BIOPSY VALVE

## (undated) DEVICE — STERILE POLYISOPRENE POWDER-FREE SURGICAL GLOVES: Brand: PROTEXIS

## (undated) DEVICE — GOWN,SIRUS,FABRIC-REINFORCED,LARGE: Brand: MEDLINE

## (undated) DEVICE — 40580 - THE PINK PAD - ADVANCED TRENDELENBURG POSITIONING KIT: Brand: 40580 - THE PINK PAD - ADVANCED TRENDELENBURG POSITIONING KIT

## (undated) DEVICE — SUTURE MONOCRYL 4-0 PS-2

## (undated) DEVICE — SUTURE VICRYL 2-0 SH

## (undated) DEVICE — ADHESIVE MASTISOL 2/3CC VL

## (undated) DEVICE — BLADE STERNAL SAW BULK PACK

## (undated) DEVICE — BREAST-HERNIA-PORT CDS-LF: Brand: MEDLINE INDUSTRIES, INC.

## (undated) DEVICE — MARKER SKIN PREP RESIST STRL

## (undated) DEVICE — OPEN HEART: Brand: MEDLINE INDUSTRIES, INC.

## (undated) DEVICE — HEMOCLIP HORIZON SM 001200

## (undated) DEVICE — LIGHT HANDLE

## (undated) DEVICE — LAPAROTOMY SPONGE - RF AND X-RAY DETECTABLE PRE-WASHED: Brand: SITUATE

## (undated) DEVICE — UNDERPAD 23X36 LIGHT ASBORB

## (undated) DEVICE — 3M™ STERI-DRAPE™ INSTRUMENT POUCH 1018: Brand: STERI-DRAPE™

## (undated) DEVICE — DRAIN SILICONE FLAT 10MM

## (undated) DEVICE — #10 STERILE BLADE: Brand: POLYMER COATED BLADES

## (undated) DEVICE — STOPCOCK IV 4 WAY BD

## (undated) DEVICE — CELL SAVER BAG 600ML 4R2023

## (undated) DEVICE — SOL  .9 1000ML BAG

## (undated) DEVICE — 1010 S-DRAPE TOWEL DRAPE 10/BX: Brand: STERI-DRAPE™

## (undated) DEVICE — 3M™ IOBAN™ 2 ANTIMICROBIAL INCISE DRAPE 6648EZ: Brand: IOBAN™ 2

## (undated) DEVICE — SUTURE MONOCRYL 3-0 PS-2

## (undated) DEVICE — GLOVE SURG TRIUMPH SZ 8

## (undated) DEVICE — BLADE ELECTRODE: Brand: EDGE

## (undated) DEVICE — TRAY ENDOVEIN KTV16 HARVESTING

## (undated) DEVICE — SOLUTION SET, MALE LUER LOCK ADAPTER

## (undated) DEVICE — SUTURE PROLENE 6-0 C-1

## (undated) DEVICE — ENDOSCOPY PACK - LOWER: Brand: MEDLINE INDUSTRIES, INC.

## (undated) DEVICE — SOL  .9 1000ML BTL

## (undated) DEVICE — SUTURE SILK 0 FSL

## (undated) DEVICE — TIBURON DRAPE TOWELS: Brand: CONVERTORS

## (undated) DEVICE — DRAIN RELIAVAC 100CC

## (undated) DEVICE — SOL LACT RINGERS 1000ML

## (undated) DEVICE — BRA SURGICAL ELIZABETH PINK L

## (undated) DEVICE — PDS II VLT 0 107CM AG ST3: Brand: ENDOLOOP

## (undated) DEVICE — DRAPE,TAPE STRIPS,STERILE: Brand: MEDLINE

## (undated) DEVICE — 3M™ STERI-STRIP™ REINFORCED ADHESIVE SKIN CLOSURES, R1547, 1/2 IN X 4 IN (12 MM X 100 MM), 6 STRIPS/ENVELOPE: Brand: 3M™ STERI-STRIP™

## (undated) DEVICE — #15 STERILE STAINLESS BLADE: Brand: STERILE STAINLESS BLADES

## (undated) DEVICE — ABDOMINAL PAD: Brand: DERMACEA

## (undated) DEVICE — SPONGE: LAP 18X18 W XR 200/CS: Brand: MEDICAL ACTION INDUSTRIES

## (undated) DEVICE — GAUZE SPONGES,12 PLY: Brand: CURITY

## (undated) DEVICE — SUTURE VICRYL 3-0 SH

## (undated) DEVICE — BLADE ELECTROSURG 4IN INSULATE

## (undated) DEVICE — LEAD TEMP PACING UNIPOLAR 6500

## (undated) DEVICE — SUPER SPONGES,MEDIUM: Brand: KERLIX

## (undated) DEVICE — CELL SAVER RESERVOIR BRAT

## (undated) DEVICE — KENDALL SCD EXPRESS SLEEVES, KNEE LENGTH, MEDIUM: Brand: KENDALL SCD

## (undated) DEVICE — DRESSING BIOPATCH 1X4 CNTR

## (undated) DEVICE — DERMABOND LIQUID ADHESIVE

## (undated) DEVICE — TRANSPOSAL ULTRAFLEX DUO/QUAD ULTRA CART MANIFOLD

## (undated) DEVICE — TOWEL OR BLU 16X26 STRL

## (undated) NOTE — LETTER
[facility] [address], [address]  Consent for Procedure/Sedation    Date:     Time:       1.  I authorize the performance upon [facility], [facility] the following:cardiac catheterization, left ventricular cineangiography, bilateral selec period, the physician will determine when the applicable recovery period ends for purposes of reinstating the Do Not Resuscitate (DNR) order.     Signature of Patient: ____________________________________________________    Signature of person authorized

## (undated) NOTE — LETTER
07/06/21        [facility], [facility]  [address]  [phone]    Dear Jez Black,    [address] [facility] records indicate that you have outstanding lab work and or testing that was ordered for you and has not yet been completed:        CBC W/DIFF      COMP METAB

## (undated) NOTE — LETTER
[address] [facility] FOR BLOOD OR BLOOD COMPONENTS      In the course of your treatment, it may become necessary to administer a transfusion of blood or blood components.  This form provides basic information concerning this proc explain the alternatives to you if it has not already been done. I,Zully Finn, have read/had read to me the above. I understand the matters bearing on the decision whether or not to authorize a transfusion of blood or blood components.  I have no quest

## (undated) NOTE — LETTER
09/09/21        [facility], [facility]  [address]  [phone]      Dear Karly Maurice,    [address] [facility] records indicate that you have outstanding lab work and or testing that was ordered for you and has not yet been completed:  Orders Placed This Encounter

## (undated) NOTE — LETTER
3/4/2020      [facility], [facility]  [address]  [address] [facility] [address]    Dear Ms. Becker,     [address] [facility] office has been trying to contact you to schedule a visit with your doctor to address important healthcare needs.   It is important that you contact our offic

## (undated) NOTE — IP AVS SNAPSHOT
[facility]    [address], [place], [place] ~ [phone]                Discharge Summary   3/30/2017    [facility], [facility]           Admission Information        Provider Department    3/30/2017 Gemini Sotomayor MD [facility] Endo tissue) for a day or two after the exam. This is normal.  - If you experience any rectal bleeding (not spotting), persistent tenderness or sharp severe abdominal pains, oral temperature over 100 degrees Fahrenheit, light-headedness or dizziness, or any oth MyChart     Visit [employer]  You can access your MyChart to more actively manage your health care and view more details from this visit by going to [email or web address].   If you've recently had a stay at the Hospital you can access your discharge

## (undated) NOTE — Clinical Note
Pt is coming for hospital follow up on 4/2/18. Pt stated she is doing okay since surgery. Her pain is managed well and she denies s/s of infection. Pt plans on decreasing and stopping Norco, pt refers not to take it.  Pt was advised to try OTC Tylenol and t

## (undated) NOTE — LETTER
[facility]  [address] 60 [address] [facility], [address] [place] [address]    Consent for Operation    Date: __________________    Time: _______________    1.  I authorize the performance upon [facility] the following operation:    Procedure(s):  coronary artery bypas videotape. [facility] will not be responsible for storage or maintenance of this tape. 6. For the purpose of advancing medical education, I consent to the admittance of observers to the Operating Room.     7. I authorize the use of any specimen, organs Signature of Patient:   ___________________________    When the patient is a minor or mentally incompetent to give consent:  Signature of person authorized to consent for patient: ___________________________   Relationship to patient: _____________________ drugs/illegal medications). Failure to inform my anesthesiologist about these medicines may increase my risk of anesthetic complications. · If I am allergic to anything or have had a reaction to anesthesia before.     3. I understand how the anesthesia med I have discussed the procedure and information above with the patient (or patient’s representative) and answered their questions. The patient or their representative has agreed to have anesthesia services.     _______________________________________________

## (undated) NOTE — LETTER
Kalina Malhotra [address]  [address] [facility] [address], [address] [facility]  Authorization for Surgical Operation and Procedure     Date:___________                                                                                                         Time:__________ 4.   Should the need arise during my operation or immediate post-operative period, I also consent to the administration of blood and/or blood products.   Further, I understand that despite careful testing and screening of blood or blood products by ronna 8.   I recognize that in the event my procedure results in extended X-Ray/fluoroscopy time, I may develop a skin reaction. 9.  If I have a Do Not Attempt Resuscitation (DNAR) order in place, that status will be suspended while in the operating room, proc 1. I, [address] agree to be cared for by an anesthesiologist, who is specially trained to monitor me and give me medicine to put me to sleep or keep me comfortable during my procedure    I understand that my anesthesiologist is not an employee or agen 5. My doctor has explained to me other choices available to me for my care (alternatives).   6. Pregnant Patients (“epidural”):  I understand that the risks of having an epidural (medicine given into my back to help control pain during labor), include itchi

## (undated) NOTE — LETTER
04/22/21        [facility]  [id number] [id number]      Dear Rita Mcdonald,    [address] [facility] records indicate that you have outstanding lab work and or testing that was ordered for you and has not yet been completed:  Orders Placed This Encounter